# Patient Record
Sex: MALE | Race: WHITE | NOT HISPANIC OR LATINO | Employment: OTHER | URBAN - METROPOLITAN AREA
[De-identification: names, ages, dates, MRNs, and addresses within clinical notes are randomized per-mention and may not be internally consistent; named-entity substitution may affect disease eponyms.]

---

## 2017-03-20 ENCOUNTER — LAB VISIT (OUTPATIENT)
Dept: LAB | Facility: HOSPITAL | Age: 82
End: 2017-03-20
Attending: FAMILY MEDICINE
Payer: MEDICARE

## 2017-03-20 ENCOUNTER — OFFICE VISIT (OUTPATIENT)
Dept: FAMILY MEDICINE | Facility: CLINIC | Age: 82
End: 2017-03-20
Payer: MEDICARE

## 2017-03-20 VITALS
SYSTOLIC BLOOD PRESSURE: 116 MMHG | BODY MASS INDEX: 26.1 KG/M2 | WEIGHT: 192.69 LBS | DIASTOLIC BLOOD PRESSURE: 72 MMHG | HEIGHT: 72 IN | OXYGEN SATURATION: 97 % | HEART RATE: 62 BPM

## 2017-03-20 DIAGNOSIS — E78.5 DYSLIPIDEMIA: ICD-10-CM

## 2017-03-20 DIAGNOSIS — I10 ESSENTIAL HYPERTENSION: Primary | ICD-10-CM

## 2017-03-20 DIAGNOSIS — I70.0 AORTIC ATHEROSCLEROSIS: ICD-10-CM

## 2017-03-20 DIAGNOSIS — I10 ESSENTIAL HYPERTENSION: ICD-10-CM

## 2017-03-20 LAB
ALBUMIN SERPL BCP-MCNC: 3.5 G/DL
ALP SERPL-CCNC: 47 U/L
ALT SERPL W/O P-5'-P-CCNC: 12 U/L
ANION GAP SERPL CALC-SCNC: 12 MMOL/L
AST SERPL-CCNC: 18 U/L
BASOPHILS # BLD AUTO: 0.07 K/UL
BASOPHILS NFR BLD: 1.2 %
BILIRUB SERPL-MCNC: 0.8 MG/DL
BILIRUB UR QL STRIP: NEGATIVE
BUN SERPL-MCNC: 15 MG/DL
CALCIUM SERPL-MCNC: 9.1 MG/DL
CHLORIDE SERPL-SCNC: 101 MMOL/L
CHOLEST/HDLC SERPL: 4 {RATIO}
CLARITY UR REFRACT.AUTO: ABNORMAL
CO2 SERPL-SCNC: 25 MMOL/L
COLOR UR AUTO: YELLOW
CREAT SERPL-MCNC: 1.1 MG/DL
DIFFERENTIAL METHOD: ABNORMAL
EOSINOPHIL # BLD AUTO: 0.2 K/UL
EOSINOPHIL NFR BLD: 3.3 %
ERYTHROCYTE [DISTWIDTH] IN BLOOD BY AUTOMATED COUNT: 13.5 %
EST. GFR  (AFRICAN AMERICAN): >60 ML/MIN/1.73 M^2
EST. GFR  (NON AFRICAN AMERICAN): >60 ML/MIN/1.73 M^2
GLUCOSE SERPL-MCNC: 99 MG/DL
GLUCOSE UR QL STRIP: NEGATIVE
HCT VFR BLD AUTO: 40.5 %
HDL/CHOLESTEROL RATIO: 25.2 %
HDLC SERPL-MCNC: 210 MG/DL
HDLC SERPL-MCNC: 53 MG/DL
HGB BLD-MCNC: 13.9 G/DL
HGB UR QL STRIP: ABNORMAL
KETONES UR QL STRIP: NEGATIVE
LDLC SERPL CALC-MCNC: 141.2 MG/DL
LEUKOCYTE ESTERASE UR QL STRIP: ABNORMAL
LYMPHOCYTES # BLD AUTO: 1.8 K/UL
LYMPHOCYTES NFR BLD: 30.8 %
MCH RBC QN AUTO: 29 PG
MCHC RBC AUTO-ENTMCNC: 34.3 %
MCV RBC AUTO: 84 FL
MICROSCOPIC COMMENT: NORMAL
MONOCYTES # BLD AUTO: 0.6 K/UL
MONOCYTES NFR BLD: 9.4 %
NEUTROPHILS # BLD AUTO: 3.3 K/UL
NEUTROPHILS NFR BLD: 55.1 %
NITRITE UR QL STRIP: NEGATIVE
NONHDLC SERPL-MCNC: 157 MG/DL
PH UR STRIP: 5 [PH] (ref 5–8)
PLATELET # BLD AUTO: 277 K/UL
PMV BLD AUTO: 9.9 FL
POTASSIUM SERPL-SCNC: 4 MMOL/L
PROT SERPL-MCNC: 6.6 G/DL
PROT UR QL STRIP: NEGATIVE
RBC # BLD AUTO: 4.8 M/UL
RBC #/AREA URNS AUTO: 4 /HPF (ref 0–4)
SODIUM SERPL-SCNC: 138 MMOL/L
SP GR UR STRIP: 1.02 (ref 1–1.03)
TRIGL SERPL-MCNC: 79 MG/DL
TSH SERPL DL<=0.005 MIU/L-ACNC: 0.67 UIU/ML
URN SPEC COLLECT METH UR: ABNORMAL
UROBILINOGEN UR STRIP-ACNC: NEGATIVE EU/DL
WBC # BLD AUTO: 5.98 K/UL
WBC #/AREA URNS AUTO: 3 /HPF (ref 0–5)

## 2017-03-20 PROCEDURE — 3078F DIAST BP <80 MM HG: CPT | Mod: S$GLB,,, | Performed by: FAMILY MEDICINE

## 2017-03-20 PROCEDURE — 84443 ASSAY THYROID STIM HORMONE: CPT

## 2017-03-20 PROCEDURE — 85025 COMPLETE CBC W/AUTO DIFF WBC: CPT

## 2017-03-20 PROCEDURE — 1126F AMNT PAIN NOTED NONE PRSNT: CPT | Mod: S$GLB,,, | Performed by: FAMILY MEDICINE

## 2017-03-20 PROCEDURE — 1160F RVW MEDS BY RX/DR IN RCRD: CPT | Mod: S$GLB,,, | Performed by: FAMILY MEDICINE

## 2017-03-20 PROCEDURE — 99499 UNLISTED E&M SERVICE: CPT | Mod: S$GLB,,, | Performed by: FAMILY MEDICINE

## 2017-03-20 PROCEDURE — 36415 COLL VENOUS BLD VENIPUNCTURE: CPT | Mod: PO

## 2017-03-20 PROCEDURE — 99214 OFFICE O/P EST MOD 30 MIN: CPT | Mod: S$GLB,,, | Performed by: FAMILY MEDICINE

## 2017-03-20 PROCEDURE — 1157F ADVNC CARE PLAN IN RCRD: CPT | Mod: S$GLB,,, | Performed by: FAMILY MEDICINE

## 2017-03-20 PROCEDURE — 80061 LIPID PANEL: CPT

## 2017-03-20 PROCEDURE — 3074F SYST BP LT 130 MM HG: CPT | Mod: S$GLB,,, | Performed by: FAMILY MEDICINE

## 2017-03-20 PROCEDURE — 80053 COMPREHEN METABOLIC PANEL: CPT

## 2017-03-20 PROCEDURE — 99999 PR PBB SHADOW E&M-EST. PATIENT-LVL III: CPT | Mod: PBBFAC,,, | Performed by: FAMILY MEDICINE

## 2017-03-20 PROCEDURE — 1159F MED LIST DOCD IN RCRD: CPT | Mod: S$GLB,,, | Performed by: FAMILY MEDICINE

## 2017-03-20 RX ORDER — HYDROCHLOROTHIAZIDE 25 MG/1
25 TABLET ORAL DAILY
Qty: 90 TABLET | Refills: 3 | Status: SHIPPED | OUTPATIENT
Start: 2017-03-20 | End: 2018-05-05 | Stop reason: SDUPTHER

## 2017-03-20 NOTE — MR AVS SNAPSHOT
[facility]   [place]  [place] [address]  Phone: [phone]  Fax: [phone]                  Jose Latham   3/20/2017 10:00 AM   Office Visit    Description:  Male : 1934   Provider:  Ildefonso Graham MD   Department:  [facility]           Reason for Visit     Follow-up           Diagnoses this Visit        Comments    Essential hypertension    -  Primary     Dyslipidemia         Aortic atherosclerosis                To Do List           Future Appointments        Provider Department Dept Phone    3/20/2017 10:45 AM [facility] [phone]    3/21/2017 1:30 PM Jonathan Means OD [place] [facility] [phone]      Goals (5 Years of Data)     None      Follow-Up and Disposition     Return in about 6 months (around 2017), or if symptoms worsen or fail to improve.       These Medications        Disp Refills Start End    hydrochlorothiazide (HYDRODIURIL) 25 MG tablet 90 tablet 3 3/20/2017 3/20/2018    Take 1 tablet (25 mg total) by mouth once daily. - Oral    Pharmacy: [facility]/pharmacy #[id number] - [place] - [address] AT [facility] Ph #: [phone]         [facility]     [facility] Care [facility] -  Assistance  Registered nurses in the [facility] provide clinical advisement, health education, appointment booking, and other advisory services.  Call for this free service at [phone].             Medications           Message regarding Medications     Verify the changes and/or additions to your medication regime listed below are the same as discussed with your clinician today.  If any of these changes or additions are incorrect, please notify your healthcare provider.        STOP taking these medications     ondansetron (ZOFRAN-ODT) 8 MG TbDL Take 1 tablet (8 mg total) by mouth every 8 (eight) hours as needed (Nausea/vomiting).           Verify that  the below list of medications is an accurate representation of the medications you are currently taking.  If none reported, the list may be blank. If incorrect, please contact your healthcare provider. Carry this list with you in case of emergency.           Current Medications     aspirin 81 MG Chew Take 81 mg by mouth once daily.    hydrochlorothiazide (HYDRODIURIL) 25 MG tablet Take 1 tablet (25 mg total) by mouth once daily.    melatonin 3 mg Tab Take by mouth.    multivit-min-FA-lycopen-lutein (CENTRUM SILVER) 0.4-300-250 mg-mcg-mcg Tab Take by mouth.           Clinical Reference Information           Your Vitals Were     BP Pulse Height Weight SpO2 BMI    116/72 (BP Location: Right arm, Patient Position: Sitting, BP Method: Manual) 62 6' (1.829 m) 87.4 kg (192 lb 10.9 oz) 97% 26.13 kg/m2      Blood Pressure          Most Recent Value    BP  116/72      Allergies as of 3/20/2017     No Known Allergies      Immunizations Administered on Date of Encounter - 3/20/2017     None      Orders Placed During Today's Visit      Normal Orders This Visit    Urinalysis     Future Labs/Procedures Expected by Expires    CBC auto differential  3/20/2017 3/20/2018    Comprehensive metabolic panel  3/20/2017 6/18/2017    Lipid panel  3/20/2017 6/18/2017    TSH  3/20/2017 6/18/2017      Language Assistance Services     ATTENTION: Language assistance services are available, free of charge. Please call [phone].      ATENCIÓN: Si habla español, tiene a carter disposición servicios gratuitos de asistencia lingüística. Llame al [phone].     [facility] Ý: N?u b?n nói Ti?ng Vi?t, có các d?ch v? h? tr? ngôn ng? mi?n phí dành cho b?n. G?i s? [phone].         [facility] complies with applicable Federal civil rights laws and does not discriminate on the basis of race, color, national origin, age, disability, or sex.

## 2017-03-20 NOTE — PROGRESS NOTES
Subjective:       Patient ID: Jose Latham is a 82 y.o. male.    Chief Complaint: Follow-up    HPI Comments: 82 years old male who came to the clinic for blood pressure check.  Blood pressure today is stable.  No chest pain palpitations orthopnea or PND.  Patient with elevated cholesterol consider starting therapy after being recently diagnosed with aortic atherosclerosis.  No chest pain palpitations orthopnea or PND.    Review of Systems   Constitutional: Negative.    HENT: Negative.    Eyes: Negative.    Respiratory: Negative.    Cardiovascular: Negative.  Negative for chest pain, palpitations and leg swelling.   Gastrointestinal: Negative.    Genitourinary: Negative.    Musculoskeletal: Negative.    Skin: Negative.    Neurological: Negative.    Psychiatric/Behavioral: Negative.        Objective:      Physical Exam   Constitutional: He is oriented to person, place, and time. He appears well-developed and well-nourished. No distress.   HENT:   Head: Normocephalic and atraumatic.   Right Ear: External ear normal.   Left Ear: External ear normal.   Nose: Nose normal.   Mouth/Throat: Oropharynx is clear and moist. No oropharyngeal exudate.   Eyes: Conjunctivae and EOM are normal. Pupils are equal, round, and reactive to light. Right eye exhibits no discharge. Left eye exhibits no discharge. No scleral icterus.   Neck: Normal range of motion. Neck supple. No JVD present. No tracheal deviation present. No thyromegaly present.   Cardiovascular: Normal rate, regular rhythm, normal heart sounds and intact distal pulses.  Exam reveals no gallop and no friction rub.    No murmur heard.  Pulmonary/Chest: Effort normal and breath sounds normal. No stridor. No respiratory distress. He has no wheezes. He has no rales. He exhibits no tenderness.   Abdominal: Soft. Bowel sounds are normal. He exhibits no distension and no mass. There is no tenderness. There is no rebound and no guarding.   Musculoskeletal: Normal range of  motion. He exhibits no edema or tenderness.   Lymphadenopathy:     He has no cervical adenopathy.   Neurological: He is alert and oriented to person, place, and time. He has normal reflexes. He displays normal reflexes. No cranial nerve deficit. He exhibits normal muscle tone. Coordination normal.   Skin: Skin is warm and dry. No rash noted. He is not diaphoretic. No erythema. No pallor.   Psychiatric: He has a normal mood and affect. His behavior is normal. Judgment and thought content normal.   Nursing note and vitals reviewed.      Assessment:       1. Essential hypertension    2. Dyslipidemia    3. Aortic atherosclerosis        Plan:         Jose was seen today for follow-up.    Diagnoses and all orders for this visit:    Essential hypertension  -     Comprehensive metabolic panel; Future  -     Lipid panel; Future  -     Urinalysis  -     TSH; Future  -     CBC auto differential; Future  -     hydrochlorothiazide (HYDRODIURIL) 25 MG tablet; Take 1 tablet (25 mg total) by mouth once daily.    Dyslipidemia  -     Comprehensive metabolic panel; Future  -     Lipid panel; Future    Aortic atherosclerosis  -     Lipid panel; Future    Continue monitoring blood pressure at home, low sodium diet.

## 2017-03-27 ENCOUNTER — OFFICE VISIT (OUTPATIENT)
Dept: OPTOMETRY | Facility: CLINIC | Age: 82
End: 2017-03-27
Payer: MEDICARE

## 2017-03-27 DIAGNOSIS — H25.13 NUCLEAR SCLEROSIS, BILATERAL: Primary | ICD-10-CM

## 2017-03-27 DIAGNOSIS — H52.203 MYOPIA WITH ASTIGMATISM AND PRESBYOPIA, BILATERAL: ICD-10-CM

## 2017-03-27 DIAGNOSIS — H52.4 MYOPIA WITH ASTIGMATISM AND PRESBYOPIA, BILATERAL: ICD-10-CM

## 2017-03-27 DIAGNOSIS — H52.13 MYOPIA WITH ASTIGMATISM AND PRESBYOPIA, BILATERAL: ICD-10-CM

## 2017-03-27 DIAGNOSIS — H35.30 MACULAR DEGENERATION OF BOTH EYES: ICD-10-CM

## 2017-03-27 PROCEDURE — 92014 COMPRE OPH EXAM EST PT 1/>: CPT | Mod: S$GLB,,, | Performed by: OPTOMETRIST

## 2017-03-27 PROCEDURE — 99999 PR PBB SHADOW E&M-EST. PATIENT-LVL II: CPT | Mod: PBBFAC,,, | Performed by: OPTOMETRIST

## 2017-03-27 NOTE — PROGRESS NOTES
HPI     Needs a little more light to read.  Blur ou at near x mos, no assoc pain or red, constant, no relief over   time.       Last edited by Jonathan Means, OD on 3/27/2017  3:42 PM.     ROS     Negative for: Constitutional, Gastrointestinal, Neurological, Skin,   Genitourinary, Musculoskeletal, HENT, Endocrine, Cardiovascular, Eyes,   Respiratory, Psychiatric, Allergic/Imm, Heme/Lymph    Last edited by Jonathan Means, OD on 3/27/2017  3:42 PM. (History)        Assessment /Plan     For exam results, see Encounter Report.    Nuclear sclerosis, bilateral    Macular degeneration of both eyes - Both Eyes    Myopia with astigmatism and presbyopia, bilateral      1. Educated pt on presence of cataracts and effects on vision. No surgery at this time. Recheck in one year.  2. Dry. Monitor condition. Patient to report any changes. RTC 1 year recheck.  3. Spec Rx given. Different lens options discussed with patient. RTC 1 year full exam.

## 2017-12-12 ENCOUNTER — CLINICAL SUPPORT (OUTPATIENT)
Dept: FAMILY MEDICINE | Facility: CLINIC | Age: 82
End: 2017-12-12
Payer: MEDICARE

## 2017-12-12 DIAGNOSIS — Z23 NEED FOR PROPHYLACTIC VACCINATION AND INOCULATION AGAINST INFLUENZA: Primary | ICD-10-CM

## 2017-12-12 PROCEDURE — G0008 ADMIN INFLUENZA VIRUS VAC: HCPCS | Mod: S$GLB,,, | Performed by: FAMILY MEDICINE

## 2017-12-12 PROCEDURE — 90662 IIV NO PRSV INCREASED AG IM: CPT | Mod: S$GLB,,, | Performed by: FAMILY MEDICINE

## 2018-02-26 ENCOUNTER — PES CALL (OUTPATIENT)
Dept: ADMINISTRATIVE | Facility: CLINIC | Age: 83
End: 2018-02-26

## 2018-03-28 ENCOUNTER — OFFICE VISIT (OUTPATIENT)
Dept: OPTOMETRY | Facility: CLINIC | Age: 83
End: 2018-03-28
Payer: COMMERCIAL

## 2018-03-28 ENCOUNTER — TELEPHONE (OUTPATIENT)
Dept: OPHTHALMOLOGY | Facility: CLINIC | Age: 83
End: 2018-03-28

## 2018-03-28 DIAGNOSIS — H25.13 NUCLEAR SCLEROSIS, BILATERAL: Primary | ICD-10-CM

## 2018-03-28 DIAGNOSIS — H35.3131 EARLY DRY STAGE NONEXUDATIVE AGE-RELATED MACULAR DEGENERATION OF BOTH EYES: ICD-10-CM

## 2018-03-28 PROCEDURE — 99999 PR PBB SHADOW E&M-EST. PATIENT-LVL III: CPT | Mod: PBBFAC,,, | Performed by: OPTOMETRIST

## 2018-03-28 PROCEDURE — 92014 COMPRE OPH EXAM EST PT 1/>: CPT | Mod: S$GLB,,, | Performed by: OPTOMETRIST

## 2018-03-28 NOTE — PROGRESS NOTES
MUKESH DUNCAN 03/2017 Harder to see distance.  Would like stronger reading only   glasses and another pair with progressive lens.  Would like the reading   pair to be a stronger magnification. Is due for 's test in August   and wants to make sure his vision is good enough to pass.  Blur ou at dist, x mos, no assoc pain or red, no relief over time,   constant    Last edited by Jonathan Means, OD on 3/28/2018  1:42 PM. (History)            Assessment /Plan     For exam results, see Encounter Report.    Nuclear sclerosis, bilateral  -     Ambulatory Referral to Ophthalmology    Early dry stage nonexudative age-related macular degeneration of both eyes      1. Refer to Dr. Dobson for cataract evaluation and possible removal. May need macula oct prior.  2. Dry and flat, no heme.

## 2018-03-28 NOTE — TELEPHONE ENCOUNTER
Left message for pt to call back to schedule his cataract consult with Dr. Dobson at the [facility].

## 2018-04-24 ENCOUNTER — INITIAL CONSULT (OUTPATIENT)
Dept: OPHTHALMOLOGY | Facility: CLINIC | Age: 83
End: 2018-04-24
Payer: MEDICARE

## 2018-04-24 DIAGNOSIS — H25.13 CATARACTA BRUNESCENS OF BOTH EYES: ICD-10-CM

## 2018-04-24 DIAGNOSIS — H35.3131 EARLY DRY STAGE NONEXUDATIVE AGE-RELATED MACULAR DEGENERATION OF BOTH EYES: ICD-10-CM

## 2018-04-24 DIAGNOSIS — H25.13 NUCLEAR SCLEROTIC CATARACT OF BOTH EYES: Primary | ICD-10-CM

## 2018-04-24 PROCEDURE — 92134 CPTRZ OPH DX IMG PST SGM RTA: CPT | Mod: S$GLB,,, | Performed by: OPHTHALMOLOGY

## 2018-04-24 PROCEDURE — 92014 COMPRE OPH EXAM EST PT 1/>: CPT | Mod: S$GLB,,, | Performed by: OPHTHALMOLOGY

## 2018-04-24 PROCEDURE — 99999 PR PBB SHADOW E&M-EST. PATIENT-LVL II: CPT | Mod: PBBFAC,,, | Performed by: OPHTHALMOLOGY

## 2018-04-24 NOTE — PROGRESS NOTES
HPI     Cataract    Additional comments: cataract eval            Comments   Pt here for cataract evaluation per Dr. Means. Pt states that his   vision OD is foggy and having difficulty reading.    1. NS OU  2. ARMD       Last edited by Zandra Sandoval MA on 4/24/2018  1:12 PM. (History)            Assessment /Plan     For exam results, see Encounter Report.    Nuclear sclerotic cataract of both eyes    Cataracta brunescens of both eyes    Early dry stage nonexudative age-related macular degeneration of both eyes    For exam results, see Encounter Report.    Nuclear sclerosis, bilateral  -     Ambulatory Referral to Ophthalmology    Early dry stage nonexudative age-related macular degeneration of both eyes      NS ou   Vis sign ou    od > OS    Denies flomax     2 early dry armd ou    OCT       PLAN    Phaco/IOL od first - if does ok can do os latter    Trypan blue // brown lens

## 2018-04-24 NOTE — LETTER
April 24, 2018      Jonathan Means, OD  [address] [place] [address]           [place] [facility]  [address] [facility]  [place] [address]  Phone: [phone]  Fax: [phone]          Patient: Jose Latham   MR Number: [id number]   YOB: 1934   Date of Visit: 4/24/2018       Dear Dr. Jonathan Means:    Thank you for referring Jose Latham to me for evaluation. Attached you will find relevant portions of my assessment and plan of care.    If you have questions, please do not hesitate to call me. I look forward to following Jose Latham along with you.    Sincerely,    Mari Dobson MD    Enclosure  CC:  No Recipients    If you would like to receive this communication electronically, please contact [email or web address] or [phone] to request more information on [employer] access.    For providers and/or their staff who would like to refer a patient to [facility], please contact us through our one-stop-shop provider referral line, [facility] , at [phone].    If you feel you have received this communication in error or would no longer like to receive these types of communications, please e-mail [email or web address]

## 2018-04-25 ENCOUNTER — TELEPHONE (OUTPATIENT)
Dept: OPHTHALMOLOGY | Facility: CLINIC | Age: 83
End: 2018-04-25

## 2018-04-25 DIAGNOSIS — H25.11 NUCLEAR SCLEROTIC CATARACT OF RIGHT EYE: Primary | ICD-10-CM

## 2018-05-04 ENCOUNTER — OFFICE VISIT (OUTPATIENT)
Dept: OPHTHALMOLOGY | Facility: CLINIC | Age: 83
End: 2018-05-04
Payer: MEDICARE

## 2018-05-04 DIAGNOSIS — H35.3131 EARLY DRY STAGE NONEXUDATIVE AGE-RELATED MACULAR DEGENERATION OF BOTH EYES: ICD-10-CM

## 2018-05-04 DIAGNOSIS — H25.13 NUCLEAR SCLEROTIC CATARACT OF BOTH EYES: ICD-10-CM

## 2018-05-04 DIAGNOSIS — H25.11 NUCLEAR SCLEROTIC CATARACT OF RIGHT EYE: Primary | ICD-10-CM

## 2018-05-04 DIAGNOSIS — H25.13 CATARACTA BRUNESCENS OF BOTH EYES: ICD-10-CM

## 2018-05-04 PROCEDURE — 99999 PR PBB SHADOW E&M-EST. PATIENT-LVL II: CPT | Mod: PBBFAC,,, | Performed by: OPHTHALMOLOGY

## 2018-05-04 PROCEDURE — 92136 OPHTHALMIC BIOMETRY: CPT | Mod: 50,S$GLB,, | Performed by: OPHTHALMOLOGY

## 2018-05-04 PROCEDURE — 99499 UNLISTED E&M SERVICE: CPT | Mod: S$GLB,,, | Performed by: OPHTHALMOLOGY

## 2018-05-05 DIAGNOSIS — I10 ESSENTIAL HYPERTENSION: ICD-10-CM

## 2018-05-07 RX ORDER — HYDROCHLOROTHIAZIDE 25 MG/1
25 TABLET ORAL DAILY
Qty: 90 TABLET | Refills: 2 | Status: SHIPPED | OUTPATIENT
Start: 2018-05-07 | End: 2019-01-24 | Stop reason: SDUPTHER

## 2018-05-09 ENCOUNTER — OFFICE VISIT (OUTPATIENT)
Dept: FAMILY MEDICINE | Facility: CLINIC | Age: 83
End: 2018-05-09
Payer: MEDICARE

## 2018-05-09 ENCOUNTER — LAB VISIT (OUTPATIENT)
Dept: LAB | Facility: HOSPITAL | Age: 83
End: 2018-05-09
Attending: FAMILY MEDICINE
Payer: MEDICARE

## 2018-05-09 VITALS
HEART RATE: 59 BPM | SYSTOLIC BLOOD PRESSURE: 130 MMHG | BODY MASS INDEX: 25.98 KG/M2 | WEIGHT: 191.81 LBS | DIASTOLIC BLOOD PRESSURE: 84 MMHG | HEIGHT: 72 IN

## 2018-05-09 DIAGNOSIS — Z01.818 PRE-OP EXAM: ICD-10-CM

## 2018-05-09 DIAGNOSIS — I10 ESSENTIAL HYPERTENSION: ICD-10-CM

## 2018-05-09 DIAGNOSIS — Z01.818 PRE-OP EXAM: Primary | ICD-10-CM

## 2018-05-09 LAB
ALBUMIN SERPL BCP-MCNC: 3.8 G/DL
ALP SERPL-CCNC: 53 U/L
ALT SERPL W/O P-5'-P-CCNC: 14 U/L
ANION GAP SERPL CALC-SCNC: 9 MMOL/L
AST SERPL-CCNC: 20 U/L
BASOPHILS # BLD AUTO: 0.08 K/UL
BASOPHILS NFR BLD: 1.4 %
BILIRUB SERPL-MCNC: 0.9 MG/DL
BILIRUB UR QL STRIP: NEGATIVE
BUN SERPL-MCNC: 18 MG/DL
CALCIUM SERPL-MCNC: 9.5 MG/DL
CHLORIDE SERPL-SCNC: 100 MMOL/L
CLARITY UR REFRACT.AUTO: CLEAR
CO2 SERPL-SCNC: 29 MMOL/L
COLOR UR AUTO: YELLOW
CREAT SERPL-MCNC: 1.1 MG/DL
DIFFERENTIAL METHOD: ABNORMAL
EOSINOPHIL # BLD AUTO: 0.3 K/UL
EOSINOPHIL NFR BLD: 5 %
ERYTHROCYTE [DISTWIDTH] IN BLOOD BY AUTOMATED COUNT: 13.6 %
EST. GFR  (AFRICAN AMERICAN): >60 ML/MIN/1.73 M^2
EST. GFR  (NON AFRICAN AMERICAN): >60 ML/MIN/1.73 M^2
GLUCOSE SERPL-MCNC: 95 MG/DL
GLUCOSE UR QL STRIP: NEGATIVE
HCT VFR BLD AUTO: 41.4 %
HGB BLD-MCNC: 13.5 G/DL
HGB UR QL STRIP: NEGATIVE
IMM GRANULOCYTES # BLD AUTO: 0.01 K/UL
IMM GRANULOCYTES NFR BLD AUTO: 0.2 %
KETONES UR QL STRIP: NEGATIVE
LEUKOCYTE ESTERASE UR QL STRIP: NEGATIVE
LYMPHOCYTES # BLD AUTO: 2 K/UL
LYMPHOCYTES NFR BLD: 33.9 %
MCH RBC QN AUTO: 28.8 PG
MCHC RBC AUTO-ENTMCNC: 32.6 G/DL
MCV RBC AUTO: 88 FL
MONOCYTES # BLD AUTO: 0.5 K/UL
MONOCYTES NFR BLD: 8.3 %
NEUTROPHILS # BLD AUTO: 2.9 K/UL
NEUTROPHILS NFR BLD: 51.2 %
NITRITE UR QL STRIP: NEGATIVE
NRBC BLD-RTO: 0 /100 WBC
PH UR STRIP: 6 [PH] (ref 5–8)
PLATELET # BLD AUTO: 306 K/UL
PMV BLD AUTO: 10 FL
POTASSIUM SERPL-SCNC: 3.6 MMOL/L
PROT SERPL-MCNC: 6.8 G/DL
PROT UR QL STRIP: NEGATIVE
RBC # BLD AUTO: 4.69 M/UL
SODIUM SERPL-SCNC: 138 MMOL/L
SP GR UR STRIP: 1.02 (ref 1–1.03)
URN SPEC COLLECT METH UR: NORMAL
UROBILINOGEN UR STRIP-ACNC: NEGATIVE EU/DL
WBC # BLD AUTO: 5.75 K/UL

## 2018-05-09 PROCEDURE — 36415 COLL VENOUS BLD VENIPUNCTURE: CPT | Mod: PO

## 2018-05-09 PROCEDURE — 3075F SYST BP GE 130 - 139MM HG: CPT | Mod: CPTII,S$GLB,, | Performed by: FAMILY MEDICINE

## 2018-05-09 PROCEDURE — 85025 COMPLETE CBC W/AUTO DIFF WBC: CPT

## 2018-05-09 PROCEDURE — 99214 OFFICE O/P EST MOD 30 MIN: CPT | Mod: S$GLB,,, | Performed by: FAMILY MEDICINE

## 2018-05-09 PROCEDURE — 3079F DIAST BP 80-89 MM HG: CPT | Mod: CPTII,S$GLB,, | Performed by: FAMILY MEDICINE

## 2018-05-09 PROCEDURE — 80053 COMPREHEN METABOLIC PANEL: CPT

## 2018-05-09 PROCEDURE — 93010 ELECTROCARDIOGRAM REPORT: CPT | Mod: S$GLB,,, | Performed by: INTERNAL MEDICINE

## 2018-05-09 PROCEDURE — 93005 ELECTROCARDIOGRAM TRACING: CPT | Mod: S$GLB,,, | Performed by: FAMILY MEDICINE

## 2018-05-09 PROCEDURE — 99999 PR PBB SHADOW E&M-EST. PATIENT-LVL III: CPT | Mod: PBBFAC,,, | Performed by: FAMILY MEDICINE

## 2018-05-09 PROCEDURE — 81003 URINALYSIS AUTO W/O SCOPE: CPT

## 2018-05-09 NOTE — PROGRESS NOTES
Subjective:       Patient ID: Jose Latham is a 83 y.o. male.    Chief Complaint: Pre-op Exam    83 years old male came to the clinic for blood pressure check.  No pressure today stable.  No chest pain palpitations orthopnea or PND.  Patient came also for preoperative evaluation for cataract surgery.  Patient with good compliance with medical regimen.      Review of Systems   Constitutional: Negative.    HENT: Negative.    Eyes: Negative.    Respiratory: Negative.    Cardiovascular: Negative.  Negative for chest pain, palpitations and leg swelling.   Gastrointestinal: Negative.    Genitourinary: Negative.    Musculoskeletal: Negative.    Skin: Negative.    Neurological: Negative.    Psychiatric/Behavioral: Negative.        Objective:      Physical Exam   Constitutional: He is oriented to person, place, and time. He appears well-developed and well-nourished. No distress.   HENT:   Head: Normocephalic and atraumatic.   Right Ear: External ear normal.   Left Ear: External ear normal.   Nose: Nose normal.   Mouth/Throat: Oropharynx is clear and moist. No oropharyngeal exudate.   Eyes: Conjunctivae and EOM are normal. Pupils are equal, round, and reactive to light. Right eye exhibits no discharge. Left eye exhibits no discharge. No scleral icterus.   Neck: Normal range of motion. Neck supple. No JVD present. No tracheal deviation present. No thyromegaly present.   Cardiovascular: Normal rate, regular rhythm, normal heart sounds and intact distal pulses.  Exam reveals no gallop and no friction rub.    No murmur heard.  Pulmonary/Chest: Effort normal and breath sounds normal. No stridor. No respiratory distress. He has no wheezes. He has no rales. He exhibits no tenderness.   Abdominal: Soft. Bowel sounds are normal. He exhibits no distension and no mass. There is no tenderness. There is no rebound and no guarding.   Musculoskeletal: Normal range of motion. He exhibits no edema or tenderness.   Lymphadenopathy:     He  has no cervical adenopathy.   Neurological: He is alert and oriented to person, place, and time. He has normal reflexes. He displays normal reflexes. No cranial nerve deficit. He exhibits normal muscle tone. Coordination normal.   Skin: Skin is warm and dry. No rash noted. He is not diaphoretic. No erythema. No pallor.   Psychiatric: He has a normal mood and affect. His behavior is normal. Judgment and thought content normal.   Nursing note and vitals reviewed.      Assessment:       1. Pre-op exam    2. Essential hypertension        Plan:         Jose was seen today for pre-op exam.    Diagnoses and all orders for this visit:    Pre-op exam  -     Cancel: Comprehensive metabolic panel; Future  -     Cancel: Urinalysis; Future  -     Cancel: CBC auto differential; Future  -     EKG 12-lead  -     Urinalysis  -     CBC auto differential; Future  -     Comprehensive metabolic panel; Future    Essential hypertension  -     EKG 12-lead  -     Urinalysis  -     CBC auto differential; Future  -     Comprehensive metabolic panel; Future    Continue monitoring blood pressure at home, low sodium diet.

## 2018-05-09 NOTE — PATIENT INSTRUCTIONS

## 2018-05-10 RX ORDER — KETOROLAC TROMETHAMINE 5 MG/ML
1 SOLUTION OPHTHALMIC
Status: CANCELLED | OUTPATIENT
Start: 2018-05-10

## 2018-05-10 RX ORDER — LIDOCAINE HYDROCHLORIDE 10 MG/ML
1 INJECTION, SOLUTION EPIDURAL; INFILTRATION; INTRACAUDAL; PERINEURAL ONCE
Status: CANCELLED | OUTPATIENT
Start: 2018-05-10 | End: 2018-05-10

## 2018-05-10 RX ORDER — PHENYLEPHRINE HYDROCHLORIDE 100 MG/ML
1 SOLUTION/ DROPS OPHTHALMIC
Status: CANCELLED | OUTPATIENT
Start: 2018-05-10

## 2018-05-10 RX ORDER — MOXIFLOXACIN 5 MG/ML
1 SOLUTION/ DROPS OPHTHALMIC
Status: CANCELLED | OUTPATIENT
Start: 2018-05-10

## 2018-05-10 RX ORDER — SODIUM CHLORIDE 9 MG/ML
INJECTION, SOLUTION INTRAVENOUS CONTINUOUS
Status: CANCELLED | OUTPATIENT
Start: 2018-05-10

## 2018-05-10 RX ORDER — TROPICAMIDE 10 MG/ML
1 SOLUTION/ DROPS OPHTHALMIC
Status: CANCELLED | OUTPATIENT
Start: 2018-05-10

## 2018-05-10 NOTE — PROGRESS NOTES
HPI     DLS: 4/24/18    Pt here for Pre-Op complex phaco w/IOL OD- (Surgery is 5/16/18)    Meds:  No GTTS    1. NS OU   2. ARMD         Last edited by Odessa Jiménez on 5/4/2018  1:57 PM. (History)            Assessment /Plan     For exam results, see Encounter Report.    Nuclear sclerotic cataract of right eye    Nuclear sclerotic cataract of both eyes    Cataracta brunescens of both eyes    Early dry stage nonexudative age-related macular degeneration of both eyes        HIS WIFE IS A LONG TIME PATIENT OF MINE    From [place]     NS ou   Vis sign ou    od > OS    Denies flomax     2 early dry armd ou    OCT       PLAN    Phaco/IOL od first - if does ok can do os latter    Trypan blue // brown lens    IOL calc OD - aim for pano to -0.50   PCB00 19.0  AC IOL 15.5    Risks and benefits discussed and consent signed.

## 2018-05-11 ENCOUNTER — TELEPHONE (OUTPATIENT)
Dept: FAMILY MEDICINE | Facility: CLINIC | Age: 83
End: 2018-05-11

## 2018-05-15 ENCOUNTER — TELEPHONE (OUTPATIENT)
Dept: OPTOMETRY | Facility: CLINIC | Age: 83
End: 2018-05-15

## 2018-05-15 NOTE — TELEPHONE ENCOUNTER
Spoke with the patient and gave surgery arrival time 8:00am, also no food or drink after midnight.

## 2018-05-16 ENCOUNTER — HOSPITAL ENCOUNTER (OUTPATIENT)
Facility: HOSPITAL | Age: 83
Discharge: HOME | End: 2018-05-16
Attending: OPHTHALMOLOGY | Admitting: OPHTHALMOLOGY
Payer: MEDICARE

## 2018-05-16 ENCOUNTER — ANESTHESIA EVENT (OUTPATIENT)
Dept: SURGERY | Facility: HOSPITAL | Age: 83
End: 2018-05-16
Payer: MEDICARE

## 2018-05-16 ENCOUNTER — SURGERY (OUTPATIENT)
Age: 83
End: 2018-05-16

## 2018-05-16 ENCOUNTER — ANESTHESIA (OUTPATIENT)
Dept: SURGERY | Facility: HOSPITAL | Age: 83
End: 2018-05-16
Payer: MEDICARE

## 2018-05-16 VITALS
TEMPERATURE: 99 F | OXYGEN SATURATION: 100 % | HEART RATE: 69 BPM | HEIGHT: 72 IN | BODY MASS INDEX: 25.73 KG/M2 | WEIGHT: 190 LBS | RESPIRATION RATE: 18 BRPM | SYSTOLIC BLOOD PRESSURE: 145 MMHG | DIASTOLIC BLOOD PRESSURE: 78 MMHG

## 2018-05-16 DIAGNOSIS — H25.11 NUCLEAR SCLEROTIC CATARACT OF RIGHT EYE: Primary | ICD-10-CM

## 2018-05-16 PROCEDURE — 25000003 PHARM REV CODE 250: Performed by: OPHTHALMOLOGY

## 2018-05-16 PROCEDURE — V2632 POST CHMBR INTRAOCULAR LENS: HCPCS | Performed by: OPHTHALMOLOGY

## 2018-05-16 PROCEDURE — D9220A PRA ANESTHESIA: Mod: ANES,,, | Performed by: ANESTHESIOLOGY

## 2018-05-16 PROCEDURE — 63600175 PHARM REV CODE 636 W HCPCS: Performed by: NURSE ANESTHETIST, CERTIFIED REGISTERED

## 2018-05-16 PROCEDURE — 36000706: Performed by: OPHTHALMOLOGY

## 2018-05-16 PROCEDURE — 71000015 HC POSTOP RECOV 1ST HR: Performed by: OPHTHALMOLOGY

## 2018-05-16 PROCEDURE — D9220A PRA ANESTHESIA: Mod: CRNA,,, | Performed by: NURSE ANESTHETIST, CERTIFIED REGISTERED

## 2018-05-16 PROCEDURE — 27201423 OPTIME MED/SURG SUP & DEVICES STERILE SUPPLY: Performed by: OPHTHALMOLOGY

## 2018-05-16 PROCEDURE — 37000009 HC ANESTHESIA EA ADD 15 MINS: Performed by: OPHTHALMOLOGY

## 2018-05-16 PROCEDURE — 25000003 PHARM REV CODE 250

## 2018-05-16 PROCEDURE — 36000707: Performed by: OPHTHALMOLOGY

## 2018-05-16 PROCEDURE — 37000008 HC ANESTHESIA 1ST 15 MINUTES: Performed by: OPHTHALMOLOGY

## 2018-05-16 PROCEDURE — 66982 XCAPSL CTRC RMVL CPLX WO ECP: CPT | Mod: 79,RT,, | Performed by: OPHTHALMOLOGY

## 2018-05-16 PROCEDURE — 63600175 PHARM REV CODE 636 W HCPCS: Performed by: OPHTHALMOLOGY

## 2018-05-16 DEVICE — LENS IOL ITEC PRELOAD 19.0D: Type: IMPLANTABLE DEVICE | Site: EYE | Status: FUNCTIONAL

## 2018-05-16 RX ORDER — METOCLOPRAMIDE HYDROCHLORIDE 5 MG/ML
10 INJECTION INTRAMUSCULAR; INTRAVENOUS EVERY 10 MIN PRN
Status: DISCONTINUED | OUTPATIENT
Start: 2018-05-16 | End: 2018-05-16 | Stop reason: HOSPADM

## 2018-05-16 RX ORDER — MOXIFLOXACIN 5 MG/ML
SOLUTION/ DROPS OPHTHALMIC
Status: DISCONTINUED | OUTPATIENT
Start: 2018-05-16 | End: 2018-05-16 | Stop reason: HOSPADM

## 2018-05-16 RX ORDER — ACETAMINOPHEN 325 MG/1
650 TABLET ORAL EVERY 6 HOURS PRN
Status: DISCONTINUED | OUTPATIENT
Start: 2018-05-16 | End: 2018-05-16 | Stop reason: HOSPADM

## 2018-05-16 RX ORDER — KETOROLAC TROMETHAMINE 5 MG/ML
SOLUTION OPHTHALMIC
Status: COMPLETED
Start: 2018-05-16 | End: 2018-05-16

## 2018-05-16 RX ORDER — EPINEPHRINE 1 MG/ML
INJECTION, SOLUTION INTRACARDIAC; INTRAMUSCULAR; INTRAVENOUS; SUBCUTANEOUS
Status: DISCONTINUED
Start: 2018-05-16 | End: 2018-05-16 | Stop reason: HOSPADM

## 2018-05-16 RX ORDER — ACETAZOLAMIDE 500 MG/1
500 CAPSULE, EXTENDED RELEASE ORAL ONCE
Status: COMPLETED | OUTPATIENT
Start: 2018-05-16 | End: 2018-05-16

## 2018-05-16 RX ORDER — MIDAZOLAM HYDROCHLORIDE 1 MG/ML
INJECTION INTRAMUSCULAR; INTRAVENOUS
Status: DISCONTINUED | OUTPATIENT
Start: 2018-05-16 | End: 2018-05-16

## 2018-05-16 RX ORDER — LIDOCAINE HYDROCHLORIDE 10 MG/ML
INJECTION, SOLUTION EPIDURAL; INFILTRATION; INTRACAUDAL; PERINEURAL
Status: DISCONTINUED | OUTPATIENT
Start: 2018-05-16 | End: 2018-05-16 | Stop reason: HOSPADM

## 2018-05-16 RX ORDER — PREDNISOLONE ACETATE 10 MG/ML
SUSPENSION/ DROPS OPHTHALMIC
Status: DISCONTINUED | OUTPATIENT
Start: 2018-05-16 | End: 2018-05-16 | Stop reason: HOSPADM

## 2018-05-16 RX ORDER — LIDOCAINE HYDROCHLORIDE 40 MG/ML
INJECTION, SOLUTION RETROBULBAR
Status: DISCONTINUED
Start: 2018-05-16 | End: 2018-05-16 | Stop reason: HOSPADM

## 2018-05-16 RX ORDER — TROPICAMIDE 10 MG/ML
1 SOLUTION/ DROPS OPHTHALMIC
Status: DISCONTINUED | OUTPATIENT
Start: 2018-05-16 | End: 2018-05-16 | Stop reason: HOSPADM

## 2018-05-16 RX ORDER — KETOROLAC TROMETHAMINE 5 MG/ML
1 SOLUTION OPHTHALMIC
Status: DISCONTINUED | OUTPATIENT
Start: 2018-05-16 | End: 2018-05-16 | Stop reason: HOSPADM

## 2018-05-16 RX ORDER — TROPICAMIDE 10 MG/ML
SOLUTION/ DROPS OPHTHALMIC
Status: COMPLETED
Start: 2018-05-16 | End: 2018-05-16

## 2018-05-16 RX ORDER — PHENYLEPHRINE HYDROCHLORIDE 100 MG/ML
SOLUTION/ DROPS OPHTHALMIC
Status: COMPLETED
Start: 2018-05-16 | End: 2018-05-16

## 2018-05-16 RX ORDER — MOXIFLOXACIN 5 MG/ML
SOLUTION/ DROPS OPHTHALMIC
Status: COMPLETED
Start: 2018-05-16 | End: 2018-05-16

## 2018-05-16 RX ORDER — LIDOCAINE HYDROCHLORIDE 20 MG/ML
JELLY TOPICAL
Status: DISCONTINUED | OUTPATIENT
Start: 2018-05-16 | End: 2018-05-16 | Stop reason: HOSPADM

## 2018-05-16 RX ORDER — LIDOCAINE HYDROCHLORIDE 10 MG/ML
INJECTION, SOLUTION EPIDURAL; INFILTRATION; INTRACAUDAL; PERINEURAL
Status: DISCONTINUED
Start: 2018-05-16 | End: 2018-05-16 | Stop reason: HOSPADM

## 2018-05-16 RX ORDER — FENTANYL CITRATE 50 UG/ML
INJECTION, SOLUTION INTRAMUSCULAR; INTRAVENOUS
Status: DISCONTINUED | OUTPATIENT
Start: 2018-05-16 | End: 2018-05-16

## 2018-05-16 RX ORDER — SODIUM CHLORIDE 9 MG/ML
INJECTION, SOLUTION INTRAVENOUS CONTINUOUS
Status: DISCONTINUED | OUTPATIENT
Start: 2018-05-16 | End: 2018-05-16 | Stop reason: HOSPADM

## 2018-05-16 RX ORDER — EPINEPHRINE 1 MG/ML
INJECTION, SOLUTION INTRACARDIAC; INTRAMUSCULAR; INTRAVENOUS; SUBCUTANEOUS
Status: DISCONTINUED | OUTPATIENT
Start: 2018-05-16 | End: 2018-05-16 | Stop reason: HOSPADM

## 2018-05-16 RX ORDER — LIDOCAINE HYDROCHLORIDE 10 MG/ML
1 INJECTION, SOLUTION EPIDURAL; INFILTRATION; INTRACAUDAL; PERINEURAL ONCE
Status: COMPLETED | OUTPATIENT
Start: 2018-05-16 | End: 2018-05-16

## 2018-05-16 RX ORDER — LIDOCAINE HYDROCHLORIDE 40 MG/ML
INJECTION, SOLUTION RETROBULBAR
Status: DISCONTINUED | OUTPATIENT
Start: 2018-05-16 | End: 2018-05-16 | Stop reason: HOSPADM

## 2018-05-16 RX ORDER — LIDOCAINE HYDROCHLORIDE 20 MG/ML
JELLY TOPICAL
Status: DISCONTINUED
Start: 2018-05-16 | End: 2018-05-16 | Stop reason: HOSPADM

## 2018-05-16 RX ORDER — PHENYLEPHRINE HYDROCHLORIDE 100 MG/ML
1 SOLUTION/ DROPS OPHTHALMIC
Status: DISCONTINUED | OUTPATIENT
Start: 2018-05-16 | End: 2018-05-16 | Stop reason: HOSPADM

## 2018-05-16 RX ORDER — PREDNISOLONE ACETATE 10 MG/ML
SUSPENSION/ DROPS OPHTHALMIC
Status: DISCONTINUED
Start: 2018-05-16 | End: 2018-05-16 | Stop reason: HOSPADM

## 2018-05-16 RX ORDER — MOXIFLOXACIN 5 MG/ML
1 SOLUTION/ DROPS OPHTHALMIC
Status: DISCONTINUED | OUTPATIENT
Start: 2018-05-16 | End: 2018-05-16 | Stop reason: HOSPADM

## 2018-05-16 RX ADMIN — SODIUM CHLORIDE: 0.9 INJECTION, SOLUTION INTRAVENOUS at 08:05

## 2018-05-16 RX ADMIN — MOXIFLOXACIN 1 DROP: 5 SOLUTION/ DROPS OPHTHALMIC at 08:05

## 2018-05-16 RX ADMIN — LIDOCAINE HYDROCHLORIDE 1 ML: 10 INJECTION, SOLUTION EPIDURAL; INFILTRATION; INTRACAUDAL; PERINEURAL at 09:05

## 2018-05-16 RX ADMIN — PHENYLEPHRINE HYDROCHLORIDE 1 DROP: 100 SOLUTION/ DROPS OPHTHALMIC at 08:05

## 2018-05-16 RX ADMIN — Medication 10 MG: at 09:05

## 2018-05-16 RX ADMIN — FENTANYL CITRATE 25 MCG: 50 INJECTION, SOLUTION INTRAMUSCULAR; INTRAVENOUS at 09:05

## 2018-05-16 RX ADMIN — TROPICAMIDE 1 DROP: 10 SOLUTION/ DROPS OPHTHALMIC at 08:05

## 2018-05-16 RX ADMIN — LIDOCAINE HYDROCHLORIDE 5 ML: 40 INJECTION, SOLUTION RETROBULBAR; TOPICAL at 09:05

## 2018-05-16 RX ADMIN — KETOROLAC TROMETHAMINE 1 DROP: 5 SOLUTION OPHTHALMIC at 08:05

## 2018-05-16 RX ADMIN — SODIUM CHONDROITIN SULFATE / SODIUM HYALURONATE 1.05 ML: 0.55-0.5 INJECTION INTRAOCULAR at 09:05

## 2018-05-16 RX ADMIN — ACETAZOLAMIDE 500 MG: 500 CAPSULE, EXTENDED RELEASE ORAL at 10:05

## 2018-05-16 RX ADMIN — EPINEPHRINE 0.3 MG: 1 INJECTION, SOLUTION INTRAMUSCULAR; SUBCUTANEOUS at 09:05

## 2018-05-16 RX ADMIN — PREDNISOLONE ACETATE 2 DROP: 10 SUSPENSION/ DROPS OPHTHALMIC at 09:05

## 2018-05-16 RX ADMIN — LIDOCAINE HYDROCHLORIDE 2 ML: 20 JELLY TOPICAL at 09:05

## 2018-05-16 RX ADMIN — MIDAZOLAM HYDROCHLORIDE 0.5 MG: 1 INJECTION, SOLUTION INTRAMUSCULAR; INTRAVENOUS at 09:05

## 2018-05-16 RX ADMIN — MOXIFLOXACIN HYDROCHLORIDE 1 DROP: 5 SOLUTION/ DROPS OPHTHALMIC at 08:05

## 2018-05-16 RX ADMIN — TRYPAN BLUE 0.5 ML: 0.3 INJECTION, SOLUTION INTRAOCULAR; OPHTHALMIC at 09:05

## 2018-05-16 RX ADMIN — LIDOCAINE HYDROCHLORIDE 1 MG: 10 INJECTION, SOLUTION EPIDURAL; INFILTRATION; INTRACAUDAL; PERINEURAL at 08:05

## 2018-05-16 RX ADMIN — MIDAZOLAM HYDROCHLORIDE 1 MG: 1 INJECTION, SOLUTION INTRAMUSCULAR; INTRAVENOUS at 09:05

## 2018-05-16 RX ADMIN — MOXIFLOXACIN HYDROCHLORIDE 1 DROP: 5 SOLUTION/ DROPS OPHTHALMIC at 09:05

## 2018-05-16 NOTE — DISCHARGE SUMMARY
Date of Procedure / Discharge: 05/16/2018     PreOp Diagnosis: Complex Cataract OD      PostOp Diagnosis:as above s/p procedure    Procedure:Complex Cataract Extraction with Phacoemulsification and trypan blue  w/ Posterior Chamber IOL Implantation    Attending:Mari Dobson MD    Assistant:none     Anesthesia:Local MAC    Complications:: None    Blood loss: <5 CC    Specimens: none    Procedure Details:  See Dictation      -D/C home when patient meets anesthesia requirements  -Follow up tomorrow with surgeon in the [facility].

## 2018-05-16 NOTE — OP NOTE
DATE OF PROCEDURE: 5/16/2018    PREOPERATIVE DIAGNOSIS: Complex Cataract - dense brunescent lens - poor red reflex // Nuclear sclerotic cataract of right eye OD.     POSTOPERATIVE DIAGNOSIS: Complex cataract- dense brown lens - poor red reflex / Nuclear sclerotic cataract of right eyeOD, status post procedure.     PROCEDURE PERFORMED: Cataract extraction with trypan blue and  phacoemulsification, posterior   chamber intraocular lens placement.     SURGEON: Mari Dobson M.D.     ASSISTANT: none     COMPLICATIONS: None.     BLOOD LOSS: Less than 5 mL.     ANESTHESIA: Local MAC    IMPLANT DATA:   1. Abbott PCB00 , power 19.0 diopters, serial #[id number]    PROCEDURE IN DETAIL: After informed consent including risks, benefits,   alternatives, the patient was brought to the operating room table, placed in   supine position. Monitored anesthesia care was used throughout the entire   procedure. Once adequate anesthesia was confirmed, the patient was then prepped   and draped in usual sterile fashion for intraocular surgery. Wire speculum was   used to hold the eyelids apart and the procedure was initiated by making   a partial thickness corneal wound with a cadence blade temporally.   A supersharp blade was then used to make a second entry into the eye via a paracentesis incision.  Intracameral lidocaine followed by trypan blue, followed by Viscoat were placed in the anterior chamber.   A 2.4 mm blade was then used to make to complete the clear corneal   incision temporally. A cystotome was used to make a tear in   the anterior capsular flap, which was continued around with Utrata forceps for   continuous curvilinear capsulorrhexis. BSS on a Flores cannula was then used for   hydrodissection and hydrodelineation of lens. The lens was noted to spin   freely in the bag. Phacoemulsification handpiece was then used to remove the   lens in a divide-and-conquer manner. Irrigation aspiration handpiece removed   the  remaining cortical material. Provisc was placed in the eye followed by the   lens as mentioned above without any complications. Once the lens was in proper   position, irrigation aspiration handpiece was used to remove the remaining Provisc. The   wounds were then hydrated with BSS and noted to be watertight. The eye had a   good physiological pressure and the lid speculum was removed under the   microscope without any shallowing. The eye was then covered with a collagen   shield soaked in Pred Forte and Vigamox and turned over to Anesthesia in stable   condition after placement of patch and shield.

## 2018-05-16 NOTE — ANESTHESIA PREPROCEDURE EVALUATION
05/16/2018  Jose Latham is a 83 y.o., male with a pre-operative diagnosis of Nuclear sclerotic cataract of right eye [H25.11] who is scheduled for Procedure(s) (LRB):  PHACOEMULSIFICATION-ASPIRATION-CATARACT (Right)  INSERTION-INTRAOCULAR LENS (IOL) (Right).     Requested anesthesia type: Local MAC  Surgeon: Mari Dobson MD  Allergies: Review of patient's allergies indicates:  No Known Allergies  Vital Sign Range:    Chronic Medications:   Prescriptions Prior to Admission   Medication Sig Dispense Refill Last Dose    aspirin 81 MG Chew Take 81 mg by mouth once daily.   Taking    hydroCHLOROthiazide (HYDRODIURIL) 25 MG tablet TAKE 1 TABLET (25 MG TOTAL) BY MOUTH ONCE DAILY. 90 tablet 2 Taking    melatonin 3 mg Tab Take 3 mg by mouth every evening.    Taking    multivit-min-FA-lycopen-lutein (CENTRUM SILVER) 0.4-300-250 mg-mcg-mcg Tab Take by mouth.   Taking     Current Medications:   Current Facility-Administered Medications   Medication Dose Route Frequency Provider Last Rate Last Dose    ketorolac 0.5% ophthalmic solution 1 drop  1 drop Right Eye On Call Procedure Mari Dobson MD        moxifloxacin 0.5 % ophthalmic solution 1 drop  1 drop Right Eye On Call Procedure Mari Dobson MD        phenylephrine HCL 10% ophthalmic solution 1 drop  1 drop Right Eye On Call Procedure Mari Dobson MD        tropicamide 1% ophthalmic solution 1 drop  1 drop Right Eye On Call Procedure Mari Dobson MD         Medical History:   Past Medical History:   Diagnosis Date    Essential hypertension     Macular degeneration of both eyes - Both Eyes 8/22/2014    Nuclear sclerosis - Both Eyes 8/21/2013     .    Anesthesia Evaluation    I have reviewed the Patient Summary Reports.    I have reviewed the Nursing Notes.   I have reviewed the Medications.     Review of  Systems  Anesthesia Hx:  No problems with previous Anesthesia  Denies Family Hx of Anesthesia complications.   Denies Personal Hx of Anesthesia complications.   Hematology/Oncology:  Hematology Normal   Oncology Normal     EENT/Dental:EENT/Dental Normal   Cardiovascular:   Hypertension, well controlled    Pulmonary:  Pulmonary Normal    Renal/:  Renal/ Normal     Hepatic/GI:  Hepatic/GI Normal    Musculoskeletal:  Musculoskeletal Normal    Neurological:  Neurology Normal    Endocrine:  Endocrine Normal    Dermatological:  Skin Normal    Psych:  Psychiatric Normal           Physical Exam  General:  Well nourished    Airway/Jaw/Neck:  Airway Findings: Mouth Opening: Normal Tongue: Normal  General Airway Assessment: Adult, Good  Mallampati: II  Improves to II with phonation.  Jaw/Neck Findings:     Neck ROM: Normal ROM      Dental:  Dental Findings: In tact, Upper Dentures, Lower Dentures   Chest/Lungs:  Chest/Lungs Findings: Clear to auscultation, Normal Respiratory Rate     Heart/Vascular:  Heart Findings: Rate: Normal  Rhythm: Regular Rhythm  Sounds: Normal     Abdomen:  Abdomen Findings:  Normal, Soft, Nontender       Mental Status:  Mental Status Findings:  Cooperative, Alert and Oriented         Anesthesia Plan  Type of Anesthesia, risks & benefits discussed:  Anesthesia Type:  general, MAC  Patient's Preference: as indicated  Intra-op Monitoring Plan: standard ASA monitors  Intra-op Monitoring Plan Comments:   Post Op Pain Control Plan:   Post Op Pain Control Plan Comments:   Induction:   IV  Beta Blocker:  Patient is not currently on a Beta-Blocker (No further documentation required).       Informed Consent: Patient understands risks and agrees with Anesthesia plan.  Questions answered. Anesthesia consent signed with patient.  ASA Score: 2     Day of Surgery Review of History & Physical:  There are no significant changes.  H&P update referred to the provider.     Anesthesia Plan Notes: Reassurance  given.        Ready For Surgery From Anesthesia Perspective.

## 2018-05-16 NOTE — ANESTHESIA RELEASE NOTE
Anesthesia Release from PACU Note    Patient: Jose Latham    Procedure(s) Performed: Procedure(s) (LRB):  PHACOEMULSIFICATION-ASPIRATION-CATARACT (Right)  INSERTION-INTRAOCULAR LENS (IOL) (Right)    Anesthesia type: MAC    Post pain: Adequate analgesia reported    Post assessment: no apparent anesthetic complications, tolerated procedure well and no evidence of recall    Post vital signs: BP (!) 141/75   Pulse 68   Temp 36.9 °C (98.5 °F) (Temporal)   Resp 18   Ht 6' (1.829 m)   Wt 86.2 kg (190 lb)   SpO2 100%   BMI 25.77 kg/m²     Level of consciousness: awake, alert and oriented    Nausea/Vomiting: no nausea/no vomiting    Complications: none    Airway Patency: patent    Respiratory: unassisted, spontaneous ventilation, room air    Cardiovascular: stable and blood pressure at baseline    Hydration: euvolemic

## 2018-05-16 NOTE — ANESTHESIA POSTPROCEDURE EVALUATION
Anesthesia Post Evaluation    Patient: Jose Latham    Procedure(s) Performed: Procedure(s) (LRB):  PHACOEMULSIFICATION-ASPIRATION-CATARACT (Right)  INSERTION-INTRAOCULAR LENS (IOL) (Right)    Final Anesthesia Type: MAC  Patient location during evaluation: PACU  Patient participation: Yes- Able to Participate  Level of consciousness: awake and alert and oriented  Post-procedure vital signs: reviewed and stable  Pain management: adequate  Airway patency: patent  PONV status at discharge: No PONV  Anesthetic complications: no      Cardiovascular status: stable  Respiratory status: unassisted, spontaneous ventilation and room air  Hydration status: euvolemic  Follow-up not needed.        Visit Vitals  BP (!) 141/75   Pulse 68   Temp 36.9 °C (98.5 °F) (Temporal)   Resp 18   Ht 6' (1.829 m)   Wt 86.2 kg (190 lb)   SpO2 100%   BMI 25.77 kg/m²       Pain/Michael Score: Pain Assessment Performed: Yes (5/16/2018 10:10 AM)  Presence of Pain: denies (5/16/2018 10:10 AM)  Michael Score: 10 (5/16/2018 10:10 AM)

## 2018-05-16 NOTE — H&P (VIEW-ONLY)
Subjective:       Patient ID: Jose Latham is a 83 y.o. male.    Chief Complaint: Pre-op Exam    HPI  Review of Systems   Constitutional: Negative.    HENT: Negative.    Eyes: Positive for visual disturbance.   Respiratory: Negative.    Cardiovascular: Negative.    Gastrointestinal: Negative.    Endocrine: Negative.    Genitourinary: Negative.        Objective:      Physical Exam   Constitutional: He is oriented to person, place, and time. He appears well-nourished.   HENT:   Head: Normocephalic and atraumatic.   Cardiovascular: Normal rate.    Pulmonary/Chest: Effort normal.   Neurological: He is alert and oriented to person, place, and time.   Skin: Skin is warm and dry.   Psychiatric: He has a normal mood and affect.       Assessment:       1. Nuclear sclerotic cataract of right eye    2. Nuclear sclerotic cataract of both eyes    3. Cataracta brunescens of both eyes    4. Early dry stage nonexudative age-related macular degeneration of both eyes        Plan:       Complex phaco/IOL od - dense brown lens - trypan blue

## 2018-05-16 NOTE — INTERVAL H&P NOTE
H&P completed on 5/4/2018  has been reviewed, the patient examined and I concur with the findings and plan.

## 2018-05-16 NOTE — DISCHARGE INSTRUCTIONS
Discharge Instructions for Cataract Surgery  A surgeon removed the cloudy lens in your eye and replaced it with a clear man-made lens. Be sure to have an adult family member or friend drive you home after surgery. Heres what you can expect following surgery and tips for a healthy recovery.  It is normal to have the following:  · Bruised or bloodshot eye for 7 days  · Itching and mild discomfort for several days  · Some fluid discharge  · Sensitivity to light  · Scratchy, sandlike feeling in the eye for 2 weeks  · Feeling tired, especially during the first 24 hours  Activity level  · Do not drive for 2 days or as instructed by your doctor.  · Do not drink alcohol for at least 24 hours.  · Avoid bending at the waist to  objects or lifting anything heavy for 2 days.  · Relax for the first 24 hours after surgery. Watching TV and reading are OK and wont harm your eye.  Eye protection  · Do not rub or press on your eye.  · Sleep on your back or on your unoperated side for 2 nights.  · If instructed, wear a bandage over your eye for 2 days and 2 nights.  · If instructed, wear a shield to protect your eye for 2 days and 2 nights.  Using eyedrops  You may be given special eyedrops or ointment. Here is one way to use eyedrops:  · Tilt your head back.  · Pull your bottom eyelid down.  · Squeeze one drop into your eye. Do not touch your eye with the bottle tip.  · Close your eyes for a few seconds.  · If you need more than one drop, wait a few minutes before adding the next one.   Call your doctor right away if you have any of the following:  · Bleeding or discharge from the eye  · Your vision suddenly becomes worse  · Pain medicine you are told to take does not ease your pain  · Nausea or vomiting  · Chills or fever over 100.4°F (39.1°C)   Date Last Reviewed: 5/30/2015  © 7350-7171 [employer]. [address] [place] [address], [place] [address]. All rights reserved. This information is not intended as a  substitute for professional medical care. Always follow your healthcare professional's instructions.

## 2018-05-16 NOTE — TRANSFER OF CARE
Anesthesia Transfer of Care Note    Patient: Jose Latham    Procedure(s) Performed: Procedure(s) (LRB):  PHACOEMULSIFICATION-ASPIRATION-CATARACT (Right)  INSERTION-INTRAOCULAR LENS (IOL) (Right)    Patient location: PACU    Anesthesia Type: general    Transport from OR: Transported from OR on room air with adequate spontaneous ventilation    Post pain: adequate analgesia    Post assessment: no apparent anesthetic complications and tolerated procedure well    Post vital signs: stable    Level of consciousness: awake    Nausea/Vomiting: no nausea/vomiting    Complications: none    Transfer of care protocol was followed      Last vitals:   Visit Vitals  BP (!) 160/79 (BP Location: Right arm, Patient Position: Lying)   Pulse 67   Temp 37 °C (98.6 °F) (Temporal)   Resp 17   Ht 6' (1.829 m)   Wt 86.2 kg (190 lb)   SpO2 100%   BMI 25.77 kg/m²

## 2018-05-17 ENCOUNTER — OFFICE VISIT (OUTPATIENT)
Dept: OPHTHALMOLOGY | Facility: CLINIC | Age: 83
End: 2018-05-17
Payer: MEDICARE

## 2018-05-17 VITALS — HEART RATE: 58 BPM | SYSTOLIC BLOOD PRESSURE: 110 MMHG | DIASTOLIC BLOOD PRESSURE: 72 MMHG

## 2018-05-17 DIAGNOSIS — Z96.1 PSEUDOPHAKIA, RIGHT EYE: ICD-10-CM

## 2018-05-17 DIAGNOSIS — H35.3131 EARLY DRY STAGE NONEXUDATIVE AGE-RELATED MACULAR DEGENERATION OF BOTH EYES: ICD-10-CM

## 2018-05-17 DIAGNOSIS — Z98.49 POSTOPERATIVE CARE FOR CATARACT: Primary | ICD-10-CM

## 2018-05-17 DIAGNOSIS — Z48.810 POSTOPERATIVE CARE FOR CATARACT: Primary | ICD-10-CM

## 2018-05-17 DIAGNOSIS — H25.12 SENILE NUCLEAR SCLEROSIS, LEFT: ICD-10-CM

## 2018-05-17 DIAGNOSIS — H25.12 CATARACTA BRUNESCENS, LEFT: ICD-10-CM

## 2018-05-17 PROCEDURE — 99999 PR PBB SHADOW E&M-EST. PATIENT-LVL II: CPT | Mod: PBBFAC,,, | Performed by: OPHTHALMOLOGY

## 2018-05-17 PROCEDURE — 99024 POSTOP FOLLOW-UP VISIT: CPT | Mod: S$GLB,,, | Performed by: OPHTHALMOLOGY

## 2018-05-17 RX ORDER — MOXIFLOXACIN 5 MG/ML
1 SOLUTION/ DROPS OPHTHALMIC 4 TIMES DAILY
COMMUNITY
Start: 2018-05-17 | End: 2018-05-26

## 2018-05-17 RX ORDER — PREDNISOLONE ACETATE 10 MG/ML
1 SUSPENSION/ DROPS OPHTHALMIC 4 TIMES DAILY
COMMUNITY
Start: 2018-05-17 | End: 2018-05-24 | Stop reason: SDUPTHER

## 2018-05-17 NOTE — PROGRESS NOTES
HPI     Post-op Evaluation    Additional comments: Pt states no complaints last night           Comments   1 day po Phaco IOL OD 5/16/18       Last edited by Zandra Sandoval MA on 5/17/2018  8:39 AM. (History)            Assessment /Plan     For exam results, see Encounter Report.    Postoperative care for cataract    Senile nuclear sclerosis, left    Cataracta brunescens, left    Early dry stage nonexudative age-related macular degeneration of both eyes    Pseudophakia, right eye        HIS WIFE IS A LONG TIME PATIENT OF MINE    From [place]     NS ou   Vis sign ou    od > OS    Denies flomax     2 early dry armd ou    OCT       PLAN    Phaco/IOL od first - if does ok can do os latter    Trypan blue // brown lens    Phaco / IOL OD Date: 5/16/2018 - trypan blue - PCB00 19.0  POD # 1 - phaco/IOL   Doing well - some corneal edema POD 1   Begin Pred Acetate QID   Begin vigamox QID  Shield at night  Glasses day  No lifting, no bending, no eye rubbing  F/U 1 week, AR/MR ou    IOL calc OD - aim for pano to -0.50   PCB00 19.0  AC IOL 15.5

## 2018-05-24 ENCOUNTER — OFFICE VISIT (OUTPATIENT)
Dept: OPHTHALMOLOGY | Facility: CLINIC | Age: 83
End: 2018-05-24
Payer: MEDICARE

## 2018-05-24 DIAGNOSIS — Z98.49 POSTOPERATIVE CARE FOR CATARACT: Primary | ICD-10-CM

## 2018-05-24 DIAGNOSIS — Z96.1 PSEUDOPHAKIA, RIGHT EYE: ICD-10-CM

## 2018-05-24 DIAGNOSIS — H25.12 CATARACTA BRUNESCENS, LEFT: ICD-10-CM

## 2018-05-24 DIAGNOSIS — H25.12 SENILE NUCLEAR SCLEROSIS, LEFT: ICD-10-CM

## 2018-05-24 DIAGNOSIS — Z48.810 POSTOPERATIVE CARE FOR CATARACT: Primary | ICD-10-CM

## 2018-05-24 DIAGNOSIS — H35.3131 EARLY DRY STAGE NONEXUDATIVE AGE-RELATED MACULAR DEGENERATION OF BOTH EYES: ICD-10-CM

## 2018-05-24 PROCEDURE — 99024 POSTOP FOLLOW-UP VISIT: CPT | Mod: S$GLB,,, | Performed by: OPHTHALMOLOGY

## 2018-05-24 PROCEDURE — 99999 PR PBB SHADOW E&M-EST. PATIENT-LVL II: CPT | Mod: PBBFAC,,, | Performed by: OPHTHALMOLOGY

## 2018-05-24 RX ORDER — PREDNISOLONE ACETATE 10 MG/ML
1 SUSPENSION/ DROPS OPHTHALMIC 3 TIMES DAILY
Qty: 1 BOTTLE | Refills: 1 | Status: SHIPPED | OUTPATIENT
Start: 2018-05-24 | End: 2018-07-06 | Stop reason: ALTCHOICE

## 2018-05-24 NOTE — PROGRESS NOTES
HPI     1 week po - S/P Phaco IOL OD 5/16/18    DLS- 05/17/2018 Dr. Dobson  Pt sts improvement in va, denies pain just itching.   (-)Flashes (-)Floaters  (-)Photophobia  (-)Glare    Acetate QID - OD  vigamox QID - OD   Shield at night    Last edited by Mari Dobson MD on 5/24/2018 10:06 AM. (History)            Assessment /Plan     For exam results, see Encounter Report.    Postoperative care for cataract    Senile nuclear sclerosis, left    Cataracta brunescens, left    Early dry stage nonexudative age-related macular degeneration of both eyes    Pseudophakia, right eye          HIS WIFE IS A LONG TIME PATIENT OF MINE    From [place]     NS ou   Vis sign ou    od > OS    Denies flomax     2 early dry armd ou    OCT       PLAN    Phaco/IOL od first - if does ok can do os latter    Trypan blue // brown lens    Phaco / IOL OD Date: 5/16/2018 - trypan blue - PCB00 19.0  POW # 1 - phaco/IOL   Doing well - some corneal edema POD 1 (( BCVA 20/25 at 1 week))   Begin Pred Acetate QID - begin taper   Begin vigamox QID - finish   Shield at night - 1 more week   Glasses day  No lifting, no bending, no eye rubbing  F/U 1 month  AR/MR ou    IOL calc OD - aim for pano to -0.50   PCB00 19.0  AC IOL 15.5    Pt would like to get 2nd eye done   Schedule - complex phaco/IOL OS - dense brown lens - trypan blue - extra viscoat   F/u pre-op os and post op od -

## 2018-06-06 ENCOUNTER — TELEPHONE (OUTPATIENT)
Dept: OPHTHALMOLOGY | Facility: CLINIC | Age: 83
End: 2018-06-06

## 2018-06-06 DIAGNOSIS — H25.12 NUCLEAR SCLEROTIC CATARACT OF LEFT EYE: Primary | ICD-10-CM

## 2018-07-06 ENCOUNTER — OFFICE VISIT (OUTPATIENT)
Dept: OPHTHALMOLOGY | Facility: CLINIC | Age: 83
End: 2018-07-06
Payer: MEDICARE

## 2018-07-06 DIAGNOSIS — Z98.49 POSTOPERATIVE CARE FOR CATARACT: ICD-10-CM

## 2018-07-06 DIAGNOSIS — H35.3131 EARLY DRY STAGE NONEXUDATIVE AGE-RELATED MACULAR DEGENERATION OF BOTH EYES: ICD-10-CM

## 2018-07-06 DIAGNOSIS — H25.12 NUCLEAR SCLEROTIC CATARACT OF LEFT EYE: Primary | ICD-10-CM

## 2018-07-06 DIAGNOSIS — H25.12 CATARACTA BRUNESCENS, LEFT: ICD-10-CM

## 2018-07-06 DIAGNOSIS — Z48.810 POSTOPERATIVE CARE FOR CATARACT: ICD-10-CM

## 2018-07-06 DIAGNOSIS — Z96.1 PSEUDOPHAKIA, RIGHT EYE: ICD-10-CM

## 2018-07-06 PROCEDURE — 99499 UNLISTED E&M SERVICE: CPT | Mod: S$GLB,,, | Performed by: OPHTHALMOLOGY

## 2018-07-06 PROCEDURE — 99999 PR PBB SHADOW E&M-EST. PATIENT-LVL II: CPT | Mod: PBBFAC,,, | Performed by: OPHTHALMOLOGY

## 2018-07-06 PROCEDURE — 92136 OPHTHALMIC BIOMETRY: CPT | Mod: 26,LT,S$GLB, | Performed by: OPHTHALMOLOGY

## 2018-07-06 RX ORDER — MOXIFLOXACIN 5 MG/ML
1 SOLUTION/ DROPS OPHTHALMIC
Status: CANCELLED | OUTPATIENT
Start: 2018-07-06

## 2018-07-06 RX ORDER — SODIUM CHLORIDE 9 MG/ML
INJECTION, SOLUTION INTRAVENOUS CONTINUOUS
Status: CANCELLED | OUTPATIENT
Start: 2018-07-06

## 2018-07-06 RX ORDER — PHENYLEPHRINE HYDROCHLORIDE 100 MG/ML
1 SOLUTION/ DROPS OPHTHALMIC
Status: CANCELLED | OUTPATIENT
Start: 2018-07-06

## 2018-07-06 RX ORDER — KETOROLAC TROMETHAMINE 5 MG/ML
1 SOLUTION OPHTHALMIC
Status: CANCELLED | OUTPATIENT
Start: 2018-07-06

## 2018-07-06 RX ORDER — LIDOCAINE HYDROCHLORIDE 10 MG/ML
1 INJECTION, SOLUTION EPIDURAL; INFILTRATION; INTRACAUDAL; PERINEURAL ONCE
Status: CANCELLED | OUTPATIENT
Start: 2018-07-06 | End: 2018-07-06

## 2018-07-06 RX ORDER — TROPICAMIDE 10 MG/ML
1 SOLUTION/ DROPS OPHTHALMIC
Status: CANCELLED | OUTPATIENT
Start: 2018-07-06

## 2018-07-06 NOTE — PROGRESS NOTES
HPI     Pre-op Exam    Additional comments: phaco iol os           Comments   Preop Complex Phaco IOL OS (sx 7/18/18)  S/p Complex Phaco IOL OD 5/16/18    1. NS OS  2. PCIOL OD  3. Early Dry ARMD OU       Last edited by Zandra Sandoval MA on 7/6/2018  1:14 PM. (History)            Assessment /Plan     For exam results, see Encounter Report.    Nuclear sclerotic cataract of left eye    Cataracta brunescens, left    Early dry stage nonexudative age-related macular degeneration of both eyes    Pseudophakia, right eye    Postoperative care for cataract        HIS WIFE IS A LONG TIME PATIENT OF MINE    From [place]     NS ou   Vis sign ou    od > OS    Denies flomax     2 early dry armd ou    OCT       PLAN    Phaco/IOL od first - if does ok can do os latter    Trypan blue // brown lens    Phaco / IOL OD Date: 5/16/2018 - trypan blue - PCB00 19.0  POM # 1.5 - phaco/IOL   Doing well -     IOL calc OD - aim for pano to -0.50   PCB00 19.0  AC IOL 15.5    Pt would like to get 2nd eye done   Schedule - complex phaco/IOL OS - dense brown lens - trypan blue - extra viscoat     IOL calc OS   PCB00 19.0  AC IOL 15.5    Risks and benefits discussed and consent signed.

## 2018-07-06 NOTE — H&P
Subjective:       Patient ID: Jose Latham is a 83 y.o. male.    Chief Complaint: Pre-op Exam (phaco iol os)    HPI  Review of Systems   Constitutional: Negative.    HENT: Negative.    Eyes: Positive for visual disturbance.   Respiratory: Negative.    Cardiovascular: Negative.    Gastrointestinal: Negative.    Endocrine: Negative.    Genitourinary: Negative.        Objective:      Physical Exam   Constitutional: He is oriented to person, place, and time. He appears well-nourished.   HENT:   Head: Normocephalic and atraumatic.   Eyes:   See eye exam   Cardiovascular: Normal rate.    Pulmonary/Chest: Effort normal.   Neurological: He is alert and oriented to person, place, and time.   Skin: Skin is warm and dry.   Psychiatric: He has a normal mood and affect.       Assessment:       1. Nuclear sclerotic cataract of left eye    2. Cataracta brunescens, left    3. Early dry stage nonexudative age-related macular degeneration of both eyes    4. Pseudophakia, right eye    5. Postoperative care for cataract        Plan:       Complex phaco/IOL os - 2nd eye - dense lens - use trypan blue and extra viscoat

## 2018-07-17 ENCOUNTER — TELEPHONE (OUTPATIENT)
Dept: OPHTHALMOLOGY | Facility: CLINIC | Age: 83
End: 2018-07-17

## 2018-07-17 ENCOUNTER — TELEPHONE (OUTPATIENT)
Dept: OPTOMETRY | Facility: CLINIC | Age: 83
End: 2018-07-17

## 2018-07-17 NOTE — TELEPHONE ENCOUNTER
Left a voicemail for the patient and gave surgery arrival time 8:00am, also no food or drink after midnight.

## 2018-07-18 ENCOUNTER — HOSPITAL ENCOUNTER (OUTPATIENT)
Facility: HOSPITAL | Age: 83
Discharge: HOME | End: 2018-07-18
Attending: OPHTHALMOLOGY | Admitting: OPHTHALMOLOGY
Payer: MEDICARE

## 2018-07-18 ENCOUNTER — ANESTHESIA EVENT (OUTPATIENT)
Dept: SURGERY | Facility: HOSPITAL | Age: 83
End: 2018-07-18
Payer: MEDICARE

## 2018-07-18 ENCOUNTER — ANESTHESIA (OUTPATIENT)
Dept: SURGERY | Facility: HOSPITAL | Age: 83
End: 2018-07-18
Payer: MEDICARE

## 2018-07-18 VITALS
BODY MASS INDEX: 25.73 KG/M2 | OXYGEN SATURATION: 97 % | HEART RATE: 68 BPM | HEIGHT: 72 IN | DIASTOLIC BLOOD PRESSURE: 77 MMHG | WEIGHT: 190 LBS | TEMPERATURE: 98 F | SYSTOLIC BLOOD PRESSURE: 148 MMHG | RESPIRATION RATE: 16 BRPM

## 2018-07-18 DIAGNOSIS — H25.12 CATARACTA BRUNESCENS, LEFT: ICD-10-CM

## 2018-07-18 DIAGNOSIS — H25.12 NUCLEAR SCLEROTIC CATARACT OF LEFT EYE: Primary | ICD-10-CM

## 2018-07-18 PROCEDURE — 36000706: Performed by: OPHTHALMOLOGY

## 2018-07-18 PROCEDURE — 37000008 HC ANESTHESIA 1ST 15 MINUTES: Performed by: OPHTHALMOLOGY

## 2018-07-18 PROCEDURE — 25000003 PHARM REV CODE 250: Performed by: ANESTHESIOLOGY

## 2018-07-18 PROCEDURE — 66982 XCAPSL CTRC RMVL CPLX WO ECP: CPT | Mod: 79,LT,, | Performed by: OPHTHALMOLOGY

## 2018-07-18 PROCEDURE — D9220A PRA ANESTHESIA: Mod: CRNA,,, | Performed by: NURSE ANESTHETIST, CERTIFIED REGISTERED

## 2018-07-18 PROCEDURE — 27201423 OPTIME MED/SURG SUP & DEVICES STERILE SUPPLY: Performed by: OPHTHALMOLOGY

## 2018-07-18 PROCEDURE — 36000707: Performed by: OPHTHALMOLOGY

## 2018-07-18 PROCEDURE — 37000009 HC ANESTHESIA EA ADD 15 MINS: Performed by: OPHTHALMOLOGY

## 2018-07-18 PROCEDURE — 71000015 HC POSTOP RECOV 1ST HR: Performed by: OPHTHALMOLOGY

## 2018-07-18 PROCEDURE — V2632 POST CHMBR INTRAOCULAR LENS: HCPCS | Performed by: OPHTHALMOLOGY

## 2018-07-18 PROCEDURE — D9220A PRA ANESTHESIA: Mod: ANES,,, | Performed by: ANESTHESIOLOGY

## 2018-07-18 PROCEDURE — 25000003 PHARM REV CODE 250: Performed by: OPHTHALMOLOGY

## 2018-07-18 PROCEDURE — 63600175 PHARM REV CODE 636 W HCPCS: Performed by: OPHTHALMOLOGY

## 2018-07-18 PROCEDURE — 63600175 PHARM REV CODE 636 W HCPCS: Performed by: NURSE ANESTHETIST, CERTIFIED REGISTERED

## 2018-07-18 DEVICE — LENS IOL ITEC PRELOAD 19.0D: Type: IMPLANTABLE DEVICE | Site: EYE | Status: FUNCTIONAL

## 2018-07-18 RX ORDER — LIDOCAINE HYDROCHLORIDE 10 MG/ML
INJECTION, SOLUTION EPIDURAL; INFILTRATION; INTRACAUDAL; PERINEURAL
Status: DISCONTINUED | OUTPATIENT
Start: 2018-07-18 | End: 2018-07-18 | Stop reason: HOSPADM

## 2018-07-18 RX ORDER — ONDANSETRON 2 MG/ML
4 INJECTION INTRAMUSCULAR; INTRAVENOUS DAILY PRN
Status: DISCONTINUED | OUTPATIENT
Start: 2018-07-18 | End: 2018-07-18 | Stop reason: HOSPADM

## 2018-07-18 RX ORDER — LIDOCAINE HYDROCHLORIDE 10 MG/ML
1 INJECTION, SOLUTION EPIDURAL; INFILTRATION; INTRACAUDAL; PERINEURAL ONCE
Status: DISCONTINUED | OUTPATIENT
Start: 2018-07-18 | End: 2018-07-18 | Stop reason: HOSPADM

## 2018-07-18 RX ORDER — FENTANYL CITRATE 50 UG/ML
INJECTION, SOLUTION INTRAMUSCULAR; INTRAVENOUS
Status: DISCONTINUED | OUTPATIENT
Start: 2018-07-18 | End: 2018-07-18

## 2018-07-18 RX ORDER — TROPICAMIDE 10 MG/ML
SOLUTION/ DROPS OPHTHALMIC
Status: DISCONTINUED
Start: 2018-07-18 | End: 2018-07-18 | Stop reason: HOSPADM

## 2018-07-18 RX ORDER — MOXIFLOXACIN 5 MG/ML
SOLUTION/ DROPS OPHTHALMIC
Status: DISCONTINUED
Start: 2018-07-18 | End: 2018-07-18 | Stop reason: HOSPADM

## 2018-07-18 RX ORDER — ACETAMINOPHEN 325 MG/1
650 TABLET ORAL EVERY 6 HOURS PRN
Status: DISCONTINUED | OUTPATIENT
Start: 2018-07-18 | End: 2018-07-18 | Stop reason: HOSPADM

## 2018-07-18 RX ORDER — ACETAZOLAMIDE 500 MG/1
500 CAPSULE, EXTENDED RELEASE ORAL ONCE
Status: COMPLETED | OUTPATIENT
Start: 2018-07-18 | End: 2018-07-18

## 2018-07-18 RX ORDER — SODIUM CHLORIDE 9 MG/ML
INJECTION, SOLUTION INTRAVENOUS CONTINUOUS
Status: DISCONTINUED | OUTPATIENT
Start: 2018-07-18 | End: 2018-07-18 | Stop reason: HOSPADM

## 2018-07-18 RX ORDER — EPINEPHRINE 1 MG/ML
INJECTION, SOLUTION INTRACARDIAC; INTRAMUSCULAR; INTRAVENOUS; SUBCUTANEOUS
Status: DISCONTINUED | OUTPATIENT
Start: 2018-07-18 | End: 2018-07-18 | Stop reason: HOSPADM

## 2018-07-18 RX ORDER — SODIUM CHLORIDE 0.9 % (FLUSH) 0.9 %
3 SYRINGE (ML) INJECTION
Status: CANCELLED | OUTPATIENT
Start: 2018-07-18

## 2018-07-18 RX ORDER — PHENYLEPHRINE HYDROCHLORIDE 100 MG/ML
SOLUTION/ DROPS OPHTHALMIC
Status: DISCONTINUED
Start: 2018-07-18 | End: 2018-07-18 | Stop reason: HOSPADM

## 2018-07-18 RX ORDER — EPINEPHRINE 1 MG/ML
INJECTION, SOLUTION INTRACARDIAC; INTRAMUSCULAR; INTRAVENOUS; SUBCUTANEOUS
Status: DISCONTINUED
Start: 2018-07-18 | End: 2018-07-18 | Stop reason: HOSPADM

## 2018-07-18 RX ORDER — LIDOCAINE HYDROCHLORIDE 20 MG/ML
INJECTION, SOLUTION EPIDURAL; INFILTRATION; INTRACAUDAL; PERINEURAL
Status: DISCONTINUED | OUTPATIENT
Start: 2018-07-18 | End: 2018-07-18 | Stop reason: HOSPADM

## 2018-07-18 RX ORDER — TROPICAMIDE 10 MG/ML
1 SOLUTION/ DROPS OPHTHALMIC
Status: DISCONTINUED | OUTPATIENT
Start: 2018-07-18 | End: 2018-07-18 | Stop reason: HOSPADM

## 2018-07-18 RX ORDER — LIDOCAINE HYDROCHLORIDE 20 MG/ML
INJECTION, SOLUTION EPIDURAL; INFILTRATION; INTRACAUDAL; PERINEURAL
Status: DISCONTINUED
Start: 2018-07-18 | End: 2018-07-18 | Stop reason: HOSPADM

## 2018-07-18 RX ORDER — MIDAZOLAM HYDROCHLORIDE 1 MG/ML
INJECTION, SOLUTION INTRAMUSCULAR; INTRAVENOUS
Status: DISCONTINUED | OUTPATIENT
Start: 2018-07-18 | End: 2018-07-18

## 2018-07-18 RX ORDER — PREDNISOLONE ACETATE 10 MG/ML
SUSPENSION/ DROPS OPHTHALMIC
Status: DISCONTINUED
Start: 2018-07-18 | End: 2018-07-18 | Stop reason: HOSPADM

## 2018-07-18 RX ORDER — LIDOCAINE HYDROCHLORIDE 10 MG/ML
1 INJECTION, SOLUTION EPIDURAL; INFILTRATION; INTRACAUDAL; PERINEURAL ONCE
Status: COMPLETED | OUTPATIENT
Start: 2018-07-18 | End: 2018-07-18

## 2018-07-18 RX ORDER — LIDOCAINE HYDROCHLORIDE 20 MG/ML
JELLY TOPICAL
Status: DISCONTINUED
Start: 2018-07-18 | End: 2018-07-18 | Stop reason: HOSPADM

## 2018-07-18 RX ORDER — MOXIFLOXACIN 5 MG/ML
1 SOLUTION/ DROPS OPHTHALMIC
Status: DISCONTINUED | OUTPATIENT
Start: 2018-07-18 | End: 2018-07-18 | Stop reason: HOSPADM

## 2018-07-18 RX ORDER — PREDNISOLONE ACETATE 10 MG/ML
SUSPENSION/ DROPS OPHTHALMIC
Status: DISCONTINUED | OUTPATIENT
Start: 2018-07-18 | End: 2018-07-18 | Stop reason: HOSPADM

## 2018-07-18 RX ORDER — LIDOCAINE HYDROCHLORIDE 20 MG/ML
JELLY TOPICAL
Status: DISCONTINUED | OUTPATIENT
Start: 2018-07-18 | End: 2018-07-18 | Stop reason: HOSPADM

## 2018-07-18 RX ORDER — MOXIFLOXACIN 5 MG/ML
SOLUTION/ DROPS OPHTHALMIC
Status: DISCONTINUED | OUTPATIENT
Start: 2018-07-18 | End: 2018-07-18 | Stop reason: HOSPADM

## 2018-07-18 RX ORDER — LIDOCAINE HYDROCHLORIDE 10 MG/ML
INJECTION, SOLUTION EPIDURAL; INFILTRATION; INTRACAUDAL; PERINEURAL
Status: DISCONTINUED
Start: 2018-07-18 | End: 2018-07-18 | Stop reason: HOSPADM

## 2018-07-18 RX ORDER — KETOROLAC TROMETHAMINE 5 MG/ML
SOLUTION OPHTHALMIC
Status: DISCONTINUED
Start: 2018-07-18 | End: 2018-07-18 | Stop reason: HOSPADM

## 2018-07-18 RX ORDER — PHENYLEPHRINE HYDROCHLORIDE 100 MG/ML
1 SOLUTION/ DROPS OPHTHALMIC
Status: DISCONTINUED | OUTPATIENT
Start: 2018-07-18 | End: 2018-07-18 | Stop reason: HOSPADM

## 2018-07-18 RX ORDER — FENTANYL CITRATE 50 UG/ML
25 INJECTION, SOLUTION INTRAMUSCULAR; INTRAVENOUS EVERY 5 MIN PRN
Status: DISCONTINUED | OUTPATIENT
Start: 2018-07-18 | End: 2018-07-18 | Stop reason: HOSPADM

## 2018-07-18 RX ORDER — KETOROLAC TROMETHAMINE 5 MG/ML
1 SOLUTION OPHTHALMIC
Status: DISCONTINUED | OUTPATIENT
Start: 2018-07-18 | End: 2018-07-18 | Stop reason: HOSPADM

## 2018-07-18 RX ADMIN — SODIUM CHLORIDE: 0.9 INJECTION, SOLUTION INTRAVENOUS at 08:07

## 2018-07-18 RX ADMIN — MOXIFLOXACIN HYDROCHLORIDE 1 DROP: 5 SOLUTION/ DROPS OPHTHALMIC at 08:07

## 2018-07-18 RX ADMIN — MIDAZOLAM HYDROCHLORIDE 1 MG: 1 INJECTION, SOLUTION INTRAMUSCULAR; INTRAVENOUS at 09:07

## 2018-07-18 RX ADMIN — KETOROLAC TROMETHAMINE 1 DROP: 5 SOLUTION OPHTHALMIC at 08:07

## 2018-07-18 RX ADMIN — ACETAZOLAMIDE 500 MG: 500 CAPSULE, EXTENDED RELEASE ORAL at 09:07

## 2018-07-18 RX ADMIN — LIDOCAINE HYDROCHLORIDE 1 MG: 10 INJECTION, SOLUTION EPIDURAL; INFILTRATION; INTRACAUDAL; PERINEURAL at 08:07

## 2018-07-18 RX ADMIN — PHENYLEPHRINE HYDROCHLORIDE 1 DROP: 100 SOLUTION/ DROPS OPHTHALMIC at 08:07

## 2018-07-18 RX ADMIN — FENTANYL CITRATE 25 MCG: 50 INJECTION, SOLUTION INTRAMUSCULAR; INTRAVENOUS at 09:07

## 2018-07-18 RX ADMIN — TROPICAMIDE 1 DROP: 10 SOLUTION/ DROPS OPHTHALMIC at 08:07

## 2018-07-18 NOTE — ANESTHESIA PREPROCEDURE EVALUATION
07/18/2018  Jose Latham is a 83 y.o., male.    Anesthesia Evaluation         Review of Systems  Anesthesia Hx:  No problems with previous Anesthesia   Social:  Former Smoker, Social Alcohol Use    Cardiovascular:   Exercise tolerance: good Hypertension, well controlled  Denies Angina.    Pulmonary:  Pulmonary Normal    Neurological:  Neurology Normal    Endocrine:  Endocrine Normal        Physical Exam  General:  Well nourished    Airway/Jaw/Neck:  Airway Findings: Mouth Opening: Normal Tongue: Normal  General Airway Assessment: Adult  Mallampati: II  TM Distance: Normal, at least 6 cm      Dental:  Dental Findings: Upper Dentures, Lower Dentures   Chest/Lungs:  Chest/Lungs Findings: Clear to auscultation, Normal Respiratory Rate     Heart/Vascular:  Heart Findings: Rate: Normal  Rhythm: Regular Rhythm  Sounds: Normal        Mental Status:  Mental Status Findings:  Cooperative, Alert and Oriented         Anesthesia Plan  Type of Anesthesia, risks & benefits discussed:  Anesthesia Type:  general, MAC  Patient's Preference:   Intra-op Monitoring Plan: standard ASA monitors  Intra-op Monitoring Plan Comments:   Post Op Pain Control Plan: multimodal analgesia, IV/PO Opioids PRN and per primary service following discharge from PACU  Post Op Pain Control Plan Comments:   Induction:   IV  Beta Blocker:  Patient is not currently on a Beta-Blocker (No further documentation required).       Informed Consent: Patient understands risks and agrees with Anesthesia plan.  Questions answered. Anesthesia consent signed with patient.  ASA Score: 2     Day of Surgery Review of History & Physical:            Ready For Surgery From Anesthesia Perspective.

## 2018-07-18 NOTE — DISCHARGE INSTRUCTIONS
Discharge Instructions for Cataract Surgery  A surgeon removed the cloudy lens in your eye and replaced it with a clear man-made lens. Be sure to have an adult family member or friend drive you home after surgery. Heres what you can expect following surgery and tips for a healthy recovery.  It is normal to have the following:  · Bruised or bloodshot eye for 7 days  · Itching and mild discomfort for several days  · Some fluid discharge  · Sensitivity to light  · Scratchy, sandlike feeling in the eye for 2 weeks  · Feeling tired, especially during the first 24 hours  Activity level  · Do not drive for 2 days or as instructed by your doctor.  · Do not drink alcohol for at least 24 hours.  · Avoid bending at the waist to  objects or lifting anything heavy for 2 days.  · Relax for the first 24 hours after surgery. Watching TV and reading are OK and wont harm your eye.  Eye protection  · Do not rub or press on your eye.  · Sleep on your back or on your unoperated side for 2 nights.  · If instructed, wear a bandage over your eye for 2 days and 2 nights.  · If instructed, wear a shield to protect your eye for 2 days and 2 nights.  Using eyedrops  You may be given special eyedrops or ointment. Here is one way to use eyedrops:  · Tilt your head back.  · Pull your bottom eyelid down.  · Squeeze one drop into your eye. Do not touch your eye with the bottle tip.  · Close your eyes for a few seconds.  · If you need more than one drop, wait a few minutes before adding the next one.   Call your doctor right away if you have any of the following:  · Bleeding or discharge from the eye  · Your vision suddenly becomes worse  · Pain medicine you are told to take does not ease your pain  · Nausea or vomiting  · Chills or fever over 100.4°F (39.1°C)   Date Last Reviewed: 5/30/2015  © 5437-5652 [employer]. [address] [place] [address], [place] [address]. All rights reserved. This information is not intended as a  substitute for professional medical care. Always follow your healthcare professional's instructions.

## 2018-07-18 NOTE — OP NOTE
DATE OF PROCEDURE: 7/18/2018    PREOPERATIVE DIAGNOSIS: Complex Cataract - dense / brunescent  - poor red reflex // Nuclear sclerotic cataract of left eye OS.     POSTOPERATIVE DIAGNOSIS: Complex Cataract - dense brunescent - poor red reflex // Nuclear sclerotic cataract of left eyeOS, status post procedure.     PROCEDURE PERFORMED: Complex Cataract extraction with trypan blue and extra viscoelastic and  phacoemulsification, posterior   chamber intraocular lens placement.     SURGEON: Mari Dobson M.D.     ASSISTANT: Maura Carlton MD      COMPLICATIONS: None.     BLOOD LOSS: Less than 5 mL.     ANESTHESIA: Local MAC    IMPLANT DATA:   1. Abbott PCB00 , power 19.0 diopters, serial #[id number]    PROCEDURE IN DETAIL: After informed consent including risks, benefits,   alternatives, the patient was brought to the operating room table, placed in   supine position. Monitored anesthesia care was used throughout the entire   procedure. Once adequate anesthesia was confirmed, the patient was then prepped   and draped in usual sterile fashion for intraocular surgery. Wire speculum was   used to hold the eyelids apart and the procedure was initiated by making   a partial thickness corneal wound with a cadence blade temporally.   A supersharp blade was then used to make a second entry into the eye via a paracentesis incision.  Intracameral lidocaine followed by trypan blue, followed by Viscoat were placed in the anterior chamber.   A 2.4 mm blade was then used to make to complete the clear corneal   incision temporally. A cystotome was used to make a tear in   the anterior capsular flap, which was continued around with Utrata forceps for   continuous curvilinear capsulorrhexis. BSS on a Flores cannula was then used for   hydrodissection and hydrodelineation of lens. The lens was noted to spin   freely in the bag. More viscoelastic was p[laced in the anterior chamber to protect the corneal endothelium.  Phacoemulsification handpiece was then used to remove the   lens in a divide-and-conquer manner. Irrigation aspiration handpiece removed   the remaining cortical material. Provisc was placed in the eye followed by the   lens as mentioned above without any complications. Once the lens was in proper   position, irrigation aspiration handpiece was used to remove the remaining Provisc. The   wounds were then hydrated with BSS and noted to be watertight. The eye had a   good physiological pressure and the lid speculum was removed under the   microscope without any shallowing. The eye was then covered with a collagen   shield soaked in Pred Forte and Vigamox and turned over to Anesthesia in stable   condition after placement of patch and shield.

## 2018-07-18 NOTE — INTERVAL H&P NOTE
H&P completed on 7/6/2018 has been reviewed, the patient examined and I concur with the findings and plan.

## 2018-07-18 NOTE — TRANSFER OF CARE
Anesthesia Transfer of Care Note    Patient: Jose Latham    Procedure(s) Performed: Procedure(s) (LRB):  PHACOEMULSIFICATION, CATARACT (Left)  INSERTION, INTRAOCULAR LENS PROSTHESIS (Left)    Patient location: PACU    Anesthesia Type: MAC    Transport from OR: Transported from OR on room air with adequate spontaneous ventilation    Post pain: adequate analgesia    Post assessment: no apparent anesthetic complications    Post vital signs: stable    Level of consciousness: awake, alert and oriented    Nausea/Vomiting: no nausea/vomiting    Complications: none    Transfer of care protocol was followed      Last vitals:   Visit Vitals  BP (!) 142/75   Pulse 60   Temp 36.5 °C (97.7 °F) (Skin)   Resp 16   Ht 6' (1.829 m)   Wt 86.2 kg (190 lb)   SpO2 95%   BMI 25.77 kg/m²

## 2018-07-18 NOTE — DISCHARGE SUMMARY
Date of Procedure / Discharge: 07/18/2018     PreOp Diagnosis: Complex Cataract OS      PostOp Diagnosis:as above s/p procedure    Procedure:Complex Cataract Extraction with Phacoemulsification and trypan blue  w/ Posterior Chamber IOL Implantation    Attending:Mari Dobson MD    Assistant:Maura Carlton MD      Anesthesia:Local MAC    Complications:: None    Blood loss: <5 CC    Specimens: none    Procedure Details:  See Dictation      -D/C home when patient meets anesthesia requirements  -Follow up tomorrow with surgeon in the [facility].

## 2018-07-18 NOTE — ANESTHESIA POSTPROCEDURE EVALUATION
Anesthesia Post Evaluation    Patient: Jose Latham    Procedure(s) Performed: Procedure(s) (LRB):  PHACOEMULSIFICATION, CATARACT (Left)  INSERTION, INTRAOCULAR LENS PROSTHESIS (Left)    Final Anesthesia Type: MAC  Patient location during evaluation: PACU  Patient participation: Yes- Able to Participate  Level of consciousness: awake and alert  Post-procedure vital signs: reviewed and stable  Pain management: adequate  Airway patency: patent  PONV status at discharge: No PONV  Anesthetic complications: no      Cardiovascular status: hemodynamically stable and blood pressure returned to baseline  Respiratory status: unassisted and spontaneous ventilation  Hydration status: euvolemic  Follow-up not needed.        Visit Vitals  BP (!) 148/77   Pulse 68   Temp 36.4 °C (97.5 °F) (Skin)   Resp 16   Ht 6' (1.829 m)   Wt 86.2 kg (190 lb)   SpO2 97%   BMI 25.77 kg/m²       Pain/Michael Score: Pain Assessment Performed: Yes (7/18/2018 10:16 AM)  Presence of Pain: denies (7/18/2018 10:16 AM)  Michael Score: 10 (7/18/2018 10:16 AM)

## 2018-07-18 NOTE — PLAN OF CARE
Discharge instructions reviewed w/ pt and wife, verbalized understanding. Pt in NADN.No complaints at this time. Tolerated liquids w/ no issues. To be d/c'd home w/ family.

## 2018-07-19 ENCOUNTER — OFFICE VISIT (OUTPATIENT)
Dept: OPHTHALMOLOGY | Facility: CLINIC | Age: 83
End: 2018-07-19
Payer: MEDICARE

## 2018-07-19 VITALS — SYSTOLIC BLOOD PRESSURE: 112 MMHG | HEART RATE: 60 BPM | DIASTOLIC BLOOD PRESSURE: 74 MMHG

## 2018-07-19 DIAGNOSIS — Z48.810 POSTOPERATIVE CARE FOR CATARACT: Primary | ICD-10-CM

## 2018-07-19 DIAGNOSIS — H35.3131 EARLY DRY STAGE NONEXUDATIVE AGE-RELATED MACULAR DEGENERATION OF BOTH EYES: ICD-10-CM

## 2018-07-19 DIAGNOSIS — Z96.1 PSEUDOPHAKIA OF LEFT EYE: ICD-10-CM

## 2018-07-19 DIAGNOSIS — Z98.49 POSTOPERATIVE CARE FOR CATARACT: Primary | ICD-10-CM

## 2018-07-19 DIAGNOSIS — Z96.1 PSEUDOPHAKIA, RIGHT EYE: ICD-10-CM

## 2018-07-19 PROCEDURE — 99999 PR PBB SHADOW E&M-EST. PATIENT-LVL II: CPT | Mod: PBBFAC,,, | Performed by: OPHTHALMOLOGY

## 2018-07-19 PROCEDURE — 99024 POSTOP FOLLOW-UP VISIT: CPT | Mod: S$GLB,,, | Performed by: OPHTHALMOLOGY

## 2018-07-19 RX ORDER — PREDNISOLONE ACETATE 10 MG/ML
1 SUSPENSION/ DROPS OPHTHALMIC 4 TIMES DAILY
COMMUNITY
Start: 2018-07-19 | End: 2018-07-31 | Stop reason: SDUPTHER

## 2018-07-19 RX ORDER — MOXIFLOXACIN 5 MG/ML
1 SOLUTION/ DROPS OPHTHALMIC 4 TIMES DAILY
COMMUNITY
Start: 2018-07-19 | End: 2018-07-28

## 2018-07-19 NOTE — PROGRESS NOTES
Assessment /Plan     For exam results, see Encounter Report.    Postoperative care for cataract        HIS WIFE IS A LONG TIME PATIENT OF MINE    From [place]     NS ou   Vis sign ou    od > OS    Denies flomax     2 early dry armd ou    OCT         Phaco/IOL od first - if does ok can do os latter   Trypan blue // brown lens    Phaco / IOL OD Date: 5/16/2018 - trypan blue - PCB00 19.0  POM # 1.5 - phaco/IOL   Doing well -     IOL calc OD - aim for pano to -0.50   PCB00 19.0  AC IOL 15.5    Pt would like to get 2nd eye done   Schedule - complex phaco/IOL OS - dense brown lens - trypan blue - extra viscoat     IOL calc OS   PCB00 19.0  AC IOL 15.5    PLAN       Phaco / IOL OS Date: 7/18  POD # 1 - phaco/IOL OS  Doing well, Va limited by corneal edema  Begin Pred Acetate QID   Begin vigamox QID  Shield at night  Glasses day  No lifting, no bending, no eye rubbing  F/U 1 week, AR/MR ou - leny

## 2018-07-31 ENCOUNTER — OFFICE VISIT (OUTPATIENT)
Dept: OPHTHALMOLOGY | Facility: CLINIC | Age: 83
End: 2018-07-31
Payer: MEDICARE

## 2018-07-31 DIAGNOSIS — H35.3131 EARLY DRY STAGE NONEXUDATIVE AGE-RELATED MACULAR DEGENERATION OF BOTH EYES: ICD-10-CM

## 2018-07-31 DIAGNOSIS — Z96.1 PSEUDOPHAKIA, RIGHT EYE: ICD-10-CM

## 2018-07-31 DIAGNOSIS — Z48.810 POSTOPERATIVE CARE FOR CATARACT: Primary | ICD-10-CM

## 2018-07-31 DIAGNOSIS — Z98.49 POSTOPERATIVE CARE FOR CATARACT: Primary | ICD-10-CM

## 2018-07-31 DIAGNOSIS — Z96.1 PSEUDOPHAKIA OF LEFT EYE: ICD-10-CM

## 2018-07-31 PROCEDURE — 99024 POSTOP FOLLOW-UP VISIT: CPT | Mod: S$GLB,,, | Performed by: OPHTHALMOLOGY

## 2018-07-31 PROCEDURE — 99999 PR PBB SHADOW E&M-EST. PATIENT-LVL II: CPT | Mod: PBBFAC,,, | Performed by: OPHTHALMOLOGY

## 2018-07-31 RX ORDER — PREDNISOLONE ACETATE 10 MG/ML
1 SUSPENSION/ DROPS OPHTHALMIC 3 TIMES DAILY
Qty: 1 BOTTLE | Refills: 0 | Status: SHIPPED | OUTPATIENT
Start: 2018-07-31 | End: 2018-09-11 | Stop reason: ALTCHOICE

## 2018-07-31 NOTE — PROGRESS NOTES
Assessment /Plan     For exam results, see Encounter Report.    Postoperative care for cataract    Pseudophakia of left eye    Pseudophakia, right eye    Early dry stage nonexudative age-related macular degeneration of both eyes            HIS WIFE IS A LONG TIME PATIENT OF MINE    From [place]     NS ou   Vis sign ou    od > OS    Denies flomax     2 early dry armd ou    OCT         Phaco/IOL od first - if does ok can do os latter   Trypan blue // brown lens    Phaco / IOL OD Date: 5/16/2018 - trypan blue - PCB00 19.0  POM # 1.5 - phaco/IOL   Doing well -     IOL calc OD - aim for pano to -0.50   PCB00 19.0  AC IOL 15.5    Pt would like to get 2nd eye done   Schedule - complex phaco/IOL OS - dense brown lens - trypan blue - extra viscoat     IOL calc OS   PCB00 19.0  AC IOL 15.5    PLAN       Phaco / IOL OS Date: 7/18  POW # 2- phaco/IOL OS  Doing well, Va improving, corneal edema resolved  Begin Pred Acetate taper 4/3/2/1   vigamox QID until runs out  F/U 4 - 6  week, AR/MR ou - leny

## 2018-08-23 ENCOUNTER — PES CALL (OUTPATIENT)
Dept: ADMINISTRATIVE | Facility: CLINIC | Age: 83
End: 2018-08-23

## 2018-09-11 ENCOUNTER — OFFICE VISIT (OUTPATIENT)
Dept: OPHTHALMOLOGY | Facility: CLINIC | Age: 83
End: 2018-09-11
Payer: MEDICARE

## 2018-09-11 DIAGNOSIS — Z48.810 POSTOPERATIVE CARE FOR CATARACT: Primary | ICD-10-CM

## 2018-09-11 DIAGNOSIS — Z98.49 POSTOPERATIVE CARE FOR CATARACT: Primary | ICD-10-CM

## 2018-09-11 DIAGNOSIS — Z96.1 PSEUDOPHAKIA, RIGHT EYE: ICD-10-CM

## 2018-09-11 DIAGNOSIS — H35.3131 EARLY DRY STAGE NONEXUDATIVE AGE-RELATED MACULAR DEGENERATION OF BOTH EYES: ICD-10-CM

## 2018-09-11 DIAGNOSIS — Z96.1 PSEUDOPHAKIA OF LEFT EYE: ICD-10-CM

## 2018-09-11 PROCEDURE — 99024 POSTOP FOLLOW-UP VISIT: CPT | Mod: ,,, | Performed by: OPHTHALMOLOGY

## 2018-09-11 PROCEDURE — 99212 OFFICE O/P EST SF 10 MIN: CPT | Mod: PBBFAC,PO | Performed by: OPHTHALMOLOGY

## 2018-09-11 PROCEDURE — 99999 PR PBB SHADOW E&M-EST. PATIENT-LVL II: CPT | Mod: PBBFAC,,, | Performed by: OPHTHALMOLOGY

## 2018-09-11 NOTE — PROGRESS NOTES
HPI     Post-op Evaluation      Additional comments: Pt states eyes are doing well              Comments     S/p Complex Phaco IOL OS 7/18/18  S/p Complex Phaco IOL OD 5/16/18    1. PCIOL OU  2. Early Dry ARMD OU    MEDS:  Finished drops          Last edited by Zandra Sandoval MA on 9/11/2018  1:34 PM. (History)            Assessment /Plan     For exam results, see Encounter Report.    Postoperative care for cataract    Pseudophakia of left eye    Pseudophakia, right eye    Early dry stage nonexudative age-related macular degeneration of both eyes        HIS WIFE IS A LONG TIME PATIENT OF MINE    From [place]     NS ou   Vis sign ou    od > OS    Denies flomax     2 early dry armd ou    OCT             Phaco / IOL OD Date: 5/16/2018 - trypan blue - PCB00 19.0  POM 4 phaco/IOL   Doing well        Phaco / IOL OS Date: 7/18 - PCB00 19.0  POM # 2 - phaco/IOL OS  Doing well, Mrx today        F/U 12 months DFE - back to Dr Dumont - re-consult prn

## 2018-11-08 ENCOUNTER — IMMUNIZATION (OUTPATIENT)
Dept: FAMILY MEDICINE | Facility: CLINIC | Age: 83
End: 2018-11-08
Payer: MEDICARE

## 2018-11-08 DIAGNOSIS — Z23 NEED FOR INFLUENZA VACCINATION: Primary | ICD-10-CM

## 2018-11-08 PROCEDURE — 90662 IIV NO PRSV INCREASED AG IM: CPT | Mod: HCNC,S$GLB,, | Performed by: INTERNAL MEDICINE

## 2018-11-08 PROCEDURE — G0008 ADMIN INFLUENZA VIRUS VAC: HCPCS | Mod: HCNC,S$GLB,, | Performed by: INTERNAL MEDICINE

## 2019-01-24 DIAGNOSIS — I10 ESSENTIAL HYPERTENSION: ICD-10-CM

## 2019-01-24 RX ORDER — HYDROCHLOROTHIAZIDE 25 MG/1
25 TABLET ORAL DAILY
Qty: 90 TABLET | Refills: 3 | Status: SHIPPED | OUTPATIENT
Start: 2019-01-24 | End: 2020-01-22

## 2019-04-05 ENCOUNTER — PES CALL (OUTPATIENT)
Dept: ADMINISTRATIVE | Facility: CLINIC | Age: 84
End: 2019-04-05

## 2019-10-16 ENCOUNTER — CLINICAL SUPPORT (OUTPATIENT)
Dept: FAMILY MEDICINE | Facility: CLINIC | Age: 84
End: 2019-10-16
Payer: MEDICARE

## 2019-10-16 PROCEDURE — 90662 IIV NO PRSV INCREASED AG IM: CPT | Mod: HCNC,S$GLB,, | Performed by: FAMILY MEDICINE

## 2019-10-16 PROCEDURE — G0008 ADMIN INFLUENZA VIRUS VAC: HCPCS | Mod: HCNC,S$GLB,, | Performed by: FAMILY MEDICINE

## 2019-10-16 PROCEDURE — 90662 FLU VACCINE - HIGH DOSE (65+) PRESERVATIVE FREE IM: ICD-10-PCS | Mod: HCNC,S$GLB,, | Performed by: FAMILY MEDICINE

## 2019-10-16 PROCEDURE — G0008 FLU VACCINE - HIGH DOSE (65+) PRESERVATIVE FREE IM: ICD-10-PCS | Mod: HCNC,S$GLB,, | Performed by: FAMILY MEDICINE

## 2019-10-16 NOTE — PROGRESS NOTES
Administered high dose flu.  Patient tolerated well.  Applied band-aid.  For details, see immunization documentation.

## 2019-11-13 ENCOUNTER — PATIENT OUTREACH (OUTPATIENT)
Dept: ADMINISTRATIVE | Facility: OTHER | Age: 84
End: 2019-11-13

## 2020-01-03 ENCOUNTER — OFFICE VISIT (OUTPATIENT)
Dept: FAMILY MEDICINE | Facility: CLINIC | Age: 85
End: 2020-01-03
Payer: MEDICARE

## 2020-01-03 VITALS
SYSTOLIC BLOOD PRESSURE: 120 MMHG | HEART RATE: 69 BPM | OXYGEN SATURATION: 96 % | HEIGHT: 72 IN | DIASTOLIC BLOOD PRESSURE: 80 MMHG | WEIGHT: 186.31 LBS | BODY MASS INDEX: 25.24 KG/M2

## 2020-01-03 DIAGNOSIS — Z00.00 ANNUAL PHYSICAL EXAM: Primary | ICD-10-CM

## 2020-01-03 DIAGNOSIS — I70.0 AORTIC ATHEROSCLEROSIS: ICD-10-CM

## 2020-01-03 DIAGNOSIS — J84.10 CALCIFIED GRANULOMA OF LUNG: ICD-10-CM

## 2020-01-03 DIAGNOSIS — I10 ESSENTIAL HYPERTENSION: ICD-10-CM

## 2020-01-03 PROCEDURE — 3074F PR MOST RECENT SYSTOLIC BLOOD PRESSURE < 130 MM HG: ICD-10-PCS | Mod: HCNC,CPTII,S$GLB, | Performed by: FAMILY MEDICINE

## 2020-01-03 PROCEDURE — 99397 PER PM REEVAL EST PAT 65+ YR: CPT | Mod: HCNC,S$GLB,, | Performed by: FAMILY MEDICINE

## 2020-01-03 PROCEDURE — 3074F SYST BP LT 130 MM HG: CPT | Mod: HCNC,CPTII,S$GLB, | Performed by: FAMILY MEDICINE

## 2020-01-03 PROCEDURE — 3079F DIAST BP 80-89 MM HG: CPT | Mod: HCNC,CPTII,S$GLB, | Performed by: FAMILY MEDICINE

## 2020-01-03 PROCEDURE — 99999 PR PBB SHADOW E&M-EST. PATIENT-LVL III: ICD-10-PCS | Mod: PBBFAC,HCNC,, | Performed by: FAMILY MEDICINE

## 2020-01-03 PROCEDURE — 99499 UNLISTED E&M SERVICE: CPT | Mod: S$GLB,,, | Performed by: FAMILY MEDICINE

## 2020-01-03 PROCEDURE — 99397 PR PREVENTIVE VISIT,EST,65 & OVER: ICD-10-PCS | Mod: HCNC,S$GLB,, | Performed by: FAMILY MEDICINE

## 2020-01-03 PROCEDURE — 99499 RISK ADDL DX/OHS AUDIT: ICD-10-PCS | Mod: S$GLB,,, | Performed by: FAMILY MEDICINE

## 2020-01-03 PROCEDURE — 3079F PR MOST RECENT DIASTOLIC BLOOD PRESSURE 80-89 MM HG: ICD-10-PCS | Mod: HCNC,CPTII,S$GLB, | Performed by: FAMILY MEDICINE

## 2020-01-03 PROCEDURE — 99999 PR PBB SHADOW E&M-EST. PATIENT-LVL III: CPT | Mod: PBBFAC,HCNC,, | Performed by: FAMILY MEDICINE

## 2020-01-03 NOTE — PROGRESS NOTES
Subjective:       Patient ID: Jose Latham is a 85 y.o. male.    Chief Complaint: Annual Exam    85 years old male came to the clinic for his physical examination.  Pressure today stable.  Patient with good compliance with medical regimen.  Patient previous history aortic atherosclerosis and pulmonary granuloma as incidental finding in previous x-rays.  No chest pain, palpitation, orthopnea or PND.    Review of Systems   Constitutional: Negative.    HENT: Negative.    Eyes: Negative.    Respiratory: Negative.    Cardiovascular: Negative.    Gastrointestinal: Negative.    Genitourinary: Negative.    Musculoskeletal: Negative.    Skin: Negative.    Neurological: Negative.    Psychiatric/Behavioral: Negative.        Objective:      Physical Exam   Constitutional: He is oriented to person, place, and time. He appears well-developed and well-nourished. No distress.   HENT:   Head: Normocephalic and atraumatic.   Right Ear: External ear normal.   Left Ear: External ear normal.   Nose: Nose normal.   Mouth/Throat: Oropharynx is clear and moist. No oropharyngeal exudate.   Eyes: Pupils are equal, round, and reactive to light. Conjunctivae and EOM are normal. Right eye exhibits no discharge. Left eye exhibits no discharge. No scleral icterus.   Neck: Normal range of motion. Neck supple. No JVD present. No tracheal deviation present. No thyromegaly present.   Cardiovascular: Normal rate, regular rhythm, normal heart sounds and intact distal pulses. Exam reveals no gallop and no friction rub.   No murmur heard.  Pulmonary/Chest: Effort normal and breath sounds normal. No stridor. No respiratory distress. He has no wheezes. He has no rales. He exhibits no tenderness.   Abdominal: Soft. Bowel sounds are normal. He exhibits no distension and no mass. There is no tenderness. There is no rebound and no guarding.   Musculoskeletal: Normal range of motion. He exhibits no edema or tenderness.   Lymphadenopathy:     He has no  cervical adenopathy.   Neurological: He is alert and oriented to person, place, and time. He has normal reflexes. He displays normal reflexes. No cranial nerve deficit. He exhibits normal muscle tone. Coordination normal.   Skin: Skin is warm and dry. No rash noted. He is not diaphoretic. No erythema. No pallor.   Psychiatric: He has a normal mood and affect. His behavior is normal. Judgment and thought content normal.   Nursing note and vitals reviewed.      Assessment:       1. Annual physical exam    2. Aortic atherosclerosis    3. Calcified granuloma of lung    4. Essential hypertension        Plan:         Jose was seen today for annual exam.    Diagnoses and all orders for this visit:    Annual physical exam  -     Comprehensive metabolic panel; Future  -     Lipid panel; Future  -     Urinalysis; Future  -     TSH; Future  -     CBC auto differential; Future    Aortic atherosclerosis  -     Lipid panel; Future    Calcified granuloma of lung    Essential hypertension  -     Comprehensive metabolic panel; Future  -     Lipid panel; Future  -     Urinalysis; Future  -     TSH; Future  -     CBC auto differential; Future    Continue monitoring blood pressure at home, low sodium diet.

## 2020-01-06 ENCOUNTER — TELEPHONE (OUTPATIENT)
Dept: OPTOMETRY | Facility: CLINIC | Age: 85
End: 2020-01-06

## 2020-01-07 ENCOUNTER — LAB VISIT (OUTPATIENT)
Dept: LAB | Facility: HOSPITAL | Age: 85
End: 2020-01-07
Attending: FAMILY MEDICINE
Payer: MEDICARE

## 2020-01-07 DIAGNOSIS — I10 ESSENTIAL HYPERTENSION: ICD-10-CM

## 2020-01-07 DIAGNOSIS — I70.0 AORTIC ATHEROSCLEROSIS: ICD-10-CM

## 2020-01-07 DIAGNOSIS — Z00.00 ANNUAL PHYSICAL EXAM: ICD-10-CM

## 2020-01-07 LAB
ALBUMIN SERPL BCP-MCNC: 3.7 G/DL (ref 3.5–5.2)
ALP SERPL-CCNC: 57 U/L (ref 55–135)
ALT SERPL W/O P-5'-P-CCNC: 9 U/L (ref 10–44)
ANION GAP SERPL CALC-SCNC: 8 MMOL/L (ref 8–16)
AST SERPL-CCNC: 15 U/L (ref 10–40)
BASOPHILS # BLD AUTO: 0.11 K/UL (ref 0–0.2)
BASOPHILS NFR BLD: 1.8 % (ref 0–1.9)
BILIRUB SERPL-MCNC: 0.9 MG/DL (ref 0.1–1)
BUN SERPL-MCNC: 12 MG/DL (ref 8–23)
CALCIUM SERPL-MCNC: 9.3 MG/DL (ref 8.7–10.5)
CHLORIDE SERPL-SCNC: 95 MMOL/L (ref 95–110)
CHOLEST SERPL-MCNC: 222 MG/DL (ref 120–199)
CHOLEST/HDLC SERPL: 3.8 {RATIO} (ref 2–5)
CO2 SERPL-SCNC: 28 MMOL/L (ref 23–29)
CREAT SERPL-MCNC: 1 MG/DL (ref 0.5–1.4)
DIFFERENTIAL METHOD: ABNORMAL
EOSINOPHIL # BLD AUTO: 0.5 K/UL (ref 0–0.5)
EOSINOPHIL NFR BLD: 7.7 % (ref 0–8)
ERYTHROCYTE [DISTWIDTH] IN BLOOD BY AUTOMATED COUNT: 13.4 % (ref 11.5–14.5)
EST. GFR  (AFRICAN AMERICAN): >60 ML/MIN/1.73 M^2
EST. GFR  (NON AFRICAN AMERICAN): >60 ML/MIN/1.73 M^2
GLUCOSE SERPL-MCNC: 91 MG/DL (ref 70–110)
HCT VFR BLD AUTO: 43.5 % (ref 40–54)
HDLC SERPL-MCNC: 59 MG/DL (ref 40–75)
HDLC SERPL: 26.6 % (ref 20–50)
HGB BLD-MCNC: 13.9 G/DL (ref 14–18)
IMM GRANULOCYTES # BLD AUTO: 0.02 K/UL (ref 0–0.04)
IMM GRANULOCYTES NFR BLD AUTO: 0.3 % (ref 0–0.5)
LDLC SERPL CALC-MCNC: 147.6 MG/DL (ref 63–159)
LYMPHOCYTES # BLD AUTO: 2.2 K/UL (ref 1–4.8)
LYMPHOCYTES NFR BLD: 35.4 % (ref 18–48)
MCH RBC QN AUTO: 28.4 PG (ref 27–31)
MCHC RBC AUTO-ENTMCNC: 32 G/DL (ref 32–36)
MCV RBC AUTO: 89 FL (ref 82–98)
MONOCYTES # BLD AUTO: 0.6 K/UL (ref 0.3–1)
MONOCYTES NFR BLD: 9.4 % (ref 4–15)
NEUTROPHILS # BLD AUTO: 2.9 K/UL (ref 1.8–7.7)
NEUTROPHILS NFR BLD: 45.4 % (ref 38–73)
NONHDLC SERPL-MCNC: 163 MG/DL
NRBC BLD-RTO: 0 /100 WBC
PLATELET # BLD AUTO: 315 K/UL (ref 150–350)
PMV BLD AUTO: 10.1 FL (ref 9.2–12.9)
POTASSIUM SERPL-SCNC: 3.8 MMOL/L (ref 3.5–5.1)
PROT SERPL-MCNC: 6.9 G/DL (ref 6–8.4)
RBC # BLD AUTO: 4.9 M/UL (ref 4.6–6.2)
SODIUM SERPL-SCNC: 131 MMOL/L (ref 136–145)
TRIGL SERPL-MCNC: 77 MG/DL (ref 30–150)
TSH SERPL DL<=0.005 MIU/L-ACNC: 1.17 UIU/ML (ref 0.4–4)
WBC # BLD AUTO: 6.27 K/UL (ref 3.9–12.7)

## 2020-01-07 PROCEDURE — 80061 LIPID PANEL: CPT | Mod: HCNC

## 2020-01-07 PROCEDURE — 36415 COLL VENOUS BLD VENIPUNCTURE: CPT | Mod: HCNC,PO

## 2020-01-07 PROCEDURE — 84443 ASSAY THYROID STIM HORMONE: CPT | Mod: HCNC

## 2020-01-07 PROCEDURE — 80053 COMPREHEN METABOLIC PANEL: CPT | Mod: HCNC

## 2020-01-07 PROCEDURE — 85025 COMPLETE CBC W/AUTO DIFF WBC: CPT | Mod: HCNC

## 2020-01-22 DIAGNOSIS — I10 ESSENTIAL HYPERTENSION: ICD-10-CM

## 2020-01-22 RX ORDER — HYDROCHLOROTHIAZIDE 25 MG/1
25 TABLET ORAL DAILY
Qty: 90 TABLET | Refills: 3 | Status: SHIPPED | OUTPATIENT
Start: 2020-01-22 | End: 2021-01-27

## 2020-01-27 ENCOUNTER — PATIENT OUTREACH (OUTPATIENT)
Dept: ADMINISTRATIVE | Facility: OTHER | Age: 85
End: 2020-01-27

## 2020-01-28 ENCOUNTER — OFFICE VISIT (OUTPATIENT)
Dept: OPTOMETRY | Facility: CLINIC | Age: 85
End: 2020-01-28
Payer: COMMERCIAL

## 2020-01-28 DIAGNOSIS — H35.3111 EARLY DRY STAGE NONEXUDATIVE AGE-RELATED MACULAR DEGENERATION OF RIGHT EYE: ICD-10-CM

## 2020-01-28 DIAGNOSIS — H35.3221 EXUDATIVE AGE-RELATED MACULAR DEGENERATION OF LEFT EYE WITH ACTIVE CHOROIDAL NEOVASCULARIZATION: Primary | ICD-10-CM

## 2020-01-28 DIAGNOSIS — Z96.1 PSEUDOPHAKIA OF BOTH EYES: ICD-10-CM

## 2020-01-28 PROCEDURE — 99499 RISK ADDL DX/OHS AUDIT: ICD-10-PCS | Mod: S$GLB,,, | Performed by: OPTOMETRIST

## 2020-01-28 PROCEDURE — 99999 PR PBB SHADOW E&M-EST. PATIENT-LVL II: ICD-10-PCS | Mod: PBBFAC,,, | Performed by: OPTOMETRIST

## 2020-01-28 PROCEDURE — 92014 COMPRE OPH EXAM EST PT 1/>: CPT | Mod: S$GLB,,, | Performed by: OPTOMETRIST

## 2020-01-28 PROCEDURE — 92014 PR EYE EXAM, EST PATIENT,COMPREHESV: ICD-10-PCS | Mod: S$GLB,,, | Performed by: OPTOMETRIST

## 2020-01-28 PROCEDURE — 99999 PR PBB SHADOW E&M-EST. PATIENT-LVL II: CPT | Mod: PBBFAC,,, | Performed by: OPTOMETRIST

## 2020-01-28 PROCEDURE — 99499 UNLISTED E&M SERVICE: CPT | Mod: S$GLB,,, | Performed by: OPTOMETRIST

## 2020-01-28 NOTE — PROGRESS NOTES
Assessment /Plan     For exam results, see Encounter Report.    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 Blur os x months, Refer to [facility] for eval and treatment.  3. Monitor condition. Patient to report any changes. RTC 1 year recheck.

## 2020-02-11 ENCOUNTER — PATIENT OUTREACH (OUTPATIENT)
Dept: ADMINISTRATIVE | Facility: OTHER | Age: 85
End: 2020-02-11

## 2020-02-13 ENCOUNTER — OFFICE VISIT (OUTPATIENT)
Dept: OPHTHALMOLOGY | Facility: CLINIC | Age: 85
End: 2020-02-13
Payer: MEDICARE

## 2020-02-13 DIAGNOSIS — H43.813 POSTERIOR VITREOUS DETACHMENT, BILATERAL: ICD-10-CM

## 2020-02-13 DIAGNOSIS — H35.3112 NONEXUDATIVE AGE-RELATED MACULAR DEGENERATION, RIGHT EYE, INTERMEDIATE DRY STAGE: ICD-10-CM

## 2020-02-13 DIAGNOSIS — H35.3221 EXUDATIVE AGE-RELATED MACULAR DEGENERATION OF LEFT EYE WITH ACTIVE CHOROIDAL NEOVASCULARIZATION: Primary | ICD-10-CM

## 2020-02-13 PROCEDURE — 67028 INJECTION EYE DRUG: CPT | Mod: HCNC,LT,S$GLB, | Performed by: OPHTHALMOLOGY

## 2020-02-13 PROCEDURE — 92014 COMPRE OPH EXAM EST PT 1/>: CPT | Mod: 25,HCNC,S$GLB, | Performed by: OPHTHALMOLOGY

## 2020-02-13 PROCEDURE — 92134 POSTERIOR SEGMENT OCT RETINA (OCULAR COHERENCE TOMOGRAPHY)-BOTH EYES: ICD-10-PCS | Mod: HCNC,S$GLB,, | Performed by: OPHTHALMOLOGY

## 2020-02-13 PROCEDURE — 99999 PR PBB SHADOW E&M-EST. PATIENT-LVL III: CPT | Mod: PBBFAC,HCNC,, | Performed by: OPHTHALMOLOGY

## 2020-02-13 PROCEDURE — 99999 PR PBB SHADOW E&M-EST. PATIENT-LVL III: ICD-10-PCS | Mod: PBBFAC,HCNC,, | Performed by: OPHTHALMOLOGY

## 2020-02-13 PROCEDURE — 67028 PR INJECT INTRAVITREAL PHARMCOLOGIC: ICD-10-PCS | Mod: HCNC,LT,S$GLB, | Performed by: OPHTHALMOLOGY

## 2020-02-13 PROCEDURE — 92134 CPTRZ OPH DX IMG PST SGM RTA: CPT | Mod: HCNC,S$GLB,, | Performed by: OPHTHALMOLOGY

## 2020-02-13 PROCEDURE — 92014 PR EYE EXAM, EST PATIENT,COMPREHESV: ICD-10-PCS | Mod: 25,HCNC,S$GLB, | Performed by: OPHTHALMOLOGY

## 2020-02-13 RX ADMIN — Medication 1.25 MG: at 09:02

## 2020-02-13 NOTE — LETTER
February 13, 2020      Jonathan Means, OD  [address] [place] [address]           [facility]  [address] [facility] [facility]  [place] [address]  Phone: [phone]  Fax: [phone]          Patient: Jose Latham   MR Number: [id number]   YOB: 1934   Date of Visit: 2/13/2020       Dear Dr. Jonathan Means:    Thank you for referring Jose Latham to me for evaluation. Attached you will find relevant portions of my assessment and plan of care.    If you have questions, please do not hesitate to call me. I look forward to following Jose Latham along with you.    Sincerely,    JIM Langston MD    Enclosure  CC:  No Recipients    If you would like to receive this communication electronically, please contact [email or web address] or [phone] to request more information on [employer] Link access.    For providers and/or their staff who would like to refer a patient to [facility], please contact us through our one-stop-shop provider referral line, [facility], at [phone].    If you feel you have received this communication in error or would no longer like to receive these types of communications, please e-mail [email or web address]

## 2020-02-13 NOTE — PATIENT INSTRUCTIONS

## 2020-02-13 NOTE — PROGRESS NOTES
HPI     Mac Degen consult per Dr. Means    Pt sts noticed spot in va x few weeks ago in OS centrally does not go away   since started noticing it. Can not see much out of OS besides peripheral   va due to spot. OD seems fine.   Denies pain     (-)Flashes (-)Floaters  (-)Photophobia  (-)Glare    No gtts     Last edited by Sarah Flores on 2/13/2020  8:34 AM. (History)        OCT - OD Drusen  OS - SRF with drusen      A/P    1. Wet AMD OS    Avastin OS today    2. Dry AMD OD  [email or web address]    3. PVD OU    4. PCIOL OU      1 month OCT    Risks, benefits, and alternatives to treatment discussed in detail with the patient.  The patient voiced understanding and wished to proceed with the procedure    Injection Procedure Note:  Diagnosis: Wet AMD OS    Patient Identified and Time Out complete  Pt Prefers to be marked with sticker rather than ink marker ([email or web address] #[id number])  Topical Proparacaine and Betadine.  Inject Avastin OS at 6:00 @ 3.5-4mm posterior to limbus  Post Operative Dx: Same  Complications: None  Follow up as above.

## 2020-03-09 ENCOUNTER — PES CALL (OUTPATIENT)
Dept: ADMINISTRATIVE | Facility: CLINIC | Age: 85
End: 2020-03-09

## 2020-04-16 ENCOUNTER — TELEPHONE (OUTPATIENT)
Dept: OPHTHALMOLOGY | Facility: CLINIC | Age: 85
End: 2020-04-16

## 2020-04-16 NOTE — TELEPHONE ENCOUNTER
----- Message from Tamy Michel sent at 4/16/2020 11:55 AM CDT -----  Contact: Pt  Pt calling to reschedule appt cheryl Langston, due to Covid concerns.     Pt home #[phone]

## 2020-04-20 ENCOUNTER — TELEPHONE (OUTPATIENT)
Dept: OPHTHALMOLOGY | Facility: CLINIC | Age: 85
End: 2020-04-20

## 2020-09-29 ENCOUNTER — PATIENT MESSAGE (OUTPATIENT)
Dept: OTHER | Facility: OTHER | Age: 85
End: 2020-09-29

## 2020-10-03 ENCOUNTER — IMMUNIZATION (OUTPATIENT)
Dept: INTERNAL MEDICINE | Facility: CLINIC | Age: 85
End: 2020-10-03
Payer: MEDICARE

## 2020-10-03 PROCEDURE — 99999 PR PBB SHADOW E&M-EST. PATIENT-LVL I: CPT | Mod: PBBFAC,HCNC,,

## 2020-10-03 PROCEDURE — 90694 FLU VACCINE - QUADRIVALENT - ADJUVANTED: ICD-10-PCS | Mod: HCNC,S$GLB,, | Performed by: FAMILY MEDICINE

## 2020-10-03 PROCEDURE — G0008 ADMIN INFLUENZA VIRUS VAC: HCPCS | Mod: HCNC,S$GLB,, | Performed by: FAMILY MEDICINE

## 2020-10-03 PROCEDURE — 90694 VACC AIIV4 NO PRSRV 0.5ML IM: CPT | Mod: HCNC,S$GLB,, | Performed by: FAMILY MEDICINE

## 2020-10-03 PROCEDURE — G0008 FLU VACCINE - QUADRIVALENT - ADJUVANTED: ICD-10-PCS | Mod: HCNC,S$GLB,, | Performed by: FAMILY MEDICINE

## 2020-10-03 PROCEDURE — 99999 PR PBB SHADOW E&M-EST. PATIENT-LVL I: ICD-10-PCS | Mod: PBBFAC,HCNC,,

## 2020-12-11 ENCOUNTER — PATIENT MESSAGE (OUTPATIENT)
Dept: OTHER | Facility: OTHER | Age: 85
End: 2020-12-11

## 2021-01-02 ENCOUNTER — PATIENT MESSAGE (OUTPATIENT)
Dept: FAMILY MEDICINE | Facility: CLINIC | Age: 86
End: 2021-01-02

## 2021-01-05 ENCOUNTER — PATIENT MESSAGE (OUTPATIENT)
Dept: ADMINISTRATIVE | Facility: HOSPITAL | Age: 86
End: 2021-01-05

## 2021-02-11 ENCOUNTER — IMMUNIZATION (OUTPATIENT)
Dept: PHARMACY | Facility: CLINIC | Age: 86
End: 2021-02-11
Payer: MEDICARE

## 2021-02-11 DIAGNOSIS — Z23 NEED FOR VACCINATION: Primary | ICD-10-CM

## 2021-03-11 ENCOUNTER — IMMUNIZATION (OUTPATIENT)
Dept: PHARMACY | Facility: CLINIC | Age: 86
End: 2021-03-11
Payer: MEDICARE

## 2021-03-11 DIAGNOSIS — Z23 NEED FOR VACCINATION: Primary | ICD-10-CM

## 2021-03-22 ENCOUNTER — TELEPHONE (OUTPATIENT)
Dept: OPHTHALMOLOGY | Facility: CLINIC | Age: 86
End: 2021-03-22

## 2021-03-22 ENCOUNTER — PROCEDURE VISIT (OUTPATIENT)
Dept: OPHTHALMOLOGY | Facility: CLINIC | Age: 86
End: 2021-03-22
Payer: MEDICARE

## 2021-03-22 DIAGNOSIS — H35.3112 NONEXUDATIVE AGE-RELATED MACULAR DEGENERATION, RIGHT EYE, INTERMEDIATE DRY STAGE: ICD-10-CM

## 2021-03-22 DIAGNOSIS — H43.813 POSTERIOR VITREOUS DETACHMENT, BILATERAL: ICD-10-CM

## 2021-03-22 DIAGNOSIS — H35.3221 EXUDATIVE AGE-RELATED MACULAR DEGENERATION OF LEFT EYE WITH ACTIVE CHOROIDAL NEOVASCULARIZATION: Primary | ICD-10-CM

## 2021-03-22 PROCEDURE — 99499 UNLISTED E&M SERVICE: CPT | Mod: S$GLB,,, | Performed by: OPHTHALMOLOGY

## 2021-03-22 PROCEDURE — 99499 RISK ADDL DX/OHS AUDIT: ICD-10-PCS | Mod: S$GLB,,, | Performed by: OPHTHALMOLOGY

## 2021-03-22 PROCEDURE — 92014 PR EYE EXAM, EST PATIENT,COMPREHESV: ICD-10-PCS | Mod: 25,S$GLB,, | Performed by: OPHTHALMOLOGY

## 2021-03-22 PROCEDURE — 92134 POSTERIOR SEGMENT OCT RETINA (OCULAR COHERENCE TOMOGRAPHY)-BOTH EYES: ICD-10-PCS | Mod: S$GLB,,, | Performed by: OPHTHALMOLOGY

## 2021-03-22 PROCEDURE — 92014 COMPRE OPH EXAM EST PT 1/>: CPT | Mod: 25,S$GLB,, | Performed by: OPHTHALMOLOGY

## 2021-03-22 PROCEDURE — 67028 INJECTION EYE DRUG: CPT | Mod: LT,S$GLB,, | Performed by: OPHTHALMOLOGY

## 2021-03-22 PROCEDURE — 67028 PR INJECT INTRAVITREAL PHARMCOLOGIC: ICD-10-PCS | Mod: LT,S$GLB,, | Performed by: OPHTHALMOLOGY

## 2021-03-22 PROCEDURE — 92134 CPTRZ OPH DX IMG PST SGM RTA: CPT | Mod: S$GLB,,, | Performed by: OPHTHALMOLOGY

## 2021-03-22 RX ADMIN — Medication 1.25 MG: at 09:03

## 2021-04-05 ENCOUNTER — PATIENT MESSAGE (OUTPATIENT)
Dept: ADMINISTRATIVE | Facility: HOSPITAL | Age: 86
End: 2021-04-05

## 2021-04-20 ENCOUNTER — PATIENT OUTREACH (OUTPATIENT)
Dept: ADMINISTRATIVE | Facility: OTHER | Age: 86
End: 2021-04-20

## 2021-04-29 ENCOUNTER — PROCEDURE VISIT (OUTPATIENT)
Dept: OPHTHALMOLOGY | Facility: CLINIC | Age: 86
End: 2021-04-29
Payer: MEDICARE

## 2021-04-29 DIAGNOSIS — H35.3112 NONEXUDATIVE AGE-RELATED MACULAR DEGENERATION, RIGHT EYE, INTERMEDIATE DRY STAGE: ICD-10-CM

## 2021-04-29 DIAGNOSIS — H35.3221 EXUDATIVE AGE-RELATED MACULAR DEGENERATION OF LEFT EYE WITH ACTIVE CHOROIDAL NEOVASCULARIZATION: Primary | ICD-10-CM

## 2021-04-29 DIAGNOSIS — H43.813 POSTERIOR VITREOUS DETACHMENT, BILATERAL: ICD-10-CM

## 2021-04-29 PROCEDURE — 92012 PR EYE EXAM, EST PATIENT,INTERMED: ICD-10-PCS | Mod: 25,S$GLB,, | Performed by: OPHTHALMOLOGY

## 2021-04-29 PROCEDURE — 67028 PR INJECT INTRAVITREAL PHARMCOLOGIC: ICD-10-PCS | Mod: LT,S$GLB,, | Performed by: OPHTHALMOLOGY

## 2021-04-29 PROCEDURE — 92012 INTRM OPH EXAM EST PATIENT: CPT | Mod: 25,S$GLB,, | Performed by: OPHTHALMOLOGY

## 2021-04-29 PROCEDURE — 92134 CPTRZ OPH DX IMG PST SGM RTA: CPT | Mod: S$GLB,,, | Performed by: OPHTHALMOLOGY

## 2021-04-29 PROCEDURE — 92134 POSTERIOR SEGMENT OCT RETINA (OCULAR COHERENCE TOMOGRAPHY)-BOTH EYES: ICD-10-PCS | Mod: S$GLB,,, | Performed by: OPHTHALMOLOGY

## 2021-04-29 PROCEDURE — 67028 INJECTION EYE DRUG: CPT | Mod: LT,S$GLB,, | Performed by: OPHTHALMOLOGY

## 2021-04-29 RX ADMIN — Medication 1.25 MG: at 08:04

## 2021-05-20 ENCOUNTER — OFFICE VISIT (OUTPATIENT)
Dept: OPHTHALMOLOGY | Facility: CLINIC | Age: 86
End: 2021-05-20
Payer: MEDICARE

## 2021-05-20 DIAGNOSIS — H26.491 PCO (POSTERIOR CAPSULAR OPACIFICATION), RIGHT: Primary | ICD-10-CM

## 2021-05-20 DIAGNOSIS — H35.3221 EXUDATIVE AGE-RELATED MACULAR DEGENERATION OF LEFT EYE WITH ACTIVE CHOROIDAL NEOVASCULARIZATION: ICD-10-CM

## 2021-05-20 DIAGNOSIS — Z96.1 PSEUDOPHAKIA OF BOTH EYES: ICD-10-CM

## 2021-05-20 DIAGNOSIS — H43.813 POSTERIOR VITREOUS DETACHMENT, BILATERAL: ICD-10-CM

## 2021-05-20 DIAGNOSIS — H35.3112 NONEXUDATIVE AGE-RELATED MACULAR DEGENERATION, RIGHT EYE, INTERMEDIATE DRY STAGE: ICD-10-CM

## 2021-05-20 PROCEDURE — 66821 AFTER CATARACT LASER SURGERY: CPT | Mod: RT,S$GLB,, | Performed by: OPHTHALMOLOGY

## 2021-05-20 PROCEDURE — 3288F PR FALLS RISK ASSESSMENT DOCUMENTED: ICD-10-PCS | Mod: CPTII,S$GLB,, | Performed by: OPHTHALMOLOGY

## 2021-05-20 PROCEDURE — 66821 YAG CAPSULOTOMY - OD - RIGHT EYE: ICD-10-PCS | Mod: RT,S$GLB,, | Performed by: OPHTHALMOLOGY

## 2021-05-20 PROCEDURE — 1101F PT FALLS ASSESS-DOCD LE1/YR: CPT | Mod: CPTII,S$GLB,, | Performed by: OPHTHALMOLOGY

## 2021-05-20 PROCEDURE — 99499 NO LOS: ICD-10-PCS | Mod: S$GLB,,, | Performed by: OPHTHALMOLOGY

## 2021-05-20 PROCEDURE — 1126F PR PAIN SEVERITY QUANTIFIED, NO PAIN PRESENT: ICD-10-PCS | Mod: S$GLB,,, | Performed by: OPHTHALMOLOGY

## 2021-05-20 PROCEDURE — 3288F FALL RISK ASSESSMENT DOCD: CPT | Mod: CPTII,S$GLB,, | Performed by: OPHTHALMOLOGY

## 2021-05-20 PROCEDURE — 1101F PR PT FALLS ASSESS DOC 0-1 FALLS W/OUT INJ PAST YR: ICD-10-PCS | Mod: CPTII,S$GLB,, | Performed by: OPHTHALMOLOGY

## 2021-05-20 PROCEDURE — 1126F AMNT PAIN NOTED NONE PRSNT: CPT | Mod: S$GLB,,, | Performed by: OPHTHALMOLOGY

## 2021-05-20 PROCEDURE — 99499 UNLISTED E&M SERVICE: CPT | Mod: S$GLB,,, | Performed by: OPHTHALMOLOGY

## 2021-05-20 PROCEDURE — 99999 PR PBB SHADOW E&M-EST. PATIENT-LVL II: CPT | Mod: PBBFAC,,, | Performed by: OPHTHALMOLOGY

## 2021-05-20 PROCEDURE — 99999 PR PBB SHADOW E&M-EST. PATIENT-LVL II: ICD-10-PCS | Mod: PBBFAC,,, | Performed by: OPHTHALMOLOGY

## 2021-05-26 ENCOUNTER — PATIENT MESSAGE (OUTPATIENT)
Dept: OPHTHALMOLOGY | Facility: CLINIC | Age: 86
End: 2021-05-26

## 2021-06-10 ENCOUNTER — PROCEDURE VISIT (OUTPATIENT)
Dept: OPHTHALMOLOGY | Facility: CLINIC | Age: 86
End: 2021-06-10
Payer: MEDICARE

## 2021-06-10 DIAGNOSIS — H35.3112 NONEXUDATIVE AGE-RELATED MACULAR DEGENERATION, RIGHT EYE, INTERMEDIATE DRY STAGE: ICD-10-CM

## 2021-06-10 DIAGNOSIS — H43.813 POSTERIOR VITREOUS DETACHMENT, BILATERAL: ICD-10-CM

## 2021-06-10 DIAGNOSIS — H35.3221 EXUDATIVE AGE-RELATED MACULAR DEGENERATION OF LEFT EYE WITH ACTIVE CHOROIDAL NEOVASCULARIZATION: Primary | ICD-10-CM

## 2021-06-10 PROCEDURE — 92012 PR EYE EXAM, EST PATIENT,INTERMED: ICD-10-PCS | Mod: 25,S$GLB,, | Performed by: OPHTHALMOLOGY

## 2021-06-10 PROCEDURE — 92134 POSTERIOR SEGMENT OCT RETINA (OCULAR COHERENCE TOMOGRAPHY)-BOTH EYES: ICD-10-PCS | Mod: S$GLB,,, | Performed by: OPHTHALMOLOGY

## 2021-06-10 PROCEDURE — 92134 CPTRZ OPH DX IMG PST SGM RTA: CPT | Mod: S$GLB,,, | Performed by: OPHTHALMOLOGY

## 2021-06-10 PROCEDURE — 92012 INTRM OPH EXAM EST PATIENT: CPT | Mod: 25,S$GLB,, | Performed by: OPHTHALMOLOGY

## 2021-06-10 PROCEDURE — 67028 INJECTION EYE DRUG: CPT | Mod: 79,LT,S$GLB, | Performed by: OPHTHALMOLOGY

## 2021-06-10 PROCEDURE — 67028 PR INJECT INTRAVITREAL PHARMCOLOGIC: ICD-10-PCS | Mod: 79,LT,S$GLB, | Performed by: OPHTHALMOLOGY

## 2021-06-10 RX ADMIN — Medication 1.25 MG: at 08:06

## 2021-06-15 ENCOUNTER — PES CALL (OUTPATIENT)
Dept: ADMINISTRATIVE | Facility: CLINIC | Age: 86
End: 2021-06-15

## 2021-07-06 ENCOUNTER — PATIENT MESSAGE (OUTPATIENT)
Dept: ADMINISTRATIVE | Facility: HOSPITAL | Age: 86
End: 2021-07-06

## 2021-08-20 ENCOUNTER — TELEPHONE (OUTPATIENT)
Dept: FAMILY MEDICINE | Facility: CLINIC | Age: 86
End: 2021-08-20

## 2021-08-20 DIAGNOSIS — I10 ESSENTIAL HYPERTENSION: Primary | ICD-10-CM

## 2021-09-16 ENCOUNTER — TELEPHONE (OUTPATIENT)
Dept: OPHTHALMOLOGY | Facility: CLINIC | Age: 86
End: 2021-09-16

## 2021-09-21 ENCOUNTER — TELEPHONE (OUTPATIENT)
Dept: OPHTHALMOLOGY | Facility: CLINIC | Age: 86
End: 2021-09-21

## 2021-10-08 ENCOUNTER — OFFICE VISIT (OUTPATIENT)
Dept: OPHTHALMOLOGY | Facility: CLINIC | Age: 86
End: 2021-10-08
Payer: MEDICARE

## 2021-10-08 DIAGNOSIS — H35.3221 EXUDATIVE AGE-RELATED MACULAR DEGENERATION OF LEFT EYE WITH ACTIVE CHOROIDAL NEOVASCULARIZATION: Primary | ICD-10-CM

## 2021-10-08 DIAGNOSIS — H35.3112 NONEXUDATIVE AGE-RELATED MACULAR DEGENERATION, RIGHT EYE, INTERMEDIATE DRY STAGE: ICD-10-CM

## 2021-10-08 DIAGNOSIS — H43.813 POSTERIOR VITREOUS DETACHMENT, BILATERAL: ICD-10-CM

## 2021-10-08 PROCEDURE — 92134 CPTRZ OPH DX IMG PST SGM RTA: CPT | Mod: HCNC,S$GLB,, | Performed by: OPHTHALMOLOGY

## 2021-10-08 PROCEDURE — 1101F PR PT FALLS ASSESS DOC 0-1 FALLS W/OUT INJ PAST YR: ICD-10-PCS | Mod: HCNC,CPTII,S$GLB, | Performed by: OPHTHALMOLOGY

## 2021-10-08 PROCEDURE — 92201 OPSCPY EXTND RTA DRAW UNI/BI: CPT | Mod: HCNC,S$GLB,, | Performed by: OPHTHALMOLOGY

## 2021-10-08 PROCEDURE — 92201 PR OPHTHALMOSCOPY, EXT, W/RET DRAW/SCLERAL DEPR, I&R, UNI/BI: ICD-10-PCS | Mod: HCNC,S$GLB,, | Performed by: OPHTHALMOLOGY

## 2021-10-08 PROCEDURE — 99499 RISK ADDL DX/OHS AUDIT: ICD-10-PCS | Mod: S$GLB,,, | Performed by: OPHTHALMOLOGY

## 2021-10-08 PROCEDURE — 92014 PR EYE EXAM, EST PATIENT,COMPREHESV: ICD-10-PCS | Mod: HCNC,S$GLB,, | Performed by: OPHTHALMOLOGY

## 2021-10-08 PROCEDURE — 1101F PT FALLS ASSESS-DOCD LE1/YR: CPT | Mod: HCNC,CPTII,S$GLB, | Performed by: OPHTHALMOLOGY

## 2021-10-08 PROCEDURE — 1159F MED LIST DOCD IN RCRD: CPT | Mod: HCNC,CPTII,S$GLB, | Performed by: OPHTHALMOLOGY

## 2021-10-08 PROCEDURE — 1159F PR MEDICATION LIST DOCUMENTED IN MEDICAL RECORD: ICD-10-PCS | Mod: HCNC,CPTII,S$GLB, | Performed by: OPHTHALMOLOGY

## 2021-10-08 PROCEDURE — 2023F PR DILATED RETINAL EXAM W/O EVID OF RETINOPATHY: ICD-10-PCS | Mod: HCNC,CPTII,S$GLB, | Performed by: OPHTHALMOLOGY

## 2021-10-08 PROCEDURE — 92014 COMPRE OPH EXAM EST PT 1/>: CPT | Mod: HCNC,S$GLB,, | Performed by: OPHTHALMOLOGY

## 2021-10-08 PROCEDURE — 1126F AMNT PAIN NOTED NONE PRSNT: CPT | Mod: HCNC,CPTII,S$GLB, | Performed by: OPHTHALMOLOGY

## 2021-10-08 PROCEDURE — 99999 PR PBB SHADOW E&M-EST. PATIENT-LVL II: ICD-10-PCS | Mod: PBBFAC,HCNC,, | Performed by: OPHTHALMOLOGY

## 2021-10-08 PROCEDURE — 2023F DILAT RTA XM W/O RTNOPTHY: CPT | Mod: HCNC,CPTII,S$GLB, | Performed by: OPHTHALMOLOGY

## 2021-10-08 PROCEDURE — 1160F RVW MEDS BY RX/DR IN RCRD: CPT | Mod: HCNC,CPTII,S$GLB, | Performed by: OPHTHALMOLOGY

## 2021-10-08 PROCEDURE — 3288F FALL RISK ASSESSMENT DOCD: CPT | Mod: HCNC,CPTII,S$GLB, | Performed by: OPHTHALMOLOGY

## 2021-10-08 PROCEDURE — 1126F PR PAIN SEVERITY QUANTIFIED, NO PAIN PRESENT: ICD-10-PCS | Mod: HCNC,CPTII,S$GLB, | Performed by: OPHTHALMOLOGY

## 2021-10-08 PROCEDURE — 1160F PR REVIEW ALL MEDS BY PRESCRIBER/CLIN PHARMACIST DOCUMENTED: ICD-10-PCS | Mod: HCNC,CPTII,S$GLB, | Performed by: OPHTHALMOLOGY

## 2021-10-08 PROCEDURE — 99999 PR PBB SHADOW E&M-EST. PATIENT-LVL II: CPT | Mod: PBBFAC,HCNC,, | Performed by: OPHTHALMOLOGY

## 2021-10-08 PROCEDURE — 92134 POSTERIOR SEGMENT OCT RETINA (OCULAR COHERENCE TOMOGRAPHY)-BOTH EYES: ICD-10-PCS | Mod: HCNC,S$GLB,, | Performed by: OPHTHALMOLOGY

## 2021-10-08 PROCEDURE — 99499 UNLISTED E&M SERVICE: CPT | Mod: S$GLB,,, | Performed by: OPHTHALMOLOGY

## 2021-10-08 PROCEDURE — 3288F PR FALLS RISK ASSESSMENT DOCUMENTED: ICD-10-PCS | Mod: HCNC,CPTII,S$GLB, | Performed by: OPHTHALMOLOGY

## 2021-10-28 ENCOUNTER — OFFICE VISIT (OUTPATIENT)
Dept: OPHTHALMOLOGY | Facility: CLINIC | Age: 86
End: 2021-10-28
Payer: MEDICARE

## 2021-10-28 DIAGNOSIS — Z96.1 PSEUDOPHAKIA OF BOTH EYES: ICD-10-CM

## 2021-10-28 DIAGNOSIS — H35.3221 EXUDATIVE AGE-RELATED MACULAR DEGENERATION OF LEFT EYE WITH ACTIVE CHOROIDAL NEOVASCULARIZATION: ICD-10-CM

## 2021-10-28 DIAGNOSIS — H35.3112 NONEXUDATIVE AGE-RELATED MACULAR DEGENERATION, RIGHT EYE, INTERMEDIATE DRY STAGE: ICD-10-CM

## 2021-10-28 DIAGNOSIS — H26.492 PCO (POSTERIOR CAPSULAR OPACIFICATION), LEFT: Primary | ICD-10-CM

## 2021-10-28 DIAGNOSIS — H43.813 POSTERIOR VITREOUS DETACHMENT, BILATERAL: ICD-10-CM

## 2021-10-28 PROCEDURE — 3288F FALL RISK ASSESSMENT DOCD: CPT | Mod: HCNC,CPTII,S$GLB, | Performed by: OPHTHALMOLOGY

## 2021-10-28 PROCEDURE — 1160F PR REVIEW ALL MEDS BY PRESCRIBER/CLIN PHARMACIST DOCUMENTED: ICD-10-PCS | Mod: HCNC,CPTII,S$GLB, | Performed by: OPHTHALMOLOGY

## 2021-10-28 PROCEDURE — 1126F AMNT PAIN NOTED NONE PRSNT: CPT | Mod: HCNC,CPTII,S$GLB, | Performed by: OPHTHALMOLOGY

## 2021-10-28 PROCEDURE — 66821 YAG CAPSULOTOMY - OS - LEFT EYE: ICD-10-PCS | Mod: HCNC,LT,S$GLB, | Performed by: OPHTHALMOLOGY

## 2021-10-28 PROCEDURE — 99999 PR PBB SHADOW E&M-EST. PATIENT-LVL II: ICD-10-PCS | Mod: PBBFAC,HCNC,, | Performed by: OPHTHALMOLOGY

## 2021-10-28 PROCEDURE — 1101F PR PT FALLS ASSESS DOC 0-1 FALLS W/OUT INJ PAST YR: ICD-10-PCS | Mod: HCNC,CPTII,S$GLB, | Performed by: OPHTHALMOLOGY

## 2021-10-28 PROCEDURE — 1159F MED LIST DOCD IN RCRD: CPT | Mod: HCNC,CPTII,S$GLB, | Performed by: OPHTHALMOLOGY

## 2021-10-28 PROCEDURE — 3288F PR FALLS RISK ASSESSMENT DOCUMENTED: ICD-10-PCS | Mod: HCNC,CPTII,S$GLB, | Performed by: OPHTHALMOLOGY

## 2021-10-28 PROCEDURE — 1159F PR MEDICATION LIST DOCUMENTED IN MEDICAL RECORD: ICD-10-PCS | Mod: HCNC,CPTII,S$GLB, | Performed by: OPHTHALMOLOGY

## 2021-10-28 PROCEDURE — 1160F RVW MEDS BY RX/DR IN RCRD: CPT | Mod: HCNC,CPTII,S$GLB, | Performed by: OPHTHALMOLOGY

## 2021-10-28 PROCEDURE — 66821 AFTER CATARACT LASER SURGERY: CPT | Mod: HCNC,LT,S$GLB, | Performed by: OPHTHALMOLOGY

## 2021-10-28 PROCEDURE — 1101F PT FALLS ASSESS-DOCD LE1/YR: CPT | Mod: HCNC,CPTII,S$GLB, | Performed by: OPHTHALMOLOGY

## 2021-10-28 PROCEDURE — 1126F PR PAIN SEVERITY QUANTIFIED, NO PAIN PRESENT: ICD-10-PCS | Mod: HCNC,CPTII,S$GLB, | Performed by: OPHTHALMOLOGY

## 2021-10-28 PROCEDURE — 92014 COMPRE OPH EXAM EST PT 1/>: CPT | Mod: HCNC,57,S$GLB, | Performed by: OPHTHALMOLOGY

## 2021-10-28 PROCEDURE — 99999 PR PBB SHADOW E&M-EST. PATIENT-LVL II: CPT | Mod: PBBFAC,HCNC,, | Performed by: OPHTHALMOLOGY

## 2021-10-28 PROCEDURE — 92014 PR EYE EXAM, EST PATIENT,COMPREHESV: ICD-10-PCS | Mod: HCNC,57,S$GLB, | Performed by: OPHTHALMOLOGY

## 2021-11-29 ENCOUNTER — IMMUNIZATION (OUTPATIENT)
Dept: INTERNAL MEDICINE | Facility: CLINIC | Age: 86
End: 2021-11-29
Payer: MEDICARE

## 2021-11-29 PROCEDURE — G0008 ADMIN INFLUENZA VIRUS VAC: HCPCS | Mod: HCNC,S$GLB,, | Performed by: FAMILY MEDICINE

## 2021-11-29 PROCEDURE — 90694 VACC AIIV4 NO PRSRV 0.5ML IM: CPT | Mod: HCNC,S$GLB,, | Performed by: FAMILY MEDICINE

## 2021-11-29 PROCEDURE — G0008 FLU VACCINE - QUADRIVALENT - ADJUVANTED: ICD-10-PCS | Mod: HCNC,S$GLB,, | Performed by: FAMILY MEDICINE

## 2021-11-29 PROCEDURE — 90694 FLU VACCINE - QUADRIVALENT - ADJUVANTED: ICD-10-PCS | Mod: HCNC,S$GLB,, | Performed by: FAMILY MEDICINE

## 2022-01-03 ENCOUNTER — IMMUNIZATION (OUTPATIENT)
Dept: PHARMACY | Facility: CLINIC | Age: 87
End: 2022-01-03
Payer: MEDICARE

## 2022-01-03 DIAGNOSIS — Z23 NEED FOR VACCINATION: Primary | ICD-10-CM

## 2022-01-08 DIAGNOSIS — I10 ESSENTIAL HYPERTENSION: ICD-10-CM

## 2022-01-08 NOTE — TELEPHONE ENCOUNTER
No new care gaps identified.  Powered by [employer]. Reference number: [id number].   1/08/2022 12:17:44 AM CST

## 2022-01-10 RX ORDER — HYDROCHLOROTHIAZIDE 25 MG/1
TABLET ORAL
Qty: 90 TABLET | Refills: 3 | Status: SHIPPED | OUTPATIENT
Start: 2022-01-10 | End: 2022-09-26 | Stop reason: ALTCHOICE

## 2022-01-27 ENCOUNTER — OFFICE VISIT (OUTPATIENT)
Dept: INTERNAL MEDICINE | Facility: CLINIC | Age: 87
End: 2022-01-27
Payer: MEDICARE

## 2022-01-27 VITALS
TEMPERATURE: 98 F | HEIGHT: 72 IN | OXYGEN SATURATION: 99 % | DIASTOLIC BLOOD PRESSURE: 78 MMHG | HEART RATE: 65 BPM | SYSTOLIC BLOOD PRESSURE: 128 MMHG | BODY MASS INDEX: 24.07 KG/M2 | WEIGHT: 177.69 LBS

## 2022-01-27 DIAGNOSIS — Z00.00 ROUTINE GENERAL MEDICAL EXAMINATION AT HEALTH CARE FACILITY: Primary | ICD-10-CM

## 2022-01-27 DIAGNOSIS — H35.3221 EXUDATIVE AGE-RELATED MACULAR DEGENERATION OF LEFT EYE WITH ACTIVE CHOROIDAL NEOVASCULARIZATION: ICD-10-CM

## 2022-01-27 DIAGNOSIS — I70.0 AORTIC ATHEROSCLEROSIS: ICD-10-CM

## 2022-01-27 DIAGNOSIS — I10 ESSENTIAL HYPERTENSION: ICD-10-CM

## 2022-01-27 DIAGNOSIS — J84.10 CALCIFIED GRANULOMA OF LUNG: ICD-10-CM

## 2022-01-27 DIAGNOSIS — Z85.820 HISTORY OF MELANOMA: ICD-10-CM

## 2022-01-27 PROCEDURE — 1159F PR MEDICATION LIST DOCUMENTED IN MEDICAL RECORD: ICD-10-PCS | Mod: HCNC,CPTII,S$GLB, | Performed by: INTERNAL MEDICINE

## 2022-01-27 PROCEDURE — 1159F MED LIST DOCD IN RCRD: CPT | Mod: HCNC,CPTII,S$GLB, | Performed by: INTERNAL MEDICINE

## 2022-01-27 PROCEDURE — 99397 PR PREVENTIVE VISIT,EST,65 & OVER: ICD-10-PCS | Mod: HCNC,S$GLB,, | Performed by: INTERNAL MEDICINE

## 2022-01-27 PROCEDURE — 1101F PR PT FALLS ASSESS DOC 0-1 FALLS W/OUT INJ PAST YR: ICD-10-PCS | Mod: HCNC,CPTII,S$GLB, | Performed by: INTERNAL MEDICINE

## 2022-01-27 PROCEDURE — 1160F RVW MEDS BY RX/DR IN RCRD: CPT | Mod: HCNC,CPTII,S$GLB, | Performed by: INTERNAL MEDICINE

## 2022-01-27 PROCEDURE — 1126F AMNT PAIN NOTED NONE PRSNT: CPT | Mod: HCNC,CPTII,S$GLB, | Performed by: INTERNAL MEDICINE

## 2022-01-27 PROCEDURE — 1160F PR REVIEW ALL MEDS BY PRESCRIBER/CLIN PHARMACIST DOCUMENTED: ICD-10-PCS | Mod: HCNC,CPTII,S$GLB, | Performed by: INTERNAL MEDICINE

## 2022-01-27 PROCEDURE — 1101F PT FALLS ASSESS-DOCD LE1/YR: CPT | Mod: HCNC,CPTII,S$GLB, | Performed by: INTERNAL MEDICINE

## 2022-01-27 PROCEDURE — 1126F PR PAIN SEVERITY QUANTIFIED, NO PAIN PRESENT: ICD-10-PCS | Mod: HCNC,CPTII,S$GLB, | Performed by: INTERNAL MEDICINE

## 2022-01-27 PROCEDURE — 99397 PER PM REEVAL EST PAT 65+ YR: CPT | Mod: HCNC,S$GLB,, | Performed by: INTERNAL MEDICINE

## 2022-01-27 PROCEDURE — 3288F FALL RISK ASSESSMENT DOCD: CPT | Mod: HCNC,CPTII,S$GLB, | Performed by: INTERNAL MEDICINE

## 2022-01-27 PROCEDURE — 99499 RISK ADDL DX/OHS AUDIT: ICD-10-PCS | Mod: S$GLB,,, | Performed by: INTERNAL MEDICINE

## 2022-01-27 PROCEDURE — 3288F PR FALLS RISK ASSESSMENT DOCUMENTED: ICD-10-PCS | Mod: HCNC,CPTII,S$GLB, | Performed by: INTERNAL MEDICINE

## 2022-01-27 PROCEDURE — 99999 PR PBB SHADOW E&M-EST. PATIENT-LVL IV: CPT | Mod: PBBFAC,HCNC,, | Performed by: INTERNAL MEDICINE

## 2022-01-27 PROCEDURE — 99999 PR PBB SHADOW E&M-EST. PATIENT-LVL IV: ICD-10-PCS | Mod: PBBFAC,HCNC,, | Performed by: INTERNAL MEDICINE

## 2022-01-27 PROCEDURE — 99499 UNLISTED E&M SERVICE: CPT | Mod: S$GLB,,, | Performed by: INTERNAL MEDICINE

## 2022-01-27 RX ORDER — INFLUENZA VACCINE, ADJUVANTED 15; 15; 15; 15 UG/.5ML; UG/.5ML; UG/.5ML; UG/.5ML
INJECTION, SUSPENSION INTRAMUSCULAR
COMMUNITY
Start: 2021-11-29 | End: 2022-01-27

## 2022-01-27 NOTE — PROGRESS NOTES
Subjective:       Patient ID: Jose Latham is a 87 y.o. male.    Chief Complaint: No chief complaint on file.    HPI Patient is an 87-year-old male presenting today is new patient to me.  He has previously been followed by a doctor in our [place] location.  He recently moved to the [facility] due to his wife passing away.  She had advanced Alzheimer's.    Patient relates a history of hypertension.  He states he has been on hydrochlorothiazide for his blood pressure for some time.  He has done very well with it.  He has not required further medications he has not had any long-term complications of the blood pressure.  He continues taking the medication at this time without adverse effects.    Patient has had a history of calcified granuloma in the lungs.  He states this goes back to many many years ago when he was in the [employer].  To his knowledge disease granulomata number cause any issue he has never had any problems.  The last time he had an x-ray was in 2016 that I can determine in the chart.  We discussed doing a follow-up x-ray today just to make sure there is no changes.    He relates a history of melanoma.  He states he had a melanoma removed from his left leg quite a few years ago.  He has not had routine follow-up with dermatology since.  He is interested in doing that.    Patient is noted on x-rays to have atherosclerotic disease of the aorta.  He does not have any evidence of any other significant peripheral vascular disease.  No claudication.  No chest pain or palpitations.    He is followed by Ophthalmology for macular degeneration.  He is set up to establish care with Torey Lane MD in the near future.    He is living in a father in-law suite and his son's house.  He stays active playing golf as well as he is learning Bengali in Vietnamese    Review of Systems   Constitutional: Negative for fever and unexpected weight change.   HENT: Negative for hearing loss, postnasal drip and rhinorrhea.     Eyes: Negative for pain and visual disturbance.   Respiratory: Negative for cough, shortness of breath and wheezing.    Cardiovascular: Negative for chest pain and palpitations.   Gastrointestinal: Negative for constipation, diarrhea, nausea and vomiting.   Genitourinary: Negative for dysuria and hematuria.   Musculoskeletal: Positive for arthralgias. Negative for back pain, myalgias and neck stiffness.   Skin: Negative for pallor and rash.   Neurological: Negative for seizures, syncope and headaches.   Hematological: Negative for adenopathy.   Psychiatric/Behavioral: Negative for dysphoric mood. The patient is not nervous/anxious.        Objective:   /78   Pulse 65   Temp 98.1 °F (36.7 °C)   Ht 6' (1.829 m)   Wt 80.6 kg (177 lb 11.1 oz)   SpO2 99%   BMI 24.10 kg/m²      Physical Exam  Vitals reviewed.   Constitutional:       General: He is not in acute distress.     Appearance: He is well-developed.   HENT:      Head: Normocephalic and atraumatic.      Right Ear: Tympanic membrane and ear canal normal.      Left Ear: Tympanic membrane and ear canal normal.   Eyes:      Pupils: Pupils are equal, round, and reactive to light.   Neck:      Thyroid: No thyromegaly.      Vascular: No JVD.   Cardiovascular:      Rate and Rhythm: Normal rate and regular rhythm.      Heart sounds: Normal heart sounds. No murmur heard.  No friction rub. No gallop.    Pulmonary:      Effort: Pulmonary effort is normal.      Breath sounds: Normal breath sounds. No wheezing or rales.   Abdominal:      General: Bowel sounds are normal. There is no distension.      Palpations: Abdomen is soft.      Tenderness: There is no abdominal tenderness. There is no guarding or rebound.   Musculoskeletal:         General: Normal range of motion.      Cervical back: Normal range of motion and neck supple.   Lymphadenopathy:      Cervical: No cervical adenopathy.   Skin:     General: Skin is warm and dry.      Findings: No rash.   Neurological:       General: No focal deficit present.      Mental Status: He is alert and oriented to person, place, and time.      Cranial Nerves: No cranial nerve deficit.      Deep Tendon Reflexes: Reflexes are normal and symmetric.   Psychiatric:         Mood and Affect: Mood normal.         Judgment: Judgment normal.             Assessment:       1. Routine general medical examination at health care facility    2. Essential hypertension    3. Calcified granuloma of lung    4. History of melanoma    5. Aortic atherosclerosis    6. Exudative age-related macular degeneration of left eye with active choroidal neovascularization        Plan:   Essential hypertension  Blood pressure is under good control.  We will continue the current regimen.  Will work on regular aerobic exercise and a low salt diet.        Aortic atherosclerosis  Continue bp control, check lipids    Calcified granuloma of lung  Has been stable over time. WIll update cxr to assess stability    Routine general medical examination at health care facility  Comments:  Focus on good health habits, low fat diet, regular exercise, seatbelt use, sunscreen use    Essential hypertension  -     CBC Auto Differential; Future; Expected date: 02/01/2022  -     Comprehensive Metabolic Panel; Future; Expected date: 02/01/2022  -     Lipid Panel; Future; Expected date: 02/01/2022    Calcified granuloma of lung  -     X-Ray Chest PA And Lateral; Future; Expected date: 02/01/2022    History of melanoma  Comments:  Make appt with dermatology for follow up  Orders:  -     Ambulatory referral/consult to Dermatology; Future; Expected date: 02/03/2022    Aortic atherosclerosis    Exudative age-related macular degeneration of left eye with active choroidal neovascularization  Comments:  Establishing with Dr. Lane next month          No follow-ups on file.

## 2022-02-01 ENCOUNTER — HOSPITAL ENCOUNTER (OUTPATIENT)
Dept: RADIOLOGY | Facility: HOSPITAL | Age: 87
Discharge: HOME | End: 2022-02-01
Attending: INTERNAL MEDICINE
Payer: MEDICARE

## 2022-02-01 DIAGNOSIS — J84.10 CALCIFIED GRANULOMA OF LUNG: ICD-10-CM

## 2022-02-01 PROCEDURE — 71046 X-RAY EXAM CHEST 2 VIEWS: CPT | Mod: TC,HCNC,FY,PO

## 2022-02-01 PROCEDURE — 71046 XR CHEST PA AND LATERAL: ICD-10-PCS | Mod: 26,HCNC,, | Performed by: RADIOLOGY

## 2022-02-01 PROCEDURE — 71046 X-RAY EXAM CHEST 2 VIEWS: CPT | Mod: 26,HCNC,, | Performed by: RADIOLOGY

## 2022-03-08 NOTE — PROGRESS NOTES
===============================  Date today is 3/14/2022  Jose Latham is a 87 y.o. male  Last visit [facility]: :Visit date not found   Last visit eye dept. Visit date not found    Corrected distance visual acuity was 20/40 +1 in the right eye and 20/400 in the left eye.  Tonometry     Tonometry (Tonopen, 1:12 PM)       Right Left    Pressure 18 19              Wearing Rx     Wearing Rx       Sphere Cylinder Axis Add    Right -1.25 +1.50 017 +3.00    Left -1.00 +1.50 176 +3.00    Type: PAL              Manifest Refraction     Manifest Refraction       Sphere Cylinder Axis    Right -0.50 Sphere     Left -1.00 +1.50 176              Not recorded       Chief Complaint   Patient presents with    Macular Degeneration     Pt moved to [place]. Switching from [place] to [place].        Problem List Items Addressed This Visit        Eye/Vision problems    Exudative age-related macular degeneration of left eye with active choroidal neovascularization - Primary    Relevant Orders    Posterior Segment OCT Retina-Both eyes (Completed)    Nonexudative age-related macular degeneration, right eye, intermediate dry stage    Relevant Orders    Posterior Segment OCT Retina-Both eyes (Completed)    Posterior vitreous detachment, bilateral    Relevant Orders    Posterior Segment OCT Retina-Both eyes (Completed)      Other Visit Diagnoses     Pseudophakia of both eyes              ________________  3/14/2022 today    Sp serial avgf tx txx to 12 weeks    For subretinal fibrosis    last visit in October     OCT today:     No change in OCT since October- no shot needed  PCIOL post YAG OU  0.55 c/d OU  2-3+ RPE mottling OD  Floaters OD  Will continue to follow every 12 weeks    RTC 12 weeks repeat MOCT  Instructed to call 24/7 for any worsening of vision or symptoms. Check OU daily.   Gave my office and cell phone number.    .      ===============================

## 2022-03-14 ENCOUNTER — OFFICE VISIT (OUTPATIENT)
Dept: OPHTHALMOLOGY | Facility: CLINIC | Age: 87
End: 2022-03-14
Payer: MEDICARE

## 2022-03-14 DIAGNOSIS — H35.3112 NONEXUDATIVE AGE-RELATED MACULAR DEGENERATION, RIGHT EYE, INTERMEDIATE DRY STAGE: ICD-10-CM

## 2022-03-14 DIAGNOSIS — H43.813 POSTERIOR VITREOUS DETACHMENT, BILATERAL: ICD-10-CM

## 2022-03-14 DIAGNOSIS — H35.3221 EXUDATIVE AGE-RELATED MACULAR DEGENERATION OF LEFT EYE WITH ACTIVE CHOROIDAL NEOVASCULARIZATION: Primary | ICD-10-CM

## 2022-03-14 DIAGNOSIS — Z96.1 PSEUDOPHAKIA OF BOTH EYES: ICD-10-CM

## 2022-03-14 PROCEDURE — 1160F PR REVIEW ALL MEDS BY PRESCRIBER/CLIN PHARMACIST DOCUMENTED: ICD-10-PCS | Mod: CPTII,S$GLB,, | Performed by: OPHTHALMOLOGY

## 2022-03-14 PROCEDURE — 99213 OFFICE O/P EST LOW 20 MIN: CPT | Mod: S$GLB,,, | Performed by: OPHTHALMOLOGY

## 2022-03-14 PROCEDURE — 99999 PR PBB SHADOW E&M-EST. PATIENT-LVL II: CPT | Mod: PBBFAC,,, | Performed by: OPHTHALMOLOGY

## 2022-03-14 PROCEDURE — 1159F MED LIST DOCD IN RCRD: CPT | Mod: CPTII,S$GLB,, | Performed by: OPHTHALMOLOGY

## 2022-03-14 PROCEDURE — 99213 PR OFFICE/OUTPT VISIT, EST, LEVL III, 20-29 MIN: ICD-10-PCS | Mod: S$GLB,,, | Performed by: OPHTHALMOLOGY

## 2022-03-14 PROCEDURE — 1160F RVW MEDS BY RX/DR IN RCRD: CPT | Mod: CPTII,S$GLB,, | Performed by: OPHTHALMOLOGY

## 2022-03-14 PROCEDURE — 99999 PR PBB SHADOW E&M-EST. PATIENT-LVL II: ICD-10-PCS | Mod: PBBFAC,,, | Performed by: OPHTHALMOLOGY

## 2022-03-14 PROCEDURE — 1159F PR MEDICATION LIST DOCUMENTED IN MEDICAL RECORD: ICD-10-PCS | Mod: CPTII,S$GLB,, | Performed by: OPHTHALMOLOGY

## 2022-03-14 PROCEDURE — 92134 POSTERIOR SEGMENT OCT RETINA (OCULAR COHERENCE TOMOGRAPHY)-BOTH EYES: ICD-10-PCS | Mod: S$GLB,,, | Performed by: OPHTHALMOLOGY

## 2022-03-14 PROCEDURE — 92134 CPTRZ OPH DX IMG PST SGM RTA: CPT | Mod: S$GLB,,, | Performed by: OPHTHALMOLOGY

## 2022-03-27 ENCOUNTER — HOSPITAL ENCOUNTER (EMERGENCY)
Facility: HOSPITAL | Age: 87
Discharge: HOME | End: 2022-03-27
Attending: EMERGENCY MEDICINE
Payer: MEDICARE

## 2022-03-27 VITALS
HEART RATE: 68 BPM | DIASTOLIC BLOOD PRESSURE: 85 MMHG | BODY MASS INDEX: 23.8 KG/M2 | TEMPERATURE: 99 F | SYSTOLIC BLOOD PRESSURE: 160 MMHG | WEIGHT: 175.5 LBS | OXYGEN SATURATION: 99 % | RESPIRATION RATE: 19 BRPM

## 2022-03-27 DIAGNOSIS — R07.89 RIGHT-SIDED CHEST WALL PAIN: Primary | ICD-10-CM

## 2022-03-27 DIAGNOSIS — R07.9 RIGHT-SIDED CHEST PAIN: ICD-10-CM

## 2022-03-27 LAB
ALBUMIN SERPL BCP-MCNC: 4.1 G/DL (ref 3.5–5.2)
ALP SERPL-CCNC: 58 U/L (ref 55–135)
ALT SERPL W/O P-5'-P-CCNC: 20 U/L (ref 10–44)
ANION GAP SERPL CALC-SCNC: 13 MMOL/L (ref 8–16)
AST SERPL-CCNC: 25 U/L (ref 10–40)
BASOPHILS # BLD AUTO: 0.03 K/UL (ref 0–0.2)
BASOPHILS NFR BLD: 0.3 % (ref 0–1.9)
BILIRUB SERPL-MCNC: 0.8 MG/DL (ref 0.1–1)
BNP SERPL-MCNC: 151 PG/ML (ref 0–99)
BUN SERPL-MCNC: 15 MG/DL (ref 8–23)
CALCIUM SERPL-MCNC: 9.8 MG/DL (ref 8.7–10.5)
CHLORIDE SERPL-SCNC: 91 MMOL/L (ref 95–110)
CO2 SERPL-SCNC: 27 MMOL/L (ref 23–29)
CREAT SERPL-MCNC: 1 MG/DL (ref 0.5–1.4)
DIFFERENTIAL METHOD: ABNORMAL
EOSINOPHIL # BLD AUTO: 0 K/UL (ref 0–0.5)
EOSINOPHIL NFR BLD: 0.3 % (ref 0–8)
ERYTHROCYTE [DISTWIDTH] IN BLOOD BY AUTOMATED COUNT: 13.4 % (ref 11.5–14.5)
EST. GFR  (AFRICAN AMERICAN): >60 ML/MIN/1.73 M^2
EST. GFR  (NON AFRICAN AMERICAN): >60 ML/MIN/1.73 M^2
GLUCOSE SERPL-MCNC: 134 MG/DL (ref 70–110)
HCT VFR BLD AUTO: 42.8 % (ref 40–54)
HGB BLD-MCNC: 14.3 G/DL (ref 14–18)
IMM GRANULOCYTES # BLD AUTO: 0.05 K/UL (ref 0–0.04)
IMM GRANULOCYTES NFR BLD AUTO: 0.4 % (ref 0–0.5)
LYMPHOCYTES # BLD AUTO: 1.1 K/UL (ref 1–4.8)
LYMPHOCYTES NFR BLD: 10 % (ref 18–48)
MCH RBC QN AUTO: 28.5 PG (ref 27–31)
MCHC RBC AUTO-ENTMCNC: 33.4 G/DL (ref 32–36)
MCV RBC AUTO: 85 FL (ref 82–98)
MONOCYTES # BLD AUTO: 0.7 K/UL (ref 0.3–1)
MONOCYTES NFR BLD: 6 % (ref 4–15)
NEUTROPHILS # BLD AUTO: 9.4 K/UL (ref 1.8–7.7)
NEUTROPHILS NFR BLD: 83 % (ref 38–73)
NRBC BLD-RTO: 0 /100 WBC
PLATELET # BLD AUTO: 311 K/UL (ref 150–450)
PMV BLD AUTO: 9.6 FL (ref 9.2–12.9)
POTASSIUM SERPL-SCNC: 3.8 MMOL/L (ref 3.5–5.1)
PROT SERPL-MCNC: 7.6 G/DL (ref 6–8.4)
RBC # BLD AUTO: 5.02 M/UL (ref 4.6–6.2)
SODIUM SERPL-SCNC: 131 MMOL/L (ref 136–145)
TROPONIN I SERPL DL<=0.01 NG/ML-MCNC: 0.01 NG/ML (ref 0–0.03)
WBC # BLD AUTO: 11.37 K/UL (ref 3.9–12.7)

## 2022-03-27 PROCEDURE — 80053 COMPREHEN METABOLIC PANEL: CPT | Performed by: EMERGENCY MEDICINE

## 2022-03-27 PROCEDURE — 25000003 PHARM REV CODE 250: Performed by: EMERGENCY MEDICINE

## 2022-03-27 PROCEDURE — 83880 ASSAY OF NATRIURETIC PEPTIDE: CPT | Performed by: EMERGENCY MEDICINE

## 2022-03-27 PROCEDURE — 93005 ELECTROCARDIOGRAM TRACING: CPT

## 2022-03-27 PROCEDURE — 96374 THER/PROPH/DIAG INJ IV PUSH: CPT

## 2022-03-27 PROCEDURE — 84484 ASSAY OF TROPONIN QUANT: CPT | Performed by: EMERGENCY MEDICINE

## 2022-03-27 PROCEDURE — 93010 EKG 12-LEAD: ICD-10-PCS | Mod: ,,, | Performed by: STUDENT IN AN ORGANIZED HEALTH CARE EDUCATION/TRAINING PROGRAM

## 2022-03-27 PROCEDURE — 93010 ELECTROCARDIOGRAM REPORT: CPT | Mod: ,,, | Performed by: STUDENT IN AN ORGANIZED HEALTH CARE EDUCATION/TRAINING PROGRAM

## 2022-03-27 PROCEDURE — 99285 EMERGENCY DEPT VISIT HI MDM: CPT | Mod: 25

## 2022-03-27 PROCEDURE — 85025 COMPLETE CBC W/AUTO DIFF WBC: CPT | Performed by: EMERGENCY MEDICINE

## 2022-03-27 PROCEDURE — 96375 TX/PRO/DX INJ NEW DRUG ADDON: CPT

## 2022-03-27 PROCEDURE — 63600175 PHARM REV CODE 636 W HCPCS: Performed by: EMERGENCY MEDICINE

## 2022-03-27 RX ORDER — LIDOCAINE HYDROCHLORIDE 20 MG/ML
10 SOLUTION OROPHARYNGEAL ONCE
Status: COMPLETED | OUTPATIENT
Start: 2022-03-27 | End: 2022-03-27

## 2022-03-27 RX ORDER — MAG HYDROX/ALUMINUM HYD/SIMETH 200-200-20
30 SUSPENSION, ORAL (FINAL DOSE FORM) ORAL ONCE
Status: COMPLETED | OUTPATIENT
Start: 2022-03-27 | End: 2022-03-27

## 2022-03-27 RX ORDER — MORPHINE SULFATE 4 MG/ML
4 INJECTION, SOLUTION INTRAMUSCULAR; INTRAVENOUS
Status: COMPLETED | OUTPATIENT
Start: 2022-03-27 | End: 2022-03-27

## 2022-03-27 RX ORDER — ONDANSETRON 2 MG/ML
4 INJECTION INTRAMUSCULAR; INTRAVENOUS
Status: COMPLETED | OUTPATIENT
Start: 2022-03-27 | End: 2022-03-27

## 2022-03-27 RX ORDER — ONDANSETRON 4 MG/1
4 TABLET, FILM COATED ORAL EVERY 6 HOURS PRN
Qty: 12 TABLET | Refills: 0 | OUTPATIENT
Start: 2022-03-27 | End: 2022-09-10

## 2022-03-27 RX ORDER — FAMOTIDINE 10 MG/ML
20 INJECTION INTRAVENOUS
Status: COMPLETED | OUTPATIENT
Start: 2022-03-27 | End: 2022-03-27

## 2022-03-27 RX ORDER — TRAMADOL HYDROCHLORIDE 50 MG/1
50 TABLET ORAL EVERY 6 HOURS PRN
Qty: 12 TABLET | Refills: 0 | Status: SHIPPED | OUTPATIENT
Start: 2022-03-27 | End: 2022-04-06

## 2022-03-27 RX ADMIN — MAGNESIUM HYDROXIDE/ALUMINUM HYDROXICE/SIMETHICONE 30 ML: 120; 1200; 1200 SUSPENSION ORAL at 07:03

## 2022-03-27 RX ADMIN — MORPHINE SULFATE 4 MG: 4 INJECTION INTRAVENOUS at 08:03

## 2022-03-27 RX ADMIN — LIDOCAINE HYDROCHLORIDE 10 ML: 20 SOLUTION ORAL; TOPICAL at 07:03

## 2022-03-27 RX ADMIN — ONDANSETRON 4 MG: 2 INJECTION INTRAMUSCULAR; INTRAVENOUS at 08:03

## 2022-03-27 RX ADMIN — FAMOTIDINE 20 MG: 10 INJECTION, SOLUTION INTRAVENOUS at 07:03

## 2022-03-27 NOTE — ED PROVIDER NOTES
"Encounter Date: 3/27/2022       History     Chief Complaint   Patient presents with    Tendonitis     Pt reports tendonitis of the right chest/epigastric area. Pt states that this is a recurrent problem.      HPI   87-year-old white male with history of "chest wall tendinitis "treated remotely with Ultram presents complaining of right-sided chest and upper abdominal pain since yesterday.  This is a constant pain that is nonradiating.  There is no nausea vomiting fever chills.  There is no diarrhea.  Denies any cough or wheezing currently however he did recently have an upper respiratory infection causing him to cough.  This pain is not worse with movement.  Denies any calf pain or swelling.  No history of PE.    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 OR [address] [place] [address];  Service: Ophthalmology;  Laterality: Left;    MALIGNANT SKIN LESION EXCISION      PHACOEMULSIFICATION OF CATARACT Left 7/18/2018    Procedure: PHACOEMULSIFICATION, CATARACT;  Surgeon: Mari Dobson MD;  Location: [facility] OR [address] [place] [address];  Service: Ophthalmology;  Laterality: Left;    TONSILLECTOMY      YAG CAPSULOTOMY Right 05/20/2021         Family History   Problem Relation Age of Onset    Stroke Sister     Hypertension Sister     Cancer Sister         Stomach    Stomach cancer Sister     Heart disease " Sister     Aneurysm Sister     No Known Problems Mother     Alcohol abuse Father     No Known Problems Maternal Aunt     No Known Problems Maternal Uncle     No Known Problems Paternal Aunt     No Known Problems Paternal Uncle     No Known Problems Maternal Grandmother     No Known Problems Maternal Grandfather     No Known Problems Paternal Grandmother     No Known Problems Paternal Grandfather     No Known Problems Son     Amblyopia Neg Hx     Blindness Neg Hx     Cataracts Neg Hx     Diabetes Neg Hx     Glaucoma Neg Hx     Macular degeneration Neg Hx     Retinal detachment Neg Hx     Strabismus Neg Hx     Thyroid disease Neg Hx      Social History     Tobacco Use    Smoking status: Former Smoker     Packs/day: 1.00     Years: 15.00     Pack years: 15.00     Quit date: 5/15/1964     Years since quittin.9    Smokeless tobacco: Never Used   Substance Use Topics    Alcohol use: Yes     Alcohol/week: 2.0 standard drinks     Types: 2 Cans of beer per week    Drug use: No     Review of Systems   Constitutional: Negative for chills and fever.   HENT: Negative for congestion and sore throat.    Cardiovascular: Positive for chest pain. Negative for palpitations and leg swelling.   Gastrointestinal: Positive for abdominal pain. Negative for diarrhea, nausea and vomiting.   Genitourinary: Negative for dysuria, flank pain and frequency.   All other systems reviewed and are negative.      Physical Exam     Initial Vitals [22 0542]   BP Pulse Resp Temp SpO2   (!) 175/95 70 (!) 21 97.7 °F (36.5 °C) 97 %      MAP       --         Physical Exam  Nursing Notes and Vital Signs Reviewed.  Constitutional: Patient is in no acute distress. Well-developed and well-nourished.  Head: Atraumatic. Normocephalic.  Eyes:  EOM intact.  No scleral icterus.  ENT: Mucous membranes are moist.  Nares clear   Neck:  Full ROM. No JVD.  Cardiovascular: Regular rate. Regular rhythm No murmurs, rubs, or gallops. Distal  pulses are 2+ and symmetric  Pulmonary/Chest: No respiratory distress. Clear to auscultation bilaterally. No wheezing or rales.  Equal chest wall rise bilaterally  Abdominal: Soft and non-distended.  There is no tenderness.  No rebound, guarding, or rigidity. Good bowel sounds.  Genitourinary: No CVA tenderness.  No suprapubic tenderness  Musculoskeletal: Moves all extremities. No obvious deformities.  5 x 5 strength in all extremities   Skin: Warm and dry.  Neurological:  Alert, awake, and appropriate.  Normal speech.  No acute focal neurological deficits are appreciated.  Two through 12 intact bilaterally.  Psychiatric: Normal affect. Good eye contact. Appropriate in content.    ED Course   Procedures  Labs Reviewed   CBC W/ AUTO DIFFERENTIAL - Abnormal; Notable for the following components:       Result Value    Gran # (ANC) 9.4 (*)     Immature Grans (Abs) 0.05 (*)     Gran % 83.0 (*)     Lymph % 10.0 (*)     All other components within normal limits   COMPREHENSIVE METABOLIC PANEL - Abnormal; Notable for the following components:    Sodium 131 (*)     Chloride 91 (*)     Glucose 134 (*)     All other components within normal limits   B-TYPE NATRIURETIC PEPTIDE - Abnormal; Notable for the following components:     (*)     All other components within normal limits   TROPONIN I     Results for orders placed or performed during the hospital encounter of 03/27/22   CBC auto differential   Result Value Ref Range    WBC 11.37 3.90 - 12.70 K/uL    RBC 5.02 4.60 - 6.20 M/uL    Hemoglobin 14.3 14.0 - 18.0 g/dL    Hematocrit 42.8 40.0 - 54.0 %    MCV 85 82 - 98 fL    MCH 28.5 27.0 - 31.0 pg    MCHC 33.4 32.0 - 36.0 g/dL    RDW 13.4 11.5 - 14.5 %    Platelets 311 150 - 450 K/uL    MPV 9.6 9.2 - 12.9 fL    Immature Granulocytes 0.4 0.0 - 0.5 %    Gran # (ANC) 9.4 (H) 1.8 - 7.7 K/uL    Immature Grans (Abs) 0.05 (H) 0.00 - 0.04 K/uL    Lymph # 1.1 1.0 - 4.8 K/uL    Mono # 0.7 0.3 - 1.0 K/uL    Eos # 0.0 0.0 - 0.5 K/uL     Baso # 0.03 0.00 - 0.20 K/uL    nRBC 0 0 /100 WBC    Gran % 83.0 (H) 38.0 - 73.0 %    Lymph % 10.0 (L) 18.0 - 48.0 %    Mono % 6.0 4.0 - 15.0 %    Eosinophil % 0.3 0.0 - 8.0 %    Basophil % 0.3 0.0 - 1.9 %    Differential Method Automated    Comprehensive metabolic panel   Result Value Ref Range    Sodium 131 (L) 136 - 145 mmol/L    Potassium 3.8 3.5 - 5.1 mmol/L    Chloride 91 (L) 95 - 110 mmol/L    CO2 27 23 - 29 mmol/L    Glucose 134 (H) 70 - 110 mg/dL    BUN 15 8 - 23 mg/dL    Creatinine 1.0 0.5 - 1.4 mg/dL    Calcium 9.8 8.7 - 10.5 mg/dL    Total Protein 7.6 6.0 - 8.4 g/dL    Albumin 4.1 3.5 - 5.2 g/dL    Total Bilirubin 0.8 0.1 - 1.0 mg/dL    Alkaline Phosphatase 58 55 - 135 U/L    AST 25 10 - 40 U/L    ALT 20 10 - 44 U/L    Anion Gap 13 8 - 16 mmol/L    eGFR if African American >60 >60 mL/min/1.73 m^2    eGFR if non African American >60 >60 mL/min/1.73 m^2   Brain natriuretic peptide   Result Value Ref Range     (H) 0 - 99 pg/mL   Troponin I   Result Value Ref Range    Troponin I 0.009 0.000 - 0.026 ng/mL       EKG Readings: (Independently Interpreted)   Initial Reading: No STEMI. Rhythm: Normal Sinus Rhythm. Heart Rate: 68. ST Segments: Normal ST Segments. T Waves: Normal. Axis: Normal.       Imaging Results          X-Ray Chest AP Portable (Final result)  Result time 03/27/22 07:37:22    Final result by Juan José Mckeon Jr., MD (03/27/22 07:37:22)                 Impression:      No acute findings or interval change.  Prior granulomatous infection.      Electronically signed by: Juan José Mckeon Jr., MD  Date:    03/27/2022  Time:    07:37             Narrative:    EXAMINATION:  XR CHEST AP PORTABLE    CLINICAL HISTORY:  chest pain;    COMPARISON:  Prior from 02/01/2022.    FINDINGS:  Calcified granuloma within the left upper lobe.  Additional calcified granuloma more inferiorly within the left lower chest.  No confluent airspace opacity.  No pleural fluid or pneumothorax.  Heart size within normal  limits.  No significant bony findings.                                 Medications   aluminum-magnesium hydroxide-simethicone 200-200-20 mg/5 mL suspension 30 mL (30 mLs Oral Given 3/27/22 0733)     And   LIDOcaine HCl 2% oral solution 10 mL (10 mLs Oral Given 3/27/22 0733)   famotidine (PF) injection 20 mg (20 mg Intravenous Given 3/27/22 0734)   morphine injection 4 mg (4 mg Intravenous Given 3/27/22 0830)   ondansetron injection 4 mg (4 mg Intravenous Given 3/27/22 0830)      8:54 AM    Patient is stable nontoxic.  Is nonpleuritic right-sided lower chest wall pain and tenderness on exam.  He has no crepitus.  X-ray is showing no significant findings for EKG is benign.  Troponin is negative.  Patient has similar history with musculoskeletal chest wall pain.  Will treat with Ultram and antiemetics with close follow-up.  Patient verbalized understanding room with a plan care seems reliable.  Safe for discharge my opinion.                    Clinical Impression:   Final diagnoses:  [R07.9] Right-sided chest pain  [R07.89] Right-sided chest wall pain (Primary)          ED Disposition Condition    Discharge Stable        ED Prescriptions     Medication Sig Dispense Start Date End Date Auth. Provider    traMADoL (ULTRAM) 50 mg tablet Take 1 tablet (50 mg total) by mouth every 6 (six) hours as needed for Pain. 12 tablet 3/27/2022 4/6/2022 Jorge L Santana Jr., MD    ondansetron (ZOFRAN) 4 MG tablet Take 1 tablet (4 mg total) by mouth every 6 (six) hours as needed for Nausea. 12 tablet 3/27/2022  Jorge L Santana Jr., MD        Follow-up Information     Follow up With Specialties Details Why Contact Info    Ron Calle MD Internal Medicine   [address] [facility]  [address]  [facility] [address] [phone]             Jorge L Santana Jr., MD  03/27/22 [id number]

## 2022-04-27 ENCOUNTER — IMMUNIZATION (OUTPATIENT)
Dept: PHARMACY | Facility: CLINIC | Age: 87
End: 2022-04-27
Payer: MEDICARE

## 2022-04-27 DIAGNOSIS — Z23 NEED FOR VACCINATION: Primary | ICD-10-CM

## 2022-06-06 ENCOUNTER — OFFICE VISIT (OUTPATIENT)
Dept: OPHTHALMOLOGY | Facility: CLINIC | Age: 87
End: 2022-06-06
Payer: MEDICARE

## 2022-06-06 DIAGNOSIS — Z96.1 PSEUDOPHAKIA OF BOTH EYES: ICD-10-CM

## 2022-06-06 DIAGNOSIS — H35.3112 NONEXUDATIVE AGE-RELATED MACULAR DEGENERATION, RIGHT EYE, INTERMEDIATE DRY STAGE: ICD-10-CM

## 2022-06-06 DIAGNOSIS — H35.3221 EXUDATIVE AGE-RELATED MACULAR DEGENERATION OF LEFT EYE WITH ACTIVE CHOROIDAL NEOVASCULARIZATION: Primary | ICD-10-CM

## 2022-06-06 PROCEDURE — 1160F RVW MEDS BY RX/DR IN RCRD: CPT | Mod: CPTII,S$GLB,, | Performed by: OPHTHALMOLOGY

## 2022-06-06 PROCEDURE — 1160F PR REVIEW ALL MEDS BY PRESCRIBER/CLIN PHARMACIST DOCUMENTED: ICD-10-PCS | Mod: CPTII,S$GLB,, | Performed by: OPHTHALMOLOGY

## 2022-06-06 PROCEDURE — 99999 PR PBB SHADOW E&M-EST. PATIENT-LVL II: CPT | Mod: PBBFAC,,, | Performed by: OPHTHALMOLOGY

## 2022-06-06 PROCEDURE — 99212 OFFICE O/P EST SF 10 MIN: CPT | Mod: S$GLB,,, | Performed by: OPHTHALMOLOGY

## 2022-06-06 PROCEDURE — 92134 POSTERIOR SEGMENT OCT RETINA (OCULAR COHERENCE TOMOGRAPHY)-BOTH EYES: ICD-10-PCS | Mod: S$GLB,,, | Performed by: OPHTHALMOLOGY

## 2022-06-06 PROCEDURE — 99212 PR OFFICE/OUTPT VISIT, EST, LEVL II, 10-19 MIN: ICD-10-PCS | Mod: S$GLB,,, | Performed by: OPHTHALMOLOGY

## 2022-06-06 PROCEDURE — 1159F PR MEDICATION LIST DOCUMENTED IN MEDICAL RECORD: ICD-10-PCS | Mod: CPTII,S$GLB,, | Performed by: OPHTHALMOLOGY

## 2022-06-06 PROCEDURE — 1159F MED LIST DOCD IN RCRD: CPT | Mod: CPTII,S$GLB,, | Performed by: OPHTHALMOLOGY

## 2022-06-06 PROCEDURE — 92134 CPTRZ OPH DX IMG PST SGM RTA: CPT | Mod: S$GLB,,, | Performed by: OPHTHALMOLOGY

## 2022-06-06 PROCEDURE — 99999 PR PBB SHADOW E&M-EST. PATIENT-LVL II: ICD-10-PCS | Mod: PBBFAC,,, | Performed by: OPHTHALMOLOGY

## 2022-06-06 NOTE — PROGRESS NOTES
===============================  Date today is 6/6/2022  Jose Latham is a 87 y.o. male  Last visit [facility]: :3/14/2022   Last visit eye dept. 3/14/2022    Corrected distance visual acuity was 20/30 -2 in the right eye and 20/200 in the left eye.  Tonometry     Tonometry (Applanation, 1:29 PM)       Right Left    Pressure 17 15              Not recorded       Not recorded       Not recorded       Chief Complaint   Patient presents with    Macular Degeneration     HPI     1. Wet AMD OS   S/p Avastin OS x 3 (04/29/2021)   2. Dr y AMD OD   AREDS.AG     3. PVD OU   4. PCIOL OU  5. OPC OS   S/P yag cap done OD - 5/20/2021     Last edited by Shantel Perez on 6/6/2022  1:22 PM. (History)      Problem List Items Addressed This Visit        Eye/Vision problems    Exudative age-related macular degeneration of left eye with active choroidal neovascularization - Primary    Relevant Orders    Posterior Segment OCT Retina-Both eyes (Completed)    Nonexudative age-related macular degeneration, right eye, intermediate dry stage    Relevant Orders    Posterior Segment OCT Retina-Both eyes (Completed)      Other Visit Diagnoses     Pseudophakia of both eyes              ________________  6/6/2022 today    OS SRN  OCT today OD shows collapse of RPED and X'l-iqxxtl-muyledlx better  OS incrementally better- shows more tightening this visit  PCIOL OU  C/d 0.4 OD  Drusen OD  2-3+ SMD OD  Areds    RTC 6 months  Instructed to call 24/7 for any worsening of vision or symptoms. Check OU daily.   Gave my office and cell phone number.    .      ===============================

## 2022-09-10 ENCOUNTER — HOSPITAL ENCOUNTER (EMERGENCY)
Facility: HOSPITAL | Age: 87
Discharge: HOME | End: 2022-09-10
Attending: EMERGENCY MEDICINE
Payer: MEDICARE

## 2022-09-10 VITALS
RESPIRATION RATE: 19 BRPM | TEMPERATURE: 98 F | OXYGEN SATURATION: 98 % | SYSTOLIC BLOOD PRESSURE: 120 MMHG | BODY MASS INDEX: 24.73 KG/M2 | HEIGHT: 72 IN | WEIGHT: 182.56 LBS | HEART RATE: 78 BPM | DIASTOLIC BLOOD PRESSURE: 56 MMHG

## 2022-09-10 DIAGNOSIS — K80.50 BILIARY COLIC: Primary | ICD-10-CM

## 2022-09-10 DIAGNOSIS — R10.11 RIGHT UPPER QUADRANT PAIN: ICD-10-CM

## 2022-09-10 DIAGNOSIS — R07.9 CHEST PAIN: ICD-10-CM

## 2022-09-10 LAB
ALBUMIN SERPL BCP-MCNC: 4.3 G/DL (ref 3.5–5.2)
ALP SERPL-CCNC: 62 U/L (ref 55–135)
ALT SERPL W/O P-5'-P-CCNC: 13 U/L (ref 10–44)
ANION GAP SERPL CALC-SCNC: 13 MMOL/L (ref 8–16)
AST SERPL-CCNC: 18 U/L (ref 10–40)
BASOPHILS # BLD AUTO: 0.05 K/UL (ref 0–0.2)
BASOPHILS NFR BLD: 0.4 % (ref 0–1.9)
BILIRUB SERPL-MCNC: 1 MG/DL (ref 0.1–1)
BNP SERPL-MCNC: 244 PG/ML (ref 0–99)
BUN SERPL-MCNC: 15 MG/DL (ref 8–23)
CALCIUM SERPL-MCNC: 9.6 MG/DL (ref 8.7–10.5)
CHLORIDE SERPL-SCNC: 94 MMOL/L (ref 95–110)
CO2 SERPL-SCNC: 27 MMOL/L (ref 23–29)
CREAT SERPL-MCNC: 1 MG/DL (ref 0.5–1.4)
DIFFERENTIAL METHOD: ABNORMAL
EOSINOPHIL # BLD AUTO: 0 K/UL (ref 0–0.5)
EOSINOPHIL NFR BLD: 0 % (ref 0–8)
ERYTHROCYTE [DISTWIDTH] IN BLOOD BY AUTOMATED COUNT: 13.2 % (ref 11.5–14.5)
EST. GFR  (NO RACE VARIABLE): >60 ML/MIN/1.73 M^2
GLUCOSE SERPL-MCNC: 144 MG/DL (ref 70–110)
HCT VFR BLD AUTO: 44 % (ref 40–54)
HGB BLD-MCNC: 14.2 G/DL (ref 14–18)
IMM GRANULOCYTES # BLD AUTO: 0.05 K/UL (ref 0–0.04)
IMM GRANULOCYTES NFR BLD AUTO: 0.4 % (ref 0–0.5)
LYMPHOCYTES # BLD AUTO: 0.8 K/UL (ref 1–4.8)
LYMPHOCYTES NFR BLD: 5.5 % (ref 18–48)
MCH RBC QN AUTO: 27.8 PG (ref 27–31)
MCHC RBC AUTO-ENTMCNC: 32.3 G/DL (ref 32–36)
MCV RBC AUTO: 86 FL (ref 82–98)
MONOCYTES # BLD AUTO: 0.6 K/UL (ref 0.3–1)
MONOCYTES NFR BLD: 4.1 % (ref 4–15)
NEUTROPHILS # BLD AUTO: 12.3 K/UL (ref 1.8–7.7)
NEUTROPHILS NFR BLD: 89.6 % (ref 38–73)
NRBC BLD-RTO: 0 /100 WBC
PLATELET # BLD AUTO: 328 K/UL (ref 150–450)
PMV BLD AUTO: 9.3 FL (ref 9.2–12.9)
POTASSIUM SERPL-SCNC: 3.6 MMOL/L (ref 3.5–5.1)
PROT SERPL-MCNC: 8.1 G/DL (ref 6–8.4)
RBC # BLD AUTO: 5.11 M/UL (ref 4.6–6.2)
SODIUM SERPL-SCNC: 134 MMOL/L (ref 136–145)
TROPONIN I SERPL DL<=0.01 NG/ML-MCNC: 0.01 NG/ML (ref 0–0.03)
WBC # BLD AUTO: 13.71 K/UL (ref 3.9–12.7)

## 2022-09-10 PROCEDURE — 93010 ELECTROCARDIOGRAM REPORT: CPT | Mod: ,,, | Performed by: INTERNAL MEDICINE

## 2022-09-10 PROCEDURE — 93010 EKG 12-LEAD: ICD-10-PCS | Mod: ,,, | Performed by: INTERNAL MEDICINE

## 2022-09-10 PROCEDURE — 25000003 PHARM REV CODE 250: Performed by: EMERGENCY MEDICINE

## 2022-09-10 PROCEDURE — 99285 EMERGENCY DEPT VISIT HI MDM: CPT | Mod: 25

## 2022-09-10 PROCEDURE — 96375 TX/PRO/DX INJ NEW DRUG ADDON: CPT

## 2022-09-10 PROCEDURE — 63600175 PHARM REV CODE 636 W HCPCS: Performed by: EMERGENCY MEDICINE

## 2022-09-10 PROCEDURE — 80053 COMPREHEN METABOLIC PANEL: CPT | Performed by: EMERGENCY MEDICINE

## 2022-09-10 PROCEDURE — 85025 COMPLETE CBC W/AUTO DIFF WBC: CPT | Performed by: EMERGENCY MEDICINE

## 2022-09-10 PROCEDURE — 84484 ASSAY OF TROPONIN QUANT: CPT | Performed by: EMERGENCY MEDICINE

## 2022-09-10 PROCEDURE — 96374 THER/PROPH/DIAG INJ IV PUSH: CPT

## 2022-09-10 PROCEDURE — 93005 ELECTROCARDIOGRAM TRACING: CPT

## 2022-09-10 PROCEDURE — 83880 ASSAY OF NATRIURETIC PEPTIDE: CPT | Performed by: EMERGENCY MEDICINE

## 2022-09-10 RX ORDER — METHOCARBAMOL 500 MG/1
500 TABLET, FILM COATED ORAL
Status: COMPLETED | OUTPATIENT
Start: 2022-09-10 | End: 2022-09-10

## 2022-09-10 RX ORDER — HYDRALAZINE HYDROCHLORIDE 20 MG/ML
10 INJECTION INTRAMUSCULAR; INTRAVENOUS
Status: COMPLETED | OUTPATIENT
Start: 2022-09-10 | End: 2022-09-10

## 2022-09-10 RX ORDER — ONDANSETRON 4 MG/1
4 TABLET, ORALLY DISINTEGRATING ORAL EVERY 6 HOURS PRN
Qty: 20 TABLET | Refills: 0 | Status: SHIPPED | OUTPATIENT
Start: 2022-09-10 | End: 2023-01-30

## 2022-09-10 RX ORDER — ONDANSETRON 2 MG/ML
4 INJECTION INTRAMUSCULAR; INTRAVENOUS
Status: COMPLETED | OUTPATIENT
Start: 2022-09-10 | End: 2022-09-10

## 2022-09-10 RX ORDER — MORPHINE SULFATE 4 MG/ML
4 INJECTION, SOLUTION INTRAMUSCULAR; INTRAVENOUS
Status: COMPLETED | OUTPATIENT
Start: 2022-09-10 | End: 2022-09-10

## 2022-09-10 RX ORDER — KETOROLAC TROMETHAMINE 30 MG/ML
15 INJECTION, SOLUTION INTRAMUSCULAR; INTRAVENOUS
Status: COMPLETED | OUTPATIENT
Start: 2022-09-10 | End: 2022-09-10

## 2022-09-10 RX ADMIN — HYDRALAZINE HYDROCHLORIDE 10 MG: 20 INJECTION, SOLUTION INTRAMUSCULAR; INTRAVENOUS at 08:09

## 2022-09-10 RX ADMIN — MORPHINE SULFATE 4 MG: 4 INJECTION INTRAVENOUS at 06:09

## 2022-09-10 RX ADMIN — KETOROLAC TROMETHAMINE 15 MG: 30 INJECTION, SOLUTION INTRAMUSCULAR; INTRAVENOUS at 08:09

## 2022-09-10 RX ADMIN — ONDANSETRON 4 MG: 2 INJECTION INTRAMUSCULAR; INTRAVENOUS at 06:09

## 2022-09-10 RX ADMIN — METHOCARBAMOL 500 MG: 500 TABLET ORAL at 08:09

## 2022-09-10 NOTE — ED PROVIDER NOTES
SCRIBE #1 NOTE: I, Maximilian Rodas, am scribing for, and in the presence of, Ludmila Rosas MD. I have scribed the entire note.       History     Chief Complaint   Patient presents with    Chest Pain     Pt CO CP below R breast non-radiating since last night. Pt also reports N/V. Denies SOB. Has not taken am RX meds.     Review of patient's allergies indicates:  No Known Allergies      History of Present Illness     HPI    9/10/2022, 6:29 AM  History obtained from the patient      History of Present Illness: Jose Latham is a 88 y.o. male patient with a PMHx of HTN and melanoma who presents to the Emergency Department for evaluation of CP which onset gradually last night. Pt states CP is to his R lower chest area and started 9:00 PM yesterday. He reports this to be his fourth time with these sxs over the past few years. Pt complains of vomiting that happens every time he has similar CP. Pt still has his gallbladder and states last episode of these sxs occurred in march. Symptoms are constant and moderate in severity. No mitigating or exacerbating factors reported. No additional associated sxs reported. Patient denies any SOB, fever, abd pain, dysuria, blood in stool, and all other sxs at this time. Pt has not taken BP medication yet. No further complaints or concerns at this time.       Arrival mode: Personal vehicle     PCP: Ron Calle MD        Past Medical History:  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    Melanoma     left leg, removed years ago    Nuclear sclerosis - Both Eyes 8/21/2013       Past Surgical History:  Past Surgical History:   Procedure Laterality Date    ADENOIDECTOMY      CATARACT EXTRACTION W/  INTRAOCULAR LENS IMPLANT Right 05/16/2018        CATARACT EXTRACTION W/  INTRAOCULAR LENS IMPLANT Left 07/18/2018        INTRAOCULAR PROSTHESES INSERTION Left 7/18/2018    Procedure:  INSERTION, INTRAOCULAR LENS PROSTHESIS;  Surgeon: Mari Dobson MD;  Location: [facility] OR [address] [place] [address];  Service: Ophthalmology;  Laterality: Left;    MALIGNANT SKIN LESION EXCISION      PHACOEMULSIFICATION OF CATARACT Left 2018    Procedure: PHACOEMULSIFICATION, CATARACT;  Surgeon: Mari Dobson MD;  Location: [facility] OR [address] [place] [address];  Service: Ophthalmology;  Laterality: Left;    TONSILLECTOMY      YAG CAPSULOTOMY Right 2021             Family History:  Family History   Problem Relation Age of Onset    Stroke Sister     Hypertension Sister     Cancer Sister         Stomach    Stomach cancer Sister     Heart disease Sister     Aneurysm Sister     No Known Problems Mother     Alcohol abuse Father     No Known Problems Maternal Aunt     No Known Problems Maternal Uncle     No Known Problems Paternal Aunt     No Known Problems Paternal Uncle     No Known Problems Maternal Grandmother     No Known Problems Maternal Grandfather     No Known Problems Paternal Grandmother     No Known Problems Paternal Grandfather     No Known Problems Son     Amblyopia Neg Hx     Blindness Neg Hx     Cataracts Neg Hx     Diabetes Neg Hx     Glaucoma Neg Hx     Macular degeneration Neg Hx     Retinal detachment Neg Hx     Strabismus Neg Hx     Thyroid disease Neg Hx        Social History:  Social History     Tobacco Use    Smoking status: Former     Packs/day: 1.00     Years: 15.00     Pack years: 15.00     Types: Cigarettes     Quit date: 5/15/1964     Years since quittin.3    Smokeless tobacco: Never   Substance and Sexual Activity    Alcohol use: Yes     Alcohol/week: 2.0 standard drinks     Types: 2 Cans of beer per week    Drug use: No    Sexual activity: Not Currently     Partners: Female        Review of Systems     Review of Systems   Constitutional:  Negative for fever.   HENT:  Negative for sore throat.    Respiratory:  Negative for shortness of breath.    Cardiovascular:  Positive for chest pain (R  lower).   Gastrointestinal:  Negative for abdominal pain, blood in stool and nausea.   Genitourinary:  Negative for dysuria.   Musculoskeletal:  Negative for back pain.   Skin:  Negative for rash.   Neurological:  Negative for weakness.   Hematological:  Does not bruise/bleed easily.   All other systems reviewed and are negative.     Physical Exam     Initial Vitals [09/10/22 0612]   BP Pulse Resp Temp SpO2   (!) 215/99 78 14 98.9 °F (37.2 °C) 96 %      MAP       --          Physical Exam  Nursing Notes and Vital Signs Reviewed.  Constitutional: Patient is in no acute distress. Well-developed and well-nourished.  Head: Atraumatic. Normocephalic.  Eyes: PERRL. EOM intact. Conjunctivae are not pale. No scleral icterus.  ENT: Mucous membranes are moist. Oropharynx is clear and symmetric.    Neck: Supple. Full ROM. No lymphadenopathy.  Cardiovascular: Regular rate. Regular rhythm. No murmurs, rubs, or gallops. Distal pulses are 2+ and symmetric.  Pulmonary/Chest: No respiratory distress. Clear to auscultation bilaterally. No wheezing or rales.  Abdominal: Soft and non-distended.  There is no tenderness.  No rebound, guarding, or rigidity. Good bowel sounds. Negative Mclaughlin's sign.   Genitourinary: No CVA tenderness  Musculoskeletal: Moves all extremities. No obvious deformities. No edema. No calf tenderness.  Skin: Warm and dry.  Neurological:  Alert, awake, and appropriate.  Normal speech.  No acute focal neurological deficits are appreciated.  Psychiatric: Normal affect. Good eye contact. Appropriate in content.     ED Course   Procedures  ED Vital Signs:  Vitals:    09/10/22 0612 09/10/22 0647 09/10/22 0657 09/10/22 0700   BP: (!) 215/99  (!) 191/85 (!) 186/87   Pulse: 78  68 68   Resp: 14 16 18 15   Temp: 98.9 °F (37.2 °C)      TempSrc: Oral      SpO2: 96%  98% 99%   Weight: 82.8 kg (182 lb 8.7 oz)      Height: 6' (1.829 m)       09/10/22 0702 09/10/22 0707 09/10/22 0800 09/10/22 0837   BP: (!) 186/87  (!) 207/90  (!) 180/84   Pulse: 68 68 65 66   Resp: 17  20 18   Temp:       TempSrc:       SpO2: 99%  99% 98%   Weight:       Height:        09/10/22 0910   BP: (!) 162/70   Pulse: 89   Resp: (!) 32   Temp: 97.6 °F (36.4 °C)   TempSrc:    SpO2: 98%   Weight:    Height:        Abnormal Lab Results:  Labs Reviewed   CBC W/ AUTO DIFFERENTIAL - Abnormal; Notable for the following components:       Result Value    WBC 13.71 (*)     Gran # (ANC) 12.3 (*)     Immature Grans (Abs) 0.05 (*)     Lymph # 0.8 (*)     Gran % 89.6 (*)     Lymph % 5.5 (*)     All other components within normal limits   COMPREHENSIVE METABOLIC PANEL - Abnormal; Notable for the following components:    Sodium 134 (*)     Chloride 94 (*)     Glucose 144 (*)     All other components within normal limits   B-TYPE NATRIURETIC PEPTIDE - Abnormal; Notable for the following components:     (*)     All other components within normal limits   TROPONIN I        All Lab Results:  Results for orders placed or performed during the hospital encounter of 09/10/22   CBC auto differential   Result Value Ref Range    WBC 13.71 (H) 3.90 - 12.70 K/uL    RBC 5.11 4.60 - 6.20 M/uL    Hemoglobin 14.2 14.0 - 18.0 g/dL    Hematocrit 44.0 40.0 - 54.0 %    MCV 86 82 - 98 fL    MCH 27.8 27.0 - 31.0 pg    MCHC 32.3 32.0 - 36.0 g/dL    RDW 13.2 11.5 - 14.5 %    Platelets 328 150 - 450 K/uL    MPV 9.3 9.2 - 12.9 fL    Immature Granulocytes 0.4 0.0 - 0.5 %    Gran # (ANC) 12.3 (H) 1.8 - 7.7 K/uL    Immature Grans (Abs) 0.05 (H) 0.00 - 0.04 K/uL    Lymph # 0.8 (L) 1.0 - 4.8 K/uL    Mono # 0.6 0.3 - 1.0 K/uL    Eos # 0.0 0.0 - 0.5 K/uL    Baso # 0.05 0.00 - 0.20 K/uL    nRBC 0 0 /100 WBC    Gran % 89.6 (H) 38.0 - 73.0 %    Lymph % 5.5 (L) 18.0 - 48.0 %    Mono % 4.1 4.0 - 15.0 %    Eosinophil % 0.0 0.0 - 8.0 %    Basophil % 0.4 0.0 - 1.9 %    Differential Method Automated    Comprehensive metabolic panel   Result Value Ref Range    Sodium 134 (L) 136 - 145 mmol/L    Potassium 3.6 3.5 -  5.1 mmol/L    Chloride 94 (L) 95 - 110 mmol/L    CO2 27 23 - 29 mmol/L    Glucose 144 (H) 70 - 110 mg/dL    BUN 15 8 - 23 mg/dL    Creatinine 1.0 0.5 - 1.4 mg/dL    Calcium 9.6 8.7 - 10.5 mg/dL    Total Protein 8.1 6.0 - 8.4 g/dL    Albumin 4.3 3.5 - 5.2 g/dL    Total Bilirubin 1.0 0.1 - 1.0 mg/dL    Alkaline Phosphatase 62 55 - 135 U/L    AST 18 10 - 40 U/L    ALT 13 10 - 44 U/L    Anion Gap 13 8 - 16 mmol/L    eGFR >60 >60 mL/min/1.73 m^2   Troponin I #1   Result Value Ref Range    Troponin I 0.014 0.000 - 0.026 ng/mL   BNP   Result Value Ref Range     (H) 0 - 99 pg/mL         Imaging Results:  Imaging Results              US Abdomen Limited (Final result)  Result time 09/10/22 09:47:49      Final result by Juanjo Esteban III, MD (09/10/22 09:47:49)                   Impression:      No acute sonographic abnormality identified.  Cholelithiasis without acute cholecystitis.      Electronically signed by: Juanjo Esteban MD  Date:    09/10/2022  Time:    09:47               Narrative:    EXAMINATION:  US ABDOMEN LIMITED    CLINICAL HISTORY:  RUQ Pain;    TECHNIQUE:  Limited ultrasound of the right upper quadrant of the abdomen (including pancreas, liver, gallbladder, common bile duct, and spleen) was performed.    FINDINGS:  Liver: Normal in size, measuring 14.3 cm. Homogeneous echotexture. No focal hepatic lesions.    Gallbladder: There are 2 gallstones measuring up to 1.7 cm.  No evidence of acute cholecystitis.  No sonographic Mclaughlin's sign.    Biliary system: No significant bile duct dilatation.    Right kidney: Measures 10.1 cm.  7 cm simple appearing exophytic cyst mid right kidney.  No suspicious mass, hydronephrosis or nephrolithiasis identified.    Pancreas: Obscured by gas.    Vasculature: Normal caliber aorta and IVC. Appropriate hepatopedal flow is seen in the main portal vein.    Miscellaneous: No upper abdominal ascites.                                       X-Ray Chest PA And Lateral (Final  result)  Result time 09/10/22 07:02:02      Final result by Juanjo Esteban III, MD (09/10/22 07:02:02)                   Impression:      No acute disease identified in the chest.      Electronically signed by: Juanjo Esteban MD  Date:    09/10/2022  Time:    07:02               Narrative:    EXAMINATION:  XR CHEST PA AND LATERAL    CLINICAL HISTORY:  Chest Pain;    FINDINGS:  Comparison is made with the most recent prior chest x-ray. The cardiomediastinal silhouette is within normal limits.  Stable aortic atherosclerosis.  Stable calcified granuloma left upper lobe.  The lungs appear clear of active disease.  No acute appearing infiltrate, pleural effusion or pneumothorax identified.  No acute osseous abnormality identified.                                       The EKG was ordered, reviewed, and independently interpreted by the ED provider.  Interpretation time: 6:12  Rate: 65 BPM  Rhythm: normal sinus rhythm  Interpretation: Incomplete right bundle branch block. No STEMI.             The Emergency Provider reviewed the vital signs and test results, which are outlined above.     ED Discussion       10:04 AM: Reassessed pt at this time., stable for discharge home, gallbladder diet, close follow up with PCP and general surgery, reasons to return discussed Discussed with pt all pertinent ED information and results. Discussed pt dx and plan of tx. Gave pt all f/u and return to the ED instructions. All questions and concerns were addressed at this time. Pt expresses understanding of information and instructions, and is comfortable with plan to discharge. Pt is stable for discharge.         Medical Decision Making:   Clinical Tests:   Lab Tests: Ordered and Reviewed  Radiological Study: Ordered and Reviewed  Medical Tests: Ordered and Reviewed         ED Medication(s):  Medications   morphine injection 4 mg (4 mg Intravenous Given 9/10/22 0647)   ondansetron injection 4 mg (4 mg Intravenous Given 9/10/22 0647)    ketorolac injection 15 mg (15 mg Intravenous Given 9/10/22 0833)   hydrALAZINE injection 10 mg (10 mg Intravenous Given 9/10/22 0837)   methocarbamoL tablet 500 mg (500 mg Oral Given 9/10/22 0831)       New Prescriptions    ONDANSETRON (ZOFRAN-ODT) 4 MG TBDL    Take 1 tablet (4 mg total) by mouth every 6 (six) hours as needed.        Follow-up Information       Ron Calle MD. Schedule an appointment as soon as possible for a visit in 2 days.    Specialty: Internal Medicine  Contact information:  [address]  [address]  [place] [address] [phone]               [facility] GENERAL SURGERY. Schedule an appointment as soon as possible for a visit in 2 days.    Specialty: General Surgery  Why: Return to the Emergency Room, If symptoms worsen  Contact information:  [address]  [facility] [address] [phone]                               Scribe Attestation:   Scribe #1: I performed the above scribed service and the documentation accurately describes the services I performed. I attest to the accuracy of the note.     Attending:   Physician Attestation Statement for Scribe #1: I, Ludmila Rosas MD, personally performed the services described in this documentation, as scribed by Maximilian Rodas, in my presence, and it is both accurate and complete.           Clinical Impression       ICD-10-CM ICD-9-CM   1. Biliary colic  K80.50 574.20   2. Chest pain  R07.9 786.50   3. Right upper quadrant pain  R10.11 789.01       Disposition:   Disposition: Discharged  Condition: Stable       Ludmila Rosas MD  09/10/22 2677

## 2022-09-26 ENCOUNTER — OFFICE VISIT (OUTPATIENT)
Dept: INTERNAL MEDICINE | Facility: CLINIC | Age: 87
End: 2022-09-26
Payer: MEDICARE

## 2022-09-26 VITALS
WEIGHT: 179.69 LBS | BODY MASS INDEX: 24.34 KG/M2 | HEART RATE: 68 BPM | OXYGEN SATURATION: 98 % | SYSTOLIC BLOOD PRESSURE: 122 MMHG | DIASTOLIC BLOOD PRESSURE: 70 MMHG | HEIGHT: 72 IN | TEMPERATURE: 98 F

## 2022-09-26 DIAGNOSIS — K80.20 CALCULUS OF GALLBLADDER WITHOUT CHOLECYSTITIS WITHOUT OBSTRUCTION: Primary | ICD-10-CM

## 2022-09-26 DIAGNOSIS — I10 ESSENTIAL HYPERTENSION: ICD-10-CM

## 2022-09-26 PROCEDURE — 99999 PR PBB SHADOW E&M-EST. PATIENT-LVL IV: CPT | Mod: PBBFAC,,, | Performed by: INTERNAL MEDICINE

## 2022-09-26 PROCEDURE — 1159F PR MEDICATION LIST DOCUMENTED IN MEDICAL RECORD: ICD-10-PCS | Mod: CPTII,S$GLB,, | Performed by: INTERNAL MEDICINE

## 2022-09-26 PROCEDURE — 3288F FALL RISK ASSESSMENT DOCD: CPT | Mod: CPTII,S$GLB,, | Performed by: INTERNAL MEDICINE

## 2022-09-26 PROCEDURE — 99999 PR PBB SHADOW E&M-EST. PATIENT-LVL IV: ICD-10-PCS | Mod: PBBFAC,,, | Performed by: INTERNAL MEDICINE

## 2022-09-26 PROCEDURE — 1159F MED LIST DOCD IN RCRD: CPT | Mod: CPTII,S$GLB,, | Performed by: INTERNAL MEDICINE

## 2022-09-26 PROCEDURE — 1126F PR PAIN SEVERITY QUANTIFIED, NO PAIN PRESENT: ICD-10-PCS | Mod: CPTII,S$GLB,, | Performed by: INTERNAL MEDICINE

## 2022-09-26 PROCEDURE — 1126F AMNT PAIN NOTED NONE PRSNT: CPT | Mod: CPTII,S$GLB,, | Performed by: INTERNAL MEDICINE

## 2022-09-26 PROCEDURE — 1101F PT FALLS ASSESS-DOCD LE1/YR: CPT | Mod: CPTII,S$GLB,, | Performed by: INTERNAL MEDICINE

## 2022-09-26 PROCEDURE — 90694 FLU VACCINE - QUADRIVALENT - ADJUVANTED: ICD-10-PCS | Mod: S$GLB,,, | Performed by: INTERNAL MEDICINE

## 2022-09-26 PROCEDURE — 1101F PR PT FALLS ASSESS DOC 0-1 FALLS W/OUT INJ PAST YR: ICD-10-PCS | Mod: CPTII,S$GLB,, | Performed by: INTERNAL MEDICINE

## 2022-09-26 PROCEDURE — 90694 VACC AIIV4 NO PRSRV 0.5ML IM: CPT | Mod: S$GLB,,, | Performed by: INTERNAL MEDICINE

## 2022-09-26 PROCEDURE — 99214 PR OFFICE/OUTPT VISIT, EST, LEVL IV, 30-39 MIN: ICD-10-PCS | Mod: 25,S$GLB,, | Performed by: INTERNAL MEDICINE

## 2022-09-26 PROCEDURE — G0008 ADMIN INFLUENZA VIRUS VAC: HCPCS | Mod: S$GLB,,, | Performed by: INTERNAL MEDICINE

## 2022-09-26 PROCEDURE — 1160F RVW MEDS BY RX/DR IN RCRD: CPT | Mod: CPTII,S$GLB,, | Performed by: INTERNAL MEDICINE

## 2022-09-26 PROCEDURE — 1160F PR REVIEW ALL MEDS BY PRESCRIBER/CLIN PHARMACIST DOCUMENTED: ICD-10-PCS | Mod: CPTII,S$GLB,, | Performed by: INTERNAL MEDICINE

## 2022-09-26 PROCEDURE — 3288F PR FALLS RISK ASSESSMENT DOCUMENTED: ICD-10-PCS | Mod: CPTII,S$GLB,, | Performed by: INTERNAL MEDICINE

## 2022-09-26 PROCEDURE — 99214 OFFICE O/P EST MOD 30 MIN: CPT | Mod: 25,S$GLB,, | Performed by: INTERNAL MEDICINE

## 2022-09-26 PROCEDURE — G0008 FLU VACCINE - QUADRIVALENT - ADJUVANTED: ICD-10-PCS | Mod: S$GLB,,, | Performed by: INTERNAL MEDICINE

## 2022-09-26 RX ORDER — LOSARTAN POTASSIUM 25 MG/1
25 TABLET ORAL DAILY
Qty: 30 TABLET | Refills: 11 | Status: SHIPPED | OUTPATIENT
Start: 2022-09-26 | End: 2023-09-30

## 2022-09-26 NOTE — PROGRESS NOTES
Subjective:       Patient ID: Jose Latham is a 88 y.o. male.    Chief Complaint: Follow-up    Follow-up  Pertinent negatives include no chest pain, coughing, fever, nausea or vomiting.  Patient is an 88-year-old male presenting today following up on recent ER visit.  He is following up on a visit to the ER for some right-sided 1 chest discomfort.  He was diagnosed with cholelithiasis without any evidence of cholecystitis.  He states he has not had any further symptoms since he went to the ED. He wanted to discuss options at this time.  He states he has had 1 prior episode which was similar but it has been quite a while ago.      Patient also has history of hypertension.  His blood pressure is looking good today.  He is taking his medication as prescribed but he would like to see about changing from the diuretic to non diuretic.  He states he is a little frustrated with having to urinate as frequently as he has to.    Review of Systems   Constitutional:  Negative for fever and unexpected weight change.   Respiratory:  Negative for cough, shortness of breath and wheezing.    Cardiovascular:  Negative for chest pain and palpitations.   Gastrointestinal:  Negative for constipation, diarrhea, nausea and vomiting.   Genitourinary:  Negative for dysuria and hematuria.     Objective:   /70   Pulse 68   Temp 97.8 °F (36.6 °C) (Temporal)   Ht 6' (1.829 m)   Wt 81.5 kg (179 lb 10.8 oz)   SpO2 98%   BMI 24.37 kg/m²      Physical Exam  Vitals reviewed.   Constitutional:       Appearance: He is well-developed.   HENT:      Head: Normocephalic and atraumatic.      Right Ear: Tympanic membrane, ear canal and external ear normal.      Left Ear: Tympanic membrane, ear canal and external ear normal.   Eyes:      Pupils: Pupils are equal, round, and reactive to light.   Neck:      Thyroid: No thyromegaly.   Cardiovascular:      Rate and Rhythm: Normal rate and regular rhythm.      Heart sounds: Normal heart sounds. No  murmur heard.    No friction rub. No gallop.   Pulmonary:      Effort: Pulmonary effort is normal.      Breath sounds: Normal breath sounds. No wheezing or rales.   Abdominal:      General: Bowel sounds are normal. There is no distension.      Palpations: Abdomen is soft.      Tenderness: There is no abdominal tenderness.   Musculoskeletal:      Cervical back: Neck supple.   Psychiatric:         Mood and Affect: Mood normal.       No visits with results within 2 Week(s) from this visit.   Latest known visit with results is:   Admission on 09/10/2022, Discharged on 09/10/2022   Component Date Value    WBC 09/10/2022 13.71 (H)     RBC 09/10/2022 5.11     Hemoglobin 09/10/2022 14.2     Hematocrit 09/10/2022 44.0     MCV 09/10/2022 86     MCH 09/10/2022 27.8     MCHC 09/10/2022 32.3     RDW 09/10/2022 13.2     Platelets 09/10/2022 328     MPV 09/10/2022 9.3     Immature Granulocytes 09/10/2022 0.4     Gran # (ANC) 09/10/2022 12.3 (H)     Immature Grans (Abs) 09/10/2022 0.05 (H)     Lymph # 09/10/2022 0.8 (L)     Mono # 09/10/2022 0.6     Eos # 09/10/2022 0.0     Baso # 09/10/2022 0.05     nRBC 09/10/2022 0     Gran % 09/10/2022 89.6 (H)     Lymph % 09/10/2022 5.5 (L)     Mono % 09/10/2022 4.1     Eosinophil % 09/10/2022 0.0     Basophil % 09/10/2022 0.4     Differential Method 09/10/2022 Automated     Sodium 09/10/2022 134 (L)     Potassium 09/10/2022 3.6     Chloride 09/10/2022 94 (L)     CO2 09/10/2022 27     Glucose 09/10/2022 144 (H)     BUN 09/10/2022 15     Creatinine 09/10/2022 1.0     Calcium 09/10/2022 9.6     Total Protein 09/10/2022 8.1     Albumin 09/10/2022 4.3     Total Bilirubin 09/10/2022 1.0     Alkaline Phosphatase 09/10/2022 62     AST 09/10/2022 18     ALT 09/10/2022 13     Anion Gap 09/10/2022 13     eGFR 09/10/2022 >60     Troponin I 09/10/2022 0.014     BNP 09/10/2022 244 (H)        Assessment:       1. Calculus of gallbladder without cholecystitis without obstruction    2. Essential hypertension           Plan:   No problem-specific Assessment & Plan notes found for this encounter.    Calculus of gallbladder without cholecystitis without obstruction  Comments:  Avoid fatty foods,  high fiber diet.  Can consider a trial of ursodiol if occurs again.    Essential hypertension  Comments:  stop hctz due to urinary frequency.  start losartan 25 mg once daily.  Orders:  -     losartan (COZAAR) 25 MG tablet; Take 1 tablet (25 mg total) by mouth once daily.  Dispense: 30 tablet; Refill: 11    We discussed options around his cholelithiasis at this time.  Due to his age and comorbidities we decided to hold off on surgery at this time.  He will work on a little low-fat diet and high-fiber diet.  We talked about possibly trying Ursodiol but as his episodes have been so widespread in time were going to hold off on any medication this juncture as would be hard to tell if it is really effective if he is not having frequent episodes.  We discussed this at length and he is in agreement with the plan.      For the blood pressure went stop his hydrochlorothiazide and start low-dose losartan 25 mg once daily.  He will continue to monitor the blood pressures.      Follow up in about 4 months (around 1/26/2023).

## 2022-11-02 ENCOUNTER — TELEPHONE (OUTPATIENT)
Dept: INTERNAL MEDICINE | Facility: CLINIC | Age: 87
End: 2022-11-02
Payer: MEDICARE

## 2022-11-02 NOTE — TELEPHONE ENCOUNTER
----- Message from Marilia Lui sent at 11/2/2022  2:23 PM CDT -----  Type: Appointment Request      Name of Caller:Pt   When is the first available appointment?02/02/23  Symptoms:right knee   Best Call Back Number:[phone]   Additional Information: Portal Message        Message  Appointment Request From: Jose Latham  With Provider: Ron Calle MD [[facility] Internal Medicine]  Preferred Date Range: 11/10/2022 - 11/30/2022  Preferred Times: Any Time  Reason for visit: KNEE PROBLEMS    Comments:  BAD RIGHT KNEE

## 2022-11-03 ENCOUNTER — TELEPHONE (OUTPATIENT)
Dept: INTERNAL MEDICINE | Facility: CLINIC | Age: 87
End: 2022-11-03
Payer: MEDICARE

## 2022-11-03 NOTE — TELEPHONE ENCOUNTER
----- Message from Wyatt Phillips sent at 11/3/2022 10:44 AM CDT -----  Contact: Patient  Type:  Patient Returning Call    Who Called:Jose Latham   Who Left Message for Patient: Bere   Does the patient know what this is regarding?: appointment   Would the patient rather a call back or a response via [facility]?  Call back   Best Call Back Number: [phone]  Additional Information:

## 2022-11-03 NOTE — TELEPHONE ENCOUNTER
Spoke w/ pt, pt stated he has knee pain and wanted an appt. He was informed that  did not have any appts soon and was offered an appt w/ ALFRED Ceja.     Pt declined appt w/ [facility] and stated he will wait til his 01/30 appt w/ .

## 2022-11-03 NOTE — TELEPHONE ENCOUNTER
----- Message from Carmen Benitez sent at 11/3/2022  9:34 AM CDT -----  Contact: Jose  Type:  Patient Returning Call    Who Called: Jose  Who Left Message for Patient: Bere  Does the patient know what this is regarding?: Appointment  access  Would the patient rather a call back or a response via [facility]? Call back   Best Call Back Number: Please call him at [phone]  Additional Information:

## 2022-11-25 ENCOUNTER — TELEPHONE (OUTPATIENT)
Dept: ADMINISTRATIVE | Facility: HOSPITAL | Age: 87
End: 2022-11-25
Payer: MEDICARE

## 2023-01-01 NOTE — MEDICAL/APP STUDENT
Pt is here annual eye exam.   Cataracts removed about 2 years ago.   Noticing issues with reading and a spot in his vision OS.   No pain, irritation, or other issues.   Not using any drops at home.     POH: early macular degeneration  
Yes

## 2023-01-03 ENCOUNTER — TELEPHONE (OUTPATIENT)
Dept: OPHTHALMOLOGY | Facility: CLINIC | Age: 88
End: 2023-01-03
Payer: MEDICARE

## 2023-01-30 ENCOUNTER — TELEPHONE (OUTPATIENT)
Dept: OPHTHALMOLOGY | Facility: CLINIC | Age: 88
End: 2023-01-30
Payer: MEDICARE

## 2023-01-30 ENCOUNTER — OFFICE VISIT (OUTPATIENT)
Dept: INTERNAL MEDICINE | Facility: CLINIC | Age: 88
End: 2023-01-30
Payer: MEDICARE

## 2023-01-30 ENCOUNTER — LAB VISIT (OUTPATIENT)
Dept: LAB | Facility: HOSPITAL | Age: 88
End: 2023-01-30
Attending: INTERNAL MEDICINE
Payer: MEDICARE

## 2023-01-30 VITALS
WEIGHT: 179.25 LBS | DIASTOLIC BLOOD PRESSURE: 78 MMHG | SYSTOLIC BLOOD PRESSURE: 126 MMHG | HEART RATE: 64 BPM | TEMPERATURE: 98 F | OXYGEN SATURATION: 98 % | BODY MASS INDEX: 24.28 KG/M2 | HEIGHT: 72 IN

## 2023-01-30 DIAGNOSIS — I10 ESSENTIAL HYPERTENSION: ICD-10-CM

## 2023-01-30 DIAGNOSIS — Z00.00 ROUTINE GENERAL MEDICAL EXAMINATION AT HEALTH CARE FACILITY: Primary | ICD-10-CM

## 2023-01-30 DIAGNOSIS — Z85.820 HISTORY OF MELANOMA: ICD-10-CM

## 2023-01-30 DIAGNOSIS — I70.0 AORTIC ATHEROSCLEROSIS: ICD-10-CM

## 2023-01-30 DIAGNOSIS — H35.3221 EXUDATIVE AGE-RELATED MACULAR DEGENERATION OF LEFT EYE WITH ACTIVE CHOROIDAL NEOVASCULARIZATION: ICD-10-CM

## 2023-01-30 DIAGNOSIS — J84.10 CALCIFIED GRANULOMA OF LUNG: ICD-10-CM

## 2023-01-30 LAB
ALBUMIN SERPL BCP-MCNC: 3.6 G/DL (ref 3.5–5.2)
ALP SERPL-CCNC: 53 U/L (ref 55–135)
ALT SERPL W/O P-5'-P-CCNC: 9 U/L (ref 10–44)
ANION GAP SERPL CALC-SCNC: 6 MMOL/L (ref 8–16)
AST SERPL-CCNC: 15 U/L (ref 10–40)
BASOPHILS # BLD AUTO: 0.1 K/UL (ref 0–0.2)
BASOPHILS NFR BLD: 1.9 % (ref 0–1.9)
BILIRUB SERPL-MCNC: 0.6 MG/DL (ref 0.1–1)
BUN SERPL-MCNC: 16 MG/DL (ref 8–23)
CALCIUM SERPL-MCNC: 9.5 MG/DL (ref 8.7–10.5)
CHLORIDE SERPL-SCNC: 105 MMOL/L (ref 95–110)
CHOLEST SERPL-MCNC: 199 MG/DL (ref 120–199)
CHOLEST/HDLC SERPL: 3.2 {RATIO} (ref 2–5)
CO2 SERPL-SCNC: 28 MMOL/L (ref 23–29)
CREAT SERPL-MCNC: 1 MG/DL (ref 0.5–1.4)
DIFFERENTIAL METHOD: ABNORMAL
EOSINOPHIL # BLD AUTO: 0.4 K/UL (ref 0–0.5)
EOSINOPHIL NFR BLD: 6.5 % (ref 0–8)
ERYTHROCYTE [DISTWIDTH] IN BLOOD BY AUTOMATED COUNT: 13.7 % (ref 11.5–14.5)
EST. GFR  (NO RACE VARIABLE): >60 ML/MIN/1.73 M^2
GLUCOSE SERPL-MCNC: 94 MG/DL (ref 70–110)
HCT VFR BLD AUTO: 43.4 % (ref 40–54)
HDLC SERPL-MCNC: 62 MG/DL (ref 40–75)
HDLC SERPL: 31.2 % (ref 20–50)
HGB BLD-MCNC: 13.6 G/DL (ref 14–18)
IMM GRANULOCYTES # BLD AUTO: 0.02 K/UL (ref 0–0.04)
IMM GRANULOCYTES NFR BLD AUTO: 0.4 % (ref 0–0.5)
LDLC SERPL CALC-MCNC: 125 MG/DL (ref 63–159)
LYMPHOCYTES # BLD AUTO: 1.7 K/UL (ref 1–4.8)
LYMPHOCYTES NFR BLD: 31.3 % (ref 18–48)
MCH RBC QN AUTO: 28.1 PG (ref 27–31)
MCHC RBC AUTO-ENTMCNC: 31.3 G/DL (ref 32–36)
MCV RBC AUTO: 90 FL (ref 82–98)
MONOCYTES # BLD AUTO: 0.5 K/UL (ref 0.3–1)
MONOCYTES NFR BLD: 8.6 % (ref 4–15)
NEUTROPHILS # BLD AUTO: 2.8 K/UL (ref 1.8–7.7)
NEUTROPHILS NFR BLD: 51.3 % (ref 38–73)
NONHDLC SERPL-MCNC: 137 MG/DL
NRBC BLD-RTO: 0 /100 WBC
PLATELET # BLD AUTO: 305 K/UL (ref 150–450)
PMV BLD AUTO: 10.5 FL (ref 9.2–12.9)
POTASSIUM SERPL-SCNC: 4.6 MMOL/L (ref 3.5–5.1)
PROT SERPL-MCNC: 6.6 G/DL (ref 6–8.4)
RBC # BLD AUTO: 4.84 M/UL (ref 4.6–6.2)
SODIUM SERPL-SCNC: 139 MMOL/L (ref 136–145)
TRIGL SERPL-MCNC: 60 MG/DL (ref 30–150)
WBC # BLD AUTO: 5.36 K/UL (ref 3.9–12.7)

## 2023-01-30 PROCEDURE — 3288F FALL RISK ASSESSMENT DOCD: CPT | Mod: HCNC,CPTII,S$GLB, | Performed by: INTERNAL MEDICINE

## 2023-01-30 PROCEDURE — 1160F PR REVIEW ALL MEDS BY PRESCRIBER/CLIN PHARMACIST DOCUMENTED: ICD-10-PCS | Mod: HCNC,CPTII,S$GLB, | Performed by: INTERNAL MEDICINE

## 2023-01-30 PROCEDURE — 99397 PER PM REEVAL EST PAT 65+ YR: CPT | Mod: HCNC,S$GLB,, | Performed by: INTERNAL MEDICINE

## 2023-01-30 PROCEDURE — 1126F PR PAIN SEVERITY QUANTIFIED, NO PAIN PRESENT: ICD-10-PCS | Mod: HCNC,CPTII,S$GLB, | Performed by: INTERNAL MEDICINE

## 2023-01-30 PROCEDURE — 85025 COMPLETE CBC W/AUTO DIFF WBC: CPT | Mod: HCNC | Performed by: INTERNAL MEDICINE

## 2023-01-30 PROCEDURE — 36415 COLL VENOUS BLD VENIPUNCTURE: CPT | Mod: HCNC,PO | Performed by: INTERNAL MEDICINE

## 2023-01-30 PROCEDURE — 1160F RVW MEDS BY RX/DR IN RCRD: CPT | Mod: HCNC,CPTII,S$GLB, | Performed by: INTERNAL MEDICINE

## 2023-01-30 PROCEDURE — 99499 UNLISTED E&M SERVICE: CPT | Mod: HCNC,S$GLB,, | Performed by: INTERNAL MEDICINE

## 2023-01-30 PROCEDURE — 99999 PR PBB SHADOW E&M-EST. PATIENT-LVL IV: CPT | Mod: PBBFAC,HCNC,, | Performed by: INTERNAL MEDICINE

## 2023-01-30 PROCEDURE — 80061 LIPID PANEL: CPT | Mod: HCNC | Performed by: INTERNAL MEDICINE

## 2023-01-30 PROCEDURE — 3288F PR FALLS RISK ASSESSMENT DOCUMENTED: ICD-10-PCS | Mod: HCNC,CPTII,S$GLB, | Performed by: INTERNAL MEDICINE

## 2023-01-30 PROCEDURE — 99397 PR PREVENTIVE VISIT,EST,65 & OVER: ICD-10-PCS | Mod: HCNC,S$GLB,, | Performed by: INTERNAL MEDICINE

## 2023-01-30 PROCEDURE — 1159F MED LIST DOCD IN RCRD: CPT | Mod: HCNC,CPTII,S$GLB, | Performed by: INTERNAL MEDICINE

## 2023-01-30 PROCEDURE — 80053 COMPREHEN METABOLIC PANEL: CPT | Mod: HCNC | Performed by: INTERNAL MEDICINE

## 2023-01-30 PROCEDURE — 1101F PT FALLS ASSESS-DOCD LE1/YR: CPT | Mod: HCNC,CPTII,S$GLB, | Performed by: INTERNAL MEDICINE

## 2023-01-30 PROCEDURE — 99499 RISK ADDL DX/OHS AUDIT: ICD-10-PCS | Mod: HCNC,S$GLB,, | Performed by: INTERNAL MEDICINE

## 2023-01-30 PROCEDURE — 1159F PR MEDICATION LIST DOCUMENTED IN MEDICAL RECORD: ICD-10-PCS | Mod: HCNC,CPTII,S$GLB, | Performed by: INTERNAL MEDICINE

## 2023-01-30 PROCEDURE — 1126F AMNT PAIN NOTED NONE PRSNT: CPT | Mod: HCNC,CPTII,S$GLB, | Performed by: INTERNAL MEDICINE

## 2023-01-30 PROCEDURE — 99999 PR PBB SHADOW E&M-EST. PATIENT-LVL IV: ICD-10-PCS | Mod: PBBFAC,HCNC,, | Performed by: INTERNAL MEDICINE

## 2023-01-30 PROCEDURE — 1101F PR PT FALLS ASSESS DOC 0-1 FALLS W/OUT INJ PAST YR: ICD-10-PCS | Mod: HCNC,CPTII,S$GLB, | Performed by: INTERNAL MEDICINE

## 2023-01-30 NOTE — TELEPHONE ENCOUNTER
Called Ja Yeison at 11:45 this morning to make a follow up appt. With Dr. Lane.  He did not answer, so I left him a detailed voicemail.  kf

## 2023-01-30 NOTE — TELEPHONE ENCOUNTER
----- Message from Lizy Styles sent at 1/30/2023  9:35 AM CST -----  Regarding: Appt    Good morning,     Patient PCP is requesting for the patient to have a follow appointment with Dr. Lane however I'm not showing any available dates. Can someone please review and advise    Thank you

## 2023-01-30 NOTE — PROGRESS NOTES
Subjective:       Patient ID: Jose Latham is a 88 y.o. male.    Chief Complaint: Follow-up     Patient is an 88-year-old male presenting today for updated physical exam review of chronic health issues.  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  Patient indicates he has been doing well.  He is not having any acute issues at this time.  Blood pressure remains under good control.  He continues to follow with his eye doctor on a regular basis.  Imaging of the lung over time as indicated no significant changes on the granulomatous disease.  He is in need of following up with dermatology for his melanoma and general skin care screenings.    Review of Systems   Constitutional:  Negative for fever and unexpected weight change.   HENT:  Negative for hearing loss, postnasal drip and rhinorrhea.    Eyes:  Negative for pain and visual disturbance.   Respiratory:  Negative for cough, shortness of breath and wheezing.    Cardiovascular:  Negative for chest pain and palpitations.   Gastrointestinal:  Negative for constipation, diarrhea, nausea and vomiting.   Genitourinary:  Negative for dysuria and hematuria.   Musculoskeletal:  Positive for arthralgias. Negative for back pain, myalgias and neck stiffness.   Skin:  Negative for pallor and rash.   Neurological:  Negative for seizures, syncope and headaches.   Hematological:  Negative for adenopathy.   Psychiatric/Behavioral:  Negative for dysphoric mood. The patient is not nervous/anxious.      Objective:   /78   Pulse 64   Temp 97.7 °F (36.5 °C) (Oral)   Ht 6' (1.829 m)   Wt 81.3 kg (179 lb 3.7 oz)   SpO2 98%   BMI 24.31 kg/m²      Physical Exam  Vitals reviewed.   Constitutional:       General: He is not in acute distress.     Appearance: He is well-developed.   HENT:      Head: Normocephalic and atraumatic.      Right Ear: Tympanic membrane and ear canal  normal.      Left Ear: Tympanic membrane and ear canal normal.   Eyes:      Pupils: Pupils are equal, round, and reactive to light.   Neck:      Thyroid: No thyromegaly.      Vascular: No JVD.   Cardiovascular:      Rate and Rhythm: Normal rate and regular rhythm.      Heart sounds: Normal heart sounds. No murmur heard.    No friction rub. No gallop.   Pulmonary:      Effort: Pulmonary effort is normal.      Breath sounds: Normal breath sounds. No wheezing or rales.   Abdominal:      General: Bowel sounds are normal. There is no distension.      Palpations: Abdomen is soft.      Tenderness: There is no abdominal tenderness. There is no guarding or rebound.   Musculoskeletal:         General: Normal range of motion.      Cervical back: Normal range of motion and neck supple.   Lymphadenopathy:      Cervical: No cervical adenopathy.   Skin:     General: Skin is warm and dry.      Findings: No rash.   Neurological:      General: No focal deficit present.      Mental Status: He is alert and oriented to person, place, and time.      Cranial Nerves: No cranial nerve deficit.      Deep Tendon Reflexes: Reflexes are normal and symmetric.   Psychiatric:         Mood and Affect: Mood normal.         Judgment: Judgment normal.       No visits with results within 2 Week(s) from this visit.   Latest known visit with results is:   Admission on 09/10/2022, Discharged on 09/10/2022   Component Date Value    WBC 09/10/2022 13.71 (H)     RBC 09/10/2022 5.11     Hemoglobin 09/10/2022 14.2     Hematocrit 09/10/2022 44.0     MCV 09/10/2022 86     MCH 09/10/2022 27.8     MCHC 09/10/2022 32.3     RDW 09/10/2022 13.2     Platelets 09/10/2022 328     MPV 09/10/2022 9.3     Immature Granulocytes 09/10/2022 0.4     Gran # (ANC) 09/10/2022 12.3 (H)     Immature Grans (Abs) 09/10/2022 0.05 (H)     Lymph # 09/10/2022 0.8 (L)     Mono # 09/10/2022 0.6     Eos # 09/10/2022 0.0     Baso # 09/10/2022 0.05     nRBC 09/10/2022 0     Gran % 09/10/2022  89.6 (H)     Lymph % 09/10/2022 5.5 (L)     Mono % 09/10/2022 4.1     Eosinophil % 09/10/2022 0.0     Basophil % 09/10/2022 0.4     Differential Method 09/10/2022 Automated     Sodium 09/10/2022 134 (L)     Potassium 09/10/2022 3.6     Chloride 09/10/2022 94 (L)     CO2 09/10/2022 27     Glucose 09/10/2022 144 (H)     BUN 09/10/2022 15     Creatinine 09/10/2022 1.0     Calcium 09/10/2022 9.6     Total Protein 09/10/2022 8.1     Albumin 09/10/2022 4.3     Total Bilirubin 09/10/2022 1.0     Alkaline Phosphatase 09/10/2022 62     AST 09/10/2022 18     ALT 09/10/2022 13     Anion Gap 09/10/2022 13     eGFR 09/10/2022 >60     Troponin I 09/10/2022 0.014     BNP 09/10/2022 244 (H)        Assessment:       1. Routine general medical examination at health care facility    2. Essential hypertension    3. Exudative age-related macular degeneration of left eye with active choroidal neovascularization    4. Aortic atherosclerosis    5. Calcified granuloma of lung    6. History of melanoma          Plan:   No problem-specific Assessment & Plan notes found for this encounter.    Routine general medical examination at [facility] care facility  Comments:  Focus on good health habits, low fat diet, regular exercise, seatbelt use, sunscreen usse    Essential hypertension  Comments:  stable, no changes today  Orders:  -     CBC Auto Differential; Future; Expected date: 01/30/2023  -     Comprehensive Metabolic Panel; Future; Expected date: 01/30/2023  -     Lipid Panel; Future; Expected date: 01/30/2023    Exudative age-related macular degeneration of left eye with active choroidal neovascularization  Comments:  Continue following with ophthalmology    Aortic atherosclerosis  Comments:  continue bp control.  no other intervention necessary    Calcified granuloma of lung  Comments:  stable on imaging. continue yearly imaging.    History of melanoma  -     Ambulatory referral/consult to Dermatology; Future; Expected date:  02/06/2023          Follow up in about 6 months (around 7/30/2023) for HTN, with Brenna Dugan

## 2023-02-09 DIAGNOSIS — Z00.00 ENCOUNTER FOR MEDICARE ANNUAL WELLNESS EXAM: ICD-10-CM

## 2023-03-10 ENCOUNTER — OFFICE VISIT (OUTPATIENT)
Dept: OPHTHALMOLOGY | Facility: CLINIC | Age: 88
End: 2023-03-10
Payer: MEDICARE

## 2023-03-10 DIAGNOSIS — H35.3112 NONEXUDATIVE AGE-RELATED MACULAR DEGENERATION, RIGHT EYE, INTERMEDIATE DRY STAGE: Primary | ICD-10-CM

## 2023-03-10 DIAGNOSIS — H35.3222 EXUDATIVE AGE-RELATED MACULAR DEGENERATION OF LEFT EYE WITH INACTIVE CHOROIDAL NEOVASCULARIZATION: ICD-10-CM

## 2023-03-10 PROCEDURE — 1126F AMNT PAIN NOTED NONE PRSNT: CPT | Mod: HCNC,CPTII,S$GLB, | Performed by: OPHTHALMOLOGY

## 2023-03-10 PROCEDURE — 99999 PR PBB SHADOW E&M-EST. PATIENT-LVL II: CPT | Mod: PBBFAC,HCNC,, | Performed by: OPHTHALMOLOGY

## 2023-03-10 PROCEDURE — 92014 PR EYE EXAM, EST PATIENT,COMPREHESV: ICD-10-PCS | Mod: HCNC,S$GLB,, | Performed by: OPHTHALMOLOGY

## 2023-03-10 PROCEDURE — 92134 POSTERIOR SEGMENT OCT RETINA (OCULAR COHERENCE TOMOGRAPHY)-BOTH EYES: ICD-10-PCS | Mod: HCNC,S$GLB,, | Performed by: OPHTHALMOLOGY

## 2023-03-10 PROCEDURE — 1160F RVW MEDS BY RX/DR IN RCRD: CPT | Mod: HCNC,CPTII,S$GLB, | Performed by: OPHTHALMOLOGY

## 2023-03-10 PROCEDURE — 1159F MED LIST DOCD IN RCRD: CPT | Mod: HCNC,CPTII,S$GLB, | Performed by: OPHTHALMOLOGY

## 2023-03-10 PROCEDURE — 1159F PR MEDICATION LIST DOCUMENTED IN MEDICAL RECORD: ICD-10-PCS | Mod: HCNC,CPTII,S$GLB, | Performed by: OPHTHALMOLOGY

## 2023-03-10 PROCEDURE — 1160F PR REVIEW ALL MEDS BY PRESCRIBER/CLIN PHARMACIST DOCUMENTED: ICD-10-PCS | Mod: HCNC,CPTII,S$GLB, | Performed by: OPHTHALMOLOGY

## 2023-03-10 PROCEDURE — 1126F PR PAIN SEVERITY QUANTIFIED, NO PAIN PRESENT: ICD-10-PCS | Mod: HCNC,CPTII,S$GLB, | Performed by: OPHTHALMOLOGY

## 2023-03-10 PROCEDURE — 92014 COMPRE OPH EXAM EST PT 1/>: CPT | Mod: HCNC,S$GLB,, | Performed by: OPHTHALMOLOGY

## 2023-03-10 PROCEDURE — 99999 PR PBB SHADOW E&M-EST. PATIENT-LVL II: ICD-10-PCS | Mod: PBBFAC,HCNC,, | Performed by: OPHTHALMOLOGY

## 2023-03-10 PROCEDURE — 92134 CPTRZ OPH DX IMG PST SGM RTA: CPT | Mod: HCNC,S$GLB,, | Performed by: OPHTHALMOLOGY

## 2023-03-10 NOTE — PROGRESS NOTES
===============================  Date today is 3/10/2023  Jose Latham is a 88 y.o. male  Last visit [facility]: :6/6/2022   Last visit eye dept. Visit date not found    Corrected distance visual acuity was 20/30 in the right eye and 20/200 in the left eye.  Tonometry       Tonometry (Applanation, 8:15 AM)         Right Left    Pressure 17 19                  Wearing Rx       Wearing Rx         Sphere Cylinder Axis Add    Right -0.50 +1.25 015 +3.00    Left -1.00 +1.50 176 +3.00      Type: PAL                  Not recorded       Not recorded       Chief Complaint   Patient presents with    Macular Degeneration     9 months check up     HPI     Macular Degeneration     Additional comments: 9 months check up           Comments    1. Wet AMD OS   S/p Avastin OS x 3 (04/29/2021)   2. Dr carolyn AMD OD   AREDS.AG     3. PVD OU   4. PCIOL OU  5. OPC OS   S/P yag cap done OD - 5/20/2021           Last edited by Mari Soliman on 3/10/2023  8:04 AM.      Problem List Items Addressed This Visit          Eye/Vision problems    Exudative age-related macular degeneration of left eye with active choroidal neovascularization    Nonexudative age-related macular degeneration, right eye, intermediate dry stage - Primary    Relevant Orders    Posterior Segment OCT Retina-Both eyes (Completed)    Color Fundus Photography - OU - Both Eyes (Completed)     Instructed to call 24/7 for any worsening of vision, visual distortion or pain.  Check OU independently daily.    Gave my office and personal cell phone number.  ________________  3/10/2023 today  Jose Latham    Dry SMD OD  Inactive SRN OS  OCT stable OU  PCIOL OU  No shots needed at this time  C/d 0.45 OD  Small geographic atrophy OD  No sign of active SRN  Central geographic atrophy OS  Red free optos    RTC 1 year  Instructed to call 24/7 for any worsening of vision or symptoms. Check OU daily.   Gave my office and cell phone number.    :=============================

## 2023-04-03 ENCOUNTER — OFFICE VISIT (OUTPATIENT)
Dept: DERMATOLOGY | Facility: CLINIC | Age: 88
End: 2023-04-03
Payer: MEDICARE

## 2023-04-03 DIAGNOSIS — L57.0 ACTINIC KERATOSES: ICD-10-CM

## 2023-04-03 DIAGNOSIS — Z85.820 HISTORY OF MELANOMA: ICD-10-CM

## 2023-04-03 DIAGNOSIS — Z85.828 HISTORY OF SKIN CANCER: ICD-10-CM

## 2023-04-03 DIAGNOSIS — Z12.83 SCREENING, MALIGNANT NEOPLASM, SKIN: ICD-10-CM

## 2023-04-03 DIAGNOSIS — L82.1 SEBORRHEIC KERATOSIS: ICD-10-CM

## 2023-04-03 DIAGNOSIS — L90.5 SCAR CONDITIONS/SKIN FIBROSIS: Primary | ICD-10-CM

## 2023-04-03 PROCEDURE — 17004 PR DESTRUCTION, PREMALIGNANT LESIONS; 15 OR MORE LESIONS: ICD-10-PCS | Mod: HCNC,S$GLB,, | Performed by: DERMATOLOGY

## 2023-04-03 PROCEDURE — 1160F PR REVIEW ALL MEDS BY PRESCRIBER/CLIN PHARMACIST DOCUMENTED: ICD-10-PCS | Mod: HCNC,CPTII,S$GLB, | Performed by: DERMATOLOGY

## 2023-04-03 PROCEDURE — 1126F PR PAIN SEVERITY QUANTIFIED, NO PAIN PRESENT: ICD-10-PCS | Mod: HCNC,CPTII,S$GLB, | Performed by: DERMATOLOGY

## 2023-04-03 PROCEDURE — 99999 PR PBB SHADOW E&M-EST. PATIENT-LVL III: ICD-10-PCS | Mod: PBBFAC,HCNC,, | Performed by: DERMATOLOGY

## 2023-04-03 PROCEDURE — 17004 DESTROY PREMAL LESIONS 15/>: CPT | Mod: HCNC,S$GLB,, | Performed by: DERMATOLOGY

## 2023-04-03 PROCEDURE — 1160F RVW MEDS BY RX/DR IN RCRD: CPT | Mod: HCNC,CPTII,S$GLB, | Performed by: DERMATOLOGY

## 2023-04-03 PROCEDURE — 1159F PR MEDICATION LIST DOCUMENTED IN MEDICAL RECORD: ICD-10-PCS | Mod: HCNC,CPTII,S$GLB, | Performed by: DERMATOLOGY

## 2023-04-03 PROCEDURE — 1101F PR PT FALLS ASSESS DOC 0-1 FALLS W/OUT INJ PAST YR: ICD-10-PCS | Mod: HCNC,CPTII,S$GLB, | Performed by: DERMATOLOGY

## 2023-04-03 PROCEDURE — 99999 PR PBB SHADOW E&M-EST. PATIENT-LVL III: CPT | Mod: PBBFAC,HCNC,, | Performed by: DERMATOLOGY

## 2023-04-03 PROCEDURE — 1159F MED LIST DOCD IN RCRD: CPT | Mod: HCNC,CPTII,S$GLB, | Performed by: DERMATOLOGY

## 2023-04-03 PROCEDURE — 3288F FALL RISK ASSESSMENT DOCD: CPT | Mod: HCNC,CPTII,S$GLB, | Performed by: DERMATOLOGY

## 2023-04-03 PROCEDURE — 99203 OFFICE O/P NEW LOW 30 MIN: CPT | Mod: 25,HCNC,S$GLB, | Performed by: DERMATOLOGY

## 2023-04-03 PROCEDURE — 1101F PT FALLS ASSESS-DOCD LE1/YR: CPT | Mod: HCNC,CPTII,S$GLB, | Performed by: DERMATOLOGY

## 2023-04-03 PROCEDURE — 99203 PR OFFICE/OUTPT VISIT, NEW, LEVL III, 30-44 MIN: ICD-10-PCS | Mod: 25,HCNC,S$GLB, | Performed by: DERMATOLOGY

## 2023-04-03 PROCEDURE — 3288F PR FALLS RISK ASSESSMENT DOCUMENTED: ICD-10-PCS | Mod: HCNC,CPTII,S$GLB, | Performed by: DERMATOLOGY

## 2023-04-03 PROCEDURE — 1126F AMNT PAIN NOTED NONE PRSNT: CPT | Mod: HCNC,CPTII,S$GLB, | Performed by: DERMATOLOGY

## 2023-04-03 NOTE — PROGRESS NOTES
Subjective:      Patient ID:  Jose Latham is a 88 y.o. male who presents for No chief complaint on file.    Hx of basal cell carcinoma on the left anterior thigh (s/p Mohs by Dr. Casey on 2/5/15), last skin check by Dr. Kwong on 1/21/15.  He c/o several crusted areas of the chest.  No tx tried.      This is a high risk patient here to check for the development of new lesions.    Denies bleeding, tender, growing, or concerning lesions.         Review of Systems   Constitutional:  Negative for fever and chills.   Gastrointestinal:  Negative for nausea and vomiting.   Skin:  Positive for activity-related sunscreen use. Negative for daily sunscreen use and recent sunburn.   Hematologic/Lymphatic: Does not bruise/bleed easily.     Objective:   Physical Exam   Constitutional: He appears well-developed and well-nourished. No distress.   Neurological: He is alert and oriented to person, place, and time. He is not disoriented.   Psychiatric: He has a normal mood and affect.   Skin:   Areas Examined (abnormalities noted in diagram):   Head / Face Inspection Performed  Neck Inspection Performed  Chest / Axilla Inspection Performed  Abdomen Inspection Performed  Back Inspection Performed  RUE Inspected  LUE Inspection Performed  RLE Inspected  LLE Inspection Performed  Nails and Digits Inspection Performed               Diagram Legend     Erythematous scaling macule/papule c/w actinic keratosis       Vascular papule c/w angioma      Pigmented verrucoid papule/plaque c/w seborrheic keratosis      Yellow umbilicated papule c/w sebaceous hyperplasia      Irregularly shaped tan macule c/w lentigo     1-2 mm smooth white papules consistent with Milia      Movable subcutaneous cyst with punctum c/w epidermal inclusion cyst      Subcutaneous movable cyst c/w pilar cyst      Firm pink to brown papule c/w dermatofibroma      Pedunculated fleshy papule(s) c/w skin tag(s)      Evenly pigmented macule c/w junctional nevus      Mildly variegated pigmented, slightly irregular-bordered macule c/w mildly atypical nevus      Flesh colored to evenly pigmented papule c/w intradermal nevus       Pink pearly papule/plaque c/w basal cell carcinoma      Erythematous hyperkeratotic cursted plaque c/w SCC      Surgical scar with no sign of skin cancer recurrence      Open and closed comedones      Inflammatory papules and pustules      Verrucoid papule consistent consistent with wart     Erythematous eczematous patches and plaques     Dystrophic onycholytic nail with subungual debris c/w onychomycosis     Umbilicated papule    Erythematous-base heme-crusted tan verrucoid plaque consistent with inflamed seborrheic keratosis     Erythematous Silvery Scaling Plaque c/w Psoriasis     See annotation      Assessment / Plan:        Scar conditions/skin fibrosis  Screening, malignant neoplasm, skin  History of skin cancer  Scar of the left anterior thigh, hx of NMSC.  No evidence of recurrence on physical exam today.  Continue routine skin surveillance. Daily sunscreen advised.    Seborrheic keratosis  Reassurance given. Discussed diagnosis and that lesions are benign.  AAD handout given.     Actinic keratoses  Cryosurgery Procedure Note    The patient is informed of the precancerous quality and need for treatment of these lesions. After risks, benefits and alternatives explained, including blistering, pain, hyper- and hypopigmentation, patient verbally consents to cryotherapy to precancerous lesions. Liquid nitrogen cryosurgery is applied to the 20 actinic keratoses, as detailed in the physical exam, to produce a freeze injury. The patient is aware that blisters may form and is instructed on wound care with gentle cleansing and use of vaseline ointment to keep moist until healed. The patient is supplied a handout on cryosurgery and is instructed to call if lesions do not completely resolve.             Follow up in about 1 year (around 4/3/2024).

## 2023-04-03 NOTE — PATIENT INSTRUCTIONS
Preventing Skin Cancer  Relaxing in the sun may feel good. But it isnt good for your skin. In fact, being exposed to the suns harmful rays is a major cause of skin cancer. This is a serious disease that can be life-threatening. People of all ages and backgrounds are at risk. But in most cases, skin cancer can be prevented.    Your Role in Prevention  You can act today to help prevent skin cancer. Start by avoiding the suns UV (ultraviolet) rays. And dont use tanning beds, which are no safer than the sun. Taking these steps can help keep you from getting skin cancer. It can also help prevent wrinkles and other sun-induced aging effects. Make sure your children also follow these safeguards. Now is the time to start taking preventive steps against skin cancer.  When You Are Outdoors  Protect your skin when you go outdoors during the day. Take precautions whenever you go out to eat, run errands by car or on foot, or do any outdoor activity. There isnt just one easy way to protect your skin. Its best to follow all of these steps:  Wear tightly woven clothing that covers your skin. Put on a wide-brimmed hat to protect your face, ears, and scalp.  Watch the clock. Try to avoid the sun between 10 a.m. and 4 p.m., when it is strongest.  Head for the shade or create your own. Use an umbrella when sitting or strolling.  Know that the suns rays can reflect off sand, water, and snow. This can harm your skin. Take extra care when you are near reflective surfaces.  Keep in mind that even when the weather is hazy or cloudy, your skin can be exposed to strong UV rays.  Shield your skin with sunscreen. Also, apply sunscreen to your childrens skin.  Tips for Using Sunscreen  To help prevent skin cancer, choose the right sunscreen and use it correctly. Try the following tips:  Choose a sunscreen that has a sun protection factor (SPF) of at least 15. For the best protection, an SPF of at least 30 is preferred. Also, choose a  sunscreen labeled broad spectrum. This will shield you from both UVA and UVB (ultraviolet A and B) rays.  If one brand irritates your skin, try another, particularly ones without fragrance.  Use a water-resistant sunscreen if swimming or sweating.  Reapply sunscreen every 2 hours. If youre active, do this more often.  Cover any sun-exposed skin, from your face to your feet. Dont forget your ears and your lips.  Know that while sunscreen helps protect you, it isnt enough. You should also wear protective clothing. And try to stay out of the sun as much as you can, especially from 10 a.m. to 4 p.m.  © 4557-8666 [employer]. [address] [place] [address], [place] [address]. All rights reserved. This information is not intended as a substitute for professional medical care. Always follow your healthcare professional's instructions.      CRYOSURGERY      Your doctor has used a method called cryosurgery to treat your skin condition. Cryosurgery refers to the use of very cold substances to treat a variety of skin conditions such as warts, pre-skin cancers, molluscum contagiosum, sun spots, and several benign growths. The substance we use in cryosurgery is liquid nitrogen and is so cold (-195 degrees Celsius) that is burns when administered.     Following treatment in the office, the skin may immediately burn and become red. You may find the area around the lesion is affected as well. It is sometimes necessary to treat not only the lesion, but a small area of the surrounding normal skin to achieve a good response.     A blister, and even a blood filled blister, may form after treatment.   This is a normal response. If the blister is painful, it is acceptable to sterilize a needle and with rubbing alcohol and gently pop the blister. It is important that you gently wash the area with soap and warm water as the blister fluid may contain wart virus if a wart was treated. Do no remove the roof of the blister.     The  area treated can take anywhere from 1-3 weeks to heal. Healing time depends on the kind of skin lesion treated, the location, and how aggressively the lesion was treated. It is recommended that the areas treated are covered with Vaseline or bacitracin ointment and a band-aid. If a band-aid is not practical, just ointment applied several times per day will do. Keeping these areas moist will speed the healing time.    Treatment with liquid nitrogen can leave a scar. In dark skin, it may be a light or dark scar, in light skin it may be a white or pink scar. These will generally fade with time.    If you have any concerns after your treatment, please feel free to call the office.         [facility] - DERMATOLOGY  [id number] [place] [id number]  Dept: [phone]  Dept Fax: [phone]

## 2023-04-26 ENCOUNTER — PATIENT MESSAGE (OUTPATIENT)
Dept: INTERNAL MEDICINE | Facility: CLINIC | Age: 88
End: 2023-04-26
Payer: MEDICARE

## 2023-05-17 ENCOUNTER — PES CALL (OUTPATIENT)
Dept: ADMINISTRATIVE | Facility: CLINIC | Age: 88
End: 2023-05-17
Payer: MEDICARE

## 2023-05-31 NOTE — H&P
Subjective:       Patient ID: Jose Latham is a 83 y.o. male.    Chief Complaint: Pre-op Exam    HPI  Review of Systems   Constitutional: Negative.    HENT: Negative.    Eyes: Positive for visual disturbance.   Respiratory: Negative.    Cardiovascular: Negative.    Gastrointestinal: Negative.    Endocrine: Negative.    Genitourinary: Negative.        Objective:      Physical Exam   Constitutional: He is oriented to person, place, and time. He appears well-nourished.   HENT:   Head: Normocephalic and atraumatic.   Cardiovascular: Normal rate.    Pulmonary/Chest: Effort normal.   Neurological: He is alert and oriented to person, place, and time.   Skin: Skin is warm and dry.   Psychiatric: He has a normal mood and affect.       Assessment:       1. Nuclear sclerotic cataract of right eye    2. Nuclear sclerotic cataract of both eyes    3. Cataracta brunescens of both eyes    4. Early dry stage nonexudative age-related macular degeneration of both eyes        Plan:       Complex phaco/IOL od - dense brown lens - trypan blue       Fortino Diaz)

## 2023-07-31 ENCOUNTER — OFFICE VISIT (OUTPATIENT)
Dept: INTERNAL MEDICINE | Facility: CLINIC | Age: 88
End: 2023-07-31
Payer: MEDICARE

## 2023-07-31 VITALS
WEIGHT: 171.31 LBS | OXYGEN SATURATION: 97 % | HEART RATE: 69 BPM | SYSTOLIC BLOOD PRESSURE: 138 MMHG | HEIGHT: 73 IN | DIASTOLIC BLOOD PRESSURE: 86 MMHG | BODY MASS INDEX: 22.7 KG/M2 | TEMPERATURE: 98 F

## 2023-07-31 DIAGNOSIS — N52.9 ERECTILE DYSFUNCTION, UNSPECIFIED ERECTILE DYSFUNCTION TYPE: ICD-10-CM

## 2023-07-31 DIAGNOSIS — I10 ESSENTIAL HYPERTENSION: Primary | ICD-10-CM

## 2023-07-31 PROCEDURE — 1101F PR PT FALLS ASSESS DOC 0-1 FALLS W/OUT INJ PAST YR: ICD-10-PCS | Mod: HCNC,CPTII,S$GLB, | Performed by: NURSE PRACTITIONER

## 2023-07-31 PROCEDURE — 99213 OFFICE O/P EST LOW 20 MIN: CPT | Mod: HCNC,S$GLB,, | Performed by: NURSE PRACTITIONER

## 2023-07-31 PROCEDURE — 1126F PR PAIN SEVERITY QUANTIFIED, NO PAIN PRESENT: ICD-10-PCS | Mod: HCNC,CPTII,S$GLB, | Performed by: NURSE PRACTITIONER

## 2023-07-31 PROCEDURE — 99213 PR OFFICE/OUTPT VISIT, EST, LEVL III, 20-29 MIN: ICD-10-PCS | Mod: HCNC,S$GLB,, | Performed by: NURSE PRACTITIONER

## 2023-07-31 PROCEDURE — 1160F RVW MEDS BY RX/DR IN RCRD: CPT | Mod: HCNC,CPTII,S$GLB, | Performed by: NURSE PRACTITIONER

## 2023-07-31 PROCEDURE — 1160F PR REVIEW ALL MEDS BY PRESCRIBER/CLIN PHARMACIST DOCUMENTED: ICD-10-PCS | Mod: HCNC,CPTII,S$GLB, | Performed by: NURSE PRACTITIONER

## 2023-07-31 PROCEDURE — 99999 PR PBB SHADOW E&M-EST. PATIENT-LVL III: ICD-10-PCS | Mod: PBBFAC,HCNC,, | Performed by: NURSE PRACTITIONER

## 2023-07-31 PROCEDURE — 3288F FALL RISK ASSESSMENT DOCD: CPT | Mod: HCNC,CPTII,S$GLB, | Performed by: NURSE PRACTITIONER

## 2023-07-31 PROCEDURE — 1159F MED LIST DOCD IN RCRD: CPT | Mod: HCNC,CPTII,S$GLB, | Performed by: NURSE PRACTITIONER

## 2023-07-31 PROCEDURE — 99999 PR PBB SHADOW E&M-EST. PATIENT-LVL III: CPT | Mod: PBBFAC,HCNC,, | Performed by: NURSE PRACTITIONER

## 2023-07-31 PROCEDURE — 3288F PR FALLS RISK ASSESSMENT DOCUMENTED: ICD-10-PCS | Mod: HCNC,CPTII,S$GLB, | Performed by: NURSE PRACTITIONER

## 2023-07-31 PROCEDURE — 1159F PR MEDICATION LIST DOCUMENTED IN MEDICAL RECORD: ICD-10-PCS | Mod: HCNC,CPTII,S$GLB, | Performed by: NURSE PRACTITIONER

## 2023-07-31 PROCEDURE — 1101F PT FALLS ASSESS-DOCD LE1/YR: CPT | Mod: HCNC,CPTII,S$GLB, | Performed by: NURSE PRACTITIONER

## 2023-07-31 PROCEDURE — 1126F AMNT PAIN NOTED NONE PRSNT: CPT | Mod: HCNC,CPTII,S$GLB, | Performed by: NURSE PRACTITIONER

## 2023-07-31 RX ORDER — SILDENAFIL 50 MG/1
50 TABLET, FILM COATED ORAL DAILY PRN
Qty: 30 TABLET | Refills: 0 | Status: SHIPPED | OUTPATIENT
Start: 2023-07-31 | End: 2024-02-01

## 2023-07-31 NOTE — PROGRESS NOTES
"Subjective:       Patient ID: Jose Latham is a 88 y.o. male.    Chief Complaint: Follow-up    Pt presents to clinic today for blood pressure follow up  Overall he has been doing well   Tolerating his meds without any problems    He is requesting something to help with ED  Trouble maintaining erections  Never tried any meds in the past  No history of cardiac issues, no chest pain         /86   Pulse 69   Temp 97.9 °F (36.6 °C) (Tympanic)   Ht 6' 1" (1.854 m)   Wt 77.7 kg (171 lb 4.8 oz)   SpO2 97%   BMI 22.60 kg/m²     Review of Systems   Constitutional:  Negative for activity change, appetite change, chills, diaphoresis, fatigue, fever and unexpected weight change.   HENT: Negative.     Eyes: Negative.    Respiratory:  Negative for apnea, chest tightness, shortness of breath and stridor.    Cardiovascular:  Negative for chest pain, palpitations and leg swelling.   Gastrointestinal: Negative.    Endocrine: Negative.    Genitourinary: Negative.    Musculoskeletal:  Negative for arthralgias and myalgias.   Skin:  Negative for color change, pallor, rash and wound.   Allergic/Immunologic: Negative.    Neurological:  Negative for dizziness, facial asymmetry, light-headedness and headaches.   Hematological:  Negative for adenopathy.   Psychiatric/Behavioral:  Negative for agitation and behavioral problems.        Objective:      Physical Exam  Vitals and nursing note reviewed.   Constitutional:       General: He is not in acute distress.     Appearance: He is well-developed. He is not diaphoretic.   HENT:      Head: Normocephalic and atraumatic.   Cardiovascular:      Rate and Rhythm: Normal rate and regular rhythm.      Heart sounds: Normal heart sounds.   Pulmonary:      Effort: Pulmonary effort is normal. No respiratory distress.      Breath sounds: Normal breath sounds.   Skin:     General: Skin is warm and dry.      Findings: No rash.   Neurological:      Mental Status: He is alert and oriented to " person, place, and time.   Psychiatric:         Behavior: Behavior normal.         Thought Content: Thought content normal.         Judgment: Judgment normal.         Assessment:       1. Essential hypertension    2. Erectile dysfunction, unspecified erectile dysfunction type    3. BMI 22.0-22.9, adult        Plan:       Jose was seen today for follow-up.    Diagnoses and all orders for this visit:    Essential hypertension  -     CBC Auto Differential; Future  -     Comprehensive Metabolic Panel; Future  -     Lipid Panel; Future  - Chronic, stable on current meds. Continue     Erectile dysfunction, unspecified erectile dysfunction type  -     sildenafiL (VIAGRA) 50 MG tablet; Take 1 tablet (50 mg total) by mouth daily as needed for Erectile Dysfunction.  - Trial of low dose viagra discussed. Risk/benefits discussed. Pt desires a trial.    BMI 22.0-22.9, adult      6 month follow up with Dr. Calle with labs prior

## 2023-08-19 ENCOUNTER — TELEPHONE (OUTPATIENT)
Dept: ADMINISTRATIVE | Facility: HOSPITAL | Age: 88
End: 2023-08-19
Payer: MEDICARE

## 2023-09-30 DIAGNOSIS — I10 ESSENTIAL HYPERTENSION: ICD-10-CM

## 2023-09-30 RX ORDER — LOSARTAN POTASSIUM 25 MG/1
25 TABLET ORAL
Qty: 90 TABLET | Refills: 1 | Status: SHIPPED | OUTPATIENT
Start: 2023-09-30 | End: 2024-03-26

## 2023-09-30 NOTE — TELEPHONE ENCOUNTER
Refill Decision Note   Jose Latham  is requesting a refill authorization.  Brief Assessment and Rationale for Refill:  Approve     Medication Therapy Plan:         Comments:     Note composed:8:18 AM 09/30/2023

## 2023-09-30 NOTE — TELEPHONE ENCOUNTER
No care due was identified.  [facility] Embedded Care Due Messages. Reference number: [id number].   9/30/2023 1:16:47 AM CDT

## 2023-11-05 ENCOUNTER — PATIENT MESSAGE (OUTPATIENT)
Dept: ADMINISTRATIVE | Facility: CLINIC | Age: 88
End: 2023-11-05
Payer: MEDICARE

## 2023-12-18 ENCOUNTER — OFFICE VISIT (OUTPATIENT)
Dept: URGENT CARE | Facility: CLINIC | Age: 88
End: 2023-12-18
Payer: MEDICARE

## 2023-12-18 VITALS
BODY MASS INDEX: 25.72 KG/M2 | SYSTOLIC BLOOD PRESSURE: 110 MMHG | WEIGHT: 173.63 LBS | TEMPERATURE: 98 F | HEART RATE: 67 BPM | RESPIRATION RATE: 14 BRPM | DIASTOLIC BLOOD PRESSURE: 59 MMHG | HEIGHT: 69 IN | OXYGEN SATURATION: 97 %

## 2023-12-18 DIAGNOSIS — J10.1 INFLUENZA A: Primary | ICD-10-CM

## 2023-12-18 DIAGNOSIS — R09.82 POSTNASAL DRIP: ICD-10-CM

## 2023-12-18 DIAGNOSIS — R05.9 COUGH IN ADULT PATIENT: ICD-10-CM

## 2023-12-18 DIAGNOSIS — R68.83 CHILLS (WITHOUT FEVER): ICD-10-CM

## 2023-12-18 DIAGNOSIS — J02.9 ACUTE SORE THROAT: ICD-10-CM

## 2023-12-18 DIAGNOSIS — R51.9 SINUS HEADACHE: ICD-10-CM

## 2023-12-18 LAB
CTP QC/QA: YES
MOLECULAR STREP A: NEGATIVE
POC MOLECULAR INFLUENZA A AGN: POSITIVE
POC MOLECULAR INFLUENZA B AGN: NEGATIVE
SARS-COV-2 AG RESP QL IA.RAPID: NEGATIVE

## 2023-12-18 PROCEDURE — 99213 OFFICE O/P EST LOW 20 MIN: CPT | Mod: S$GLB,,, | Performed by: NURSE PRACTITIONER

## 2023-12-18 PROCEDURE — 87502 POCT INFLUENZA A/B MOLECULAR: ICD-10-PCS | Mod: QW,S$GLB,, | Performed by: NURSE PRACTITIONER

## 2023-12-18 PROCEDURE — 87502 INFLUENZA DNA AMP PROBE: CPT | Mod: QW,S$GLB,, | Performed by: NURSE PRACTITIONER

## 2023-12-18 PROCEDURE — 87651 POCT STREP A MOLECULAR: ICD-10-PCS | Mod: QW,S$GLB,, | Performed by: NURSE PRACTITIONER

## 2023-12-18 PROCEDURE — 87651 STREP A DNA AMP PROBE: CPT | Mod: QW,S$GLB,, | Performed by: NURSE PRACTITIONER

## 2023-12-18 PROCEDURE — 87811 SARS-COV-2 COVID19 W/OPTIC: CPT | Mod: QW,S$GLB,, | Performed by: NURSE PRACTITIONER

## 2023-12-18 PROCEDURE — 99213 PR OFFICE/OUTPT VISIT, EST, LEVL III, 20-29 MIN: ICD-10-PCS | Mod: S$GLB,,, | Performed by: NURSE PRACTITIONER

## 2023-12-18 PROCEDURE — 87811 SARS CORONAVIRUS 2 ANTIGEN POCT, MANUAL READ: ICD-10-PCS | Mod: QW,S$GLB,, | Performed by: NURSE PRACTITIONER

## 2023-12-18 RX ORDER — BENZONATATE 200 MG/1
200 CAPSULE ORAL 3 TIMES DAILY PRN
Qty: 21 CAPSULE | Refills: 0 | Status: SHIPPED | OUTPATIENT
Start: 2023-12-18 | End: 2023-12-25

## 2023-12-18 RX ORDER — CETIRIZINE HYDROCHLORIDE 10 MG/1
10 TABLET ORAL DAILY
Qty: 30 TABLET | Refills: 0 | COMMUNITY
Start: 2023-12-18 | End: 2024-02-01

## 2023-12-18 RX ORDER — PROMETHAZINE HYDROCHLORIDE AND DEXTROMETHORPHAN HYDROBROMIDE 6.25; 15 MG/5ML; MG/5ML
5 SYRUP ORAL NIGHTLY PRN
Qty: 118 ML | Refills: 0 | Status: SHIPPED | OUTPATIENT
Start: 2023-12-18 | End: 2024-02-01

## 2023-12-18 NOTE — PROGRESS NOTES
"Subjective:      Patient ID: Jose Latham is a 89 y.o. male.    Vitals:  height is 5' 9.45" (1.764 m) and weight is 78.8 kg (173 lb 9.8 oz). His oral temperature is 97.9 °F (36.6 °C). His blood pressure is 110/59 (abnormal) and his pulse is 67. His respiration is 14 and oxygen saturation is 97%.     Chief Complaint: Cough    Patient presents today with cough and sore throat. Patient has not had fever but has been cold. No direct exposure to anyone sick.    Cough  This is a new problem. The current episode started in the past 7 days (12/16). The problem occurs constantly. The cough is Productive of sputum. Associated symptoms include chills, headaches, nasal congestion, postnasal drip, rhinorrhea and a sore throat. Pertinent negatives include no ear congestion, ear pain, fever, myalgias, shortness of breath or wheezing. Treatments tried: mucinex, nasal spray. The treatment provided no relief. There is no history of asthma, bronchitis, COPD, emphysema or pneumonia.       Constitution: Positive for chills. Negative for fever.   HENT:  Positive for postnasal drip and sore throat. Negative for ear pain.    Respiratory:  Positive for cough. Negative for shortness of breath and wheezing.    Musculoskeletal:  Negative for muscle ache.   Neurological:  Positive for headaches.      Objective:     Physical Exam    Assessment:     1. Influenza A    2. Chills (without fever)    3. Postnasal drip    4. Acute sore throat    5. Cough in adult patient    6. Sinus headache      Results for orders placed or performed in visit on 12/18/23   POCT Influenza A/B MOLECULAR   Result Value Ref Range    POC Molecular Influenza A Ag Positive (A) Negative, Not Reported    POC Molecular Influenza B Ag Negative Negative, Not Reported     Acceptable Yes    SARS Coronavirus 2 Antigen, POCT Manual Read   Result Value Ref Range    SARS Coronavirus 2 Antigen Negative Negative     Acceptable Yes    POCT Strep A, " Molecular   Result Value Ref Range    Molecular Strep A, POC Negative Negative     Acceptable Yes        Plan:       Influenza A    Chills (without fever)  -     POCT Influenza A/B MOLECULAR  -     SARS Coronavirus 2 Antigen, POCT Manual Read    Postnasal drip  -     POCT Influenza A/B MOLECULAR  -     SARS Coronavirus 2 Antigen, POCT Manual Read    Acute sore throat  -     POCT Influenza A/B MOLECULAR  -     SARS Coronavirus 2 Antigen, POCT Manual Read  -     POCT Strep A, Molecular    Cough in adult patient  -     POCT Influenza A/B MOLECULAR  -     SARS Coronavirus 2 Antigen, POCT Manual Read    Sinus headache  -     POCT Influenza A/B MOLECULAR  -     SARS Coronavirus 2 Antigen, POCT Manual Read          Increase fluids.  Get plenty of rest.   Normal saline nasal wash to irrigate sinuses and for congestion/runny nose.  Cool mist humidifier/vaporizer.  Practice good handwashing.  Mucinex for cough and chest congestion.  Tylenol or Ibuprofen for fever, headache and body aches.  Warm salt water gargles for throat comfort.  Chloraseptic spray or lozenges for throat comfort.  See PCP or go to ER if symptoms worsen or fail to improve with treatment.

## 2024-01-22 ENCOUNTER — TELEPHONE (OUTPATIENT)
Dept: DERMATOLOGY | Facility: CLINIC | Age: 89
End: 2024-01-22

## 2024-01-25 ENCOUNTER — LAB VISIT (OUTPATIENT)
Dept: LAB | Facility: HOSPITAL | Age: 89
End: 2024-01-25
Attending: NURSE PRACTITIONER
Payer: MEDICARE

## 2024-01-25 DIAGNOSIS — I10 ESSENTIAL HYPERTENSION: ICD-10-CM

## 2024-01-25 LAB
ALBUMIN SERPL BCP-MCNC: 3.3 G/DL (ref 3.5–5.2)
ALP SERPL-CCNC: 46 U/L (ref 55–135)
ALT SERPL W/O P-5'-P-CCNC: 9 U/L (ref 10–44)
ANION GAP SERPL CALC-SCNC: 5 MMOL/L (ref 8–16)
AST SERPL-CCNC: 14 U/L (ref 10–40)
BASOPHILS # BLD AUTO: 0.09 K/UL (ref 0–0.2)
BASOPHILS NFR BLD: 1.6 % (ref 0–1.9)
BILIRUB SERPL-MCNC: 0.7 MG/DL (ref 0.1–1)
BUN SERPL-MCNC: 18 MG/DL (ref 8–23)
CALCIUM SERPL-MCNC: 9.2 MG/DL (ref 8.7–10.5)
CHLORIDE SERPL-SCNC: 107 MMOL/L (ref 95–110)
CHOLEST SERPL-MCNC: 181 MG/DL (ref 120–199)
CHOLEST/HDLC SERPL: 2.9 {RATIO} (ref 2–5)
CO2 SERPL-SCNC: 26 MMOL/L (ref 23–29)
CREAT SERPL-MCNC: 1 MG/DL (ref 0.5–1.4)
DIFFERENTIAL METHOD BLD: ABNORMAL
EOSINOPHIL # BLD AUTO: 0.4 K/UL (ref 0–0.5)
EOSINOPHIL NFR BLD: 6.9 % (ref 0–8)
ERYTHROCYTE [DISTWIDTH] IN BLOOD BY AUTOMATED COUNT: 13.9 % (ref 11.5–14.5)
EST. GFR  (NO RACE VARIABLE): >60 ML/MIN/1.73 M^2
GLUCOSE SERPL-MCNC: 97 MG/DL (ref 70–110)
HCT VFR BLD AUTO: 39.6 % (ref 40–54)
HDLC SERPL-MCNC: 63 MG/DL (ref 40–75)
HDLC SERPL: 34.8 % (ref 20–50)
HGB BLD-MCNC: 12.9 G/DL (ref 14–18)
IMM GRANULOCYTES # BLD AUTO: 0.01 K/UL (ref 0–0.04)
IMM GRANULOCYTES NFR BLD AUTO: 0.2 % (ref 0–0.5)
LDLC SERPL CALC-MCNC: 107.8 MG/DL (ref 63–159)
LYMPHOCYTES # BLD AUTO: 1.7 K/UL (ref 1–4.8)
LYMPHOCYTES NFR BLD: 30 % (ref 18–48)
MCH RBC QN AUTO: 29.1 PG (ref 27–31)
MCHC RBC AUTO-ENTMCNC: 32.6 G/DL (ref 32–36)
MCV RBC AUTO: 89 FL (ref 82–98)
MONOCYTES # BLD AUTO: 0.5 K/UL (ref 0.3–1)
MONOCYTES NFR BLD: 9.4 % (ref 4–15)
NEUTROPHILS # BLD AUTO: 2.9 K/UL (ref 1.8–7.7)
NEUTROPHILS NFR BLD: 51.9 % (ref 38–73)
NONHDLC SERPL-MCNC: 118 MG/DL
NRBC BLD-RTO: 0 /100 WBC
PLATELET # BLD AUTO: 310 K/UL (ref 150–450)
PMV BLD AUTO: 10.3 FL (ref 9.2–12.9)
POTASSIUM SERPL-SCNC: 4.2 MMOL/L (ref 3.5–5.1)
PROT SERPL-MCNC: 6.4 G/DL (ref 6–8.4)
RBC # BLD AUTO: 4.43 M/UL (ref 4.6–6.2)
SODIUM SERPL-SCNC: 138 MMOL/L (ref 136–145)
TRIGL SERPL-MCNC: 51 MG/DL (ref 30–150)
WBC # BLD AUTO: 5.54 K/UL (ref 3.9–12.7)

## 2024-01-25 PROCEDURE — 80061 LIPID PANEL: CPT | Mod: HCNC | Performed by: NURSE PRACTITIONER

## 2024-01-25 PROCEDURE — 85025 COMPLETE CBC W/AUTO DIFF WBC: CPT | Mod: HCNC | Performed by: NURSE PRACTITIONER

## 2024-01-25 PROCEDURE — 80053 COMPREHEN METABOLIC PANEL: CPT | Mod: HCNC | Performed by: NURSE PRACTITIONER

## 2024-01-25 PROCEDURE — 36415 COLL VENOUS BLD VENIPUNCTURE: CPT | Mod: HCNC,PO | Performed by: NURSE PRACTITIONER

## 2024-02-01 ENCOUNTER — OFFICE VISIT (OUTPATIENT)
Dept: INTERNAL MEDICINE | Facility: CLINIC | Age: 89
End: 2024-02-01
Payer: MEDICARE

## 2024-02-01 VITALS
OXYGEN SATURATION: 99 % | SYSTOLIC BLOOD PRESSURE: 130 MMHG | BODY MASS INDEX: 26.51 KG/M2 | DIASTOLIC BLOOD PRESSURE: 82 MMHG | WEIGHT: 181.88 LBS | HEART RATE: 80 BPM | TEMPERATURE: 97 F

## 2024-02-01 DIAGNOSIS — F51.01 PRIMARY INSOMNIA: ICD-10-CM

## 2024-02-01 DIAGNOSIS — H35.3222 EXUDATIVE AGE-RELATED MACULAR DEGENERATION OF LEFT EYE WITH INACTIVE CHOROIDAL NEOVASCULARIZATION: ICD-10-CM

## 2024-02-01 DIAGNOSIS — I10 ESSENTIAL HYPERTENSION: ICD-10-CM

## 2024-02-01 DIAGNOSIS — I70.0 AORTIC ATHEROSCLEROSIS: ICD-10-CM

## 2024-02-01 DIAGNOSIS — Z85.820 HISTORY OF MELANOMA: ICD-10-CM

## 2024-02-01 DIAGNOSIS — D64.9 NORMOCYTIC ANEMIA: ICD-10-CM

## 2024-02-01 DIAGNOSIS — Z00.00 ROUTINE GENERAL MEDICAL EXAMINATION AT HEALTH CARE FACILITY: Primary | ICD-10-CM

## 2024-02-01 DIAGNOSIS — N52.9 ERECTILE DYSFUNCTION, UNSPECIFIED ERECTILE DYSFUNCTION TYPE: ICD-10-CM

## 2024-02-01 DIAGNOSIS — J84.10 CALCIFIED GRANULOMA OF LUNG: ICD-10-CM

## 2024-02-01 PROCEDURE — 1160F RVW MEDS BY RX/DR IN RCRD: CPT | Mod: HCNC,CPTII,S$GLB, | Performed by: INTERNAL MEDICINE

## 2024-02-01 PROCEDURE — 1101F PT FALLS ASSESS-DOCD LE1/YR: CPT | Mod: HCNC,CPTII,S$GLB, | Performed by: INTERNAL MEDICINE

## 2024-02-01 PROCEDURE — 1158F ADVNC CARE PLAN TLK DOCD: CPT | Mod: HCNC,CPTII,S$GLB, | Performed by: INTERNAL MEDICINE

## 2024-02-01 PROCEDURE — 3288F FALL RISK ASSESSMENT DOCD: CPT | Mod: HCNC,CPTII,S$GLB, | Performed by: INTERNAL MEDICINE

## 2024-02-01 PROCEDURE — 99397 PER PM REEVAL EST PAT 65+ YR: CPT | Mod: HCNC,S$GLB,, | Performed by: INTERNAL MEDICINE

## 2024-02-01 PROCEDURE — 99999 PR PBB SHADOW E&M-EST. PATIENT-LVL III: CPT | Mod: PBBFAC,HCNC,, | Performed by: INTERNAL MEDICINE

## 2024-02-01 PROCEDURE — 1159F MED LIST DOCD IN RCRD: CPT | Mod: HCNC,CPTII,S$GLB, | Performed by: INTERNAL MEDICINE

## 2024-02-01 RX ORDER — VARDENAFIL HYDROCHLORIDE 10 MG/1
10 TABLET ORAL DAILY PRN
Qty: 30 TABLET | Refills: 0 | Status: SHIPPED | OUTPATIENT
Start: 2024-02-01

## 2024-02-01 RX ORDER — TRAZODONE HYDROCHLORIDE 50 MG/1
50 TABLET ORAL NIGHTLY
Qty: 30 TABLET | Refills: 3 | Status: SHIPPED | OUTPATIENT
Start: 2024-02-01 | End: 2024-04-29

## 2024-02-01 NOTE — PROGRESS NOTES
Subjective:       Patient ID: Jose Latham is a 89 y.o. male.    Chief Complaint: Follow-up      HPI:  Patient is a 9-year-old male presenting today for updated physical exam review of chronic health issues.  Patient has history of hypertension, atherosclerotic disease of the aorta, calcified granuloma of the lung, history of melanoma, macular degeneration, insomnia, erectile dysfunction and normocytic anemia.  Overall patient indicates he has been doing well.  He notes no acute issues at this time.  He has struggling a bit with his insomnia still would like to see if there is other options for it.  He also describes he had some side effects with Viagra and wanted to see if we could try another erectile dysfunction med.  We discussed that the side effect that he was having which was some mild palpitations could be related to blood pressure changes and maybe a class effect.  We will do a trial with another agent and see if he has the same problem.    Review of Systems   Constitutional:  Negative for fever and unexpected weight change.   HENT:  Negative for hearing loss, postnasal drip and rhinorrhea.    Eyes:  Negative for pain and visual disturbance.   Respiratory:  Negative for cough, shortness of breath and wheezing.    Cardiovascular:  Negative for chest pain and palpitations.   Gastrointestinal:  Negative for constipation, diarrhea, nausea and vomiting.   Genitourinary:  Negative for dysuria and hematuria.   Musculoskeletal:  Negative for arthralgias, back pain, myalgias and neck stiffness.   Skin:  Negative for pallor and rash.   Neurological:  Negative for seizures, syncope and headaches.   Hematological:  Negative for adenopathy.   Psychiatric/Behavioral:  Negative for dysphoric mood. The patient is not nervous/anxious.        Objective:   /82   Pulse 80   Temp 97.1 °F (36.2 °C)   Wt 82.5 kg (181 lb 14.1 oz)   SpO2 99%   BMI 26.51 kg/m²      Physical Exam  Vitals reviewed.   Constitutional:        General: He is not in acute distress.     Appearance: He is well-developed.   HENT:      Head: Normocephalic and atraumatic.      Right Ear: Tympanic membrane and ear canal normal.      Left Ear: Tympanic membrane and ear canal normal.   Eyes:      Pupils: Pupils are equal, round, and reactive to light.   Neck:      Thyroid: No thyromegaly.      Vascular: No JVD.   Cardiovascular:      Rate and Rhythm: Normal rate and regular rhythm.      Heart sounds: Normal heart sounds. No murmur heard.     No friction rub. No gallop.   Pulmonary:      Effort: Pulmonary effort is normal.      Breath sounds: Normal breath sounds. No wheezing or rales.   Abdominal:      General: Bowel sounds are normal. There is no distension.      Palpations: Abdomen is soft.      Tenderness: There is no abdominal tenderness. There is no guarding or rebound.   Musculoskeletal:         General: Normal range of motion.      Cervical back: Normal range of motion and neck supple.   Lymphadenopathy:      Cervical: No cervical adenopathy.   Skin:     General: Skin is warm and dry.      Findings: No rash.   Neurological:      General: No focal deficit present.      Mental Status: He is alert and oriented to person, place, and time.      Cranial Nerves: No cranial nerve deficit.      Deep Tendon Reflexes: Reflexes are normal and symmetric.   Psychiatric:         Mood and Affect: Mood normal.         Judgment: Judgment normal.         Lab Visit on 01/25/2024   Component Date Value    WBC 01/25/2024 5.54     RBC 01/25/2024 4.43 (L)     Hemoglobin 01/25/2024 12.9 (L)     Hematocrit 01/25/2024 39.6 (L)     MCV 01/25/2024 89     MCH 01/25/2024 29.1     MCHC 01/25/2024 32.6     RDW 01/25/2024 13.9     Platelets 01/25/2024 310     MPV 01/25/2024 10.3     Immature Granulocytes 01/25/2024 0.2     Gran # (ANC) 01/25/2024 2.9     Immature Grans (Abs) 01/25/2024 0.01     Lymph # 01/25/2024 1.7     Mono # 01/25/2024 0.5     Eos # 01/25/2024 0.4     Baso # 01/25/2024  0.09     nRBC 01/25/2024 0     Gran % 01/25/2024 51.9     Lymph % 01/25/2024 30.0     Mono % 01/25/2024 9.4     Eosinophil % 01/25/2024 6.9     Basophil % 01/25/2024 1.6     Differential Method 01/25/2024 Automated     Sodium 01/25/2024 138     Potassium 01/25/2024 4.2     Chloride 01/25/2024 107     CO2 01/25/2024 26     Glucose 01/25/2024 97     BUN 01/25/2024 18     Creatinine 01/25/2024 1.0     Calcium 01/25/2024 9.2     Total Protein 01/25/2024 6.4     Albumin 01/25/2024 3.3 (L)     Total Bilirubin 01/25/2024 0.7     Alkaline Phosphatase 01/25/2024 46 (L)     AST 01/25/2024 14     ALT 01/25/2024 9 (L)     eGFR 01/25/2024 >60.0     Anion Gap 01/25/2024 5 (L)     Cholesterol 01/25/2024 181     Triglycerides 01/25/2024 51     HDL 01/25/2024 63     LDL Cholesterol 01/25/2024 107.8     HDL/Cholesterol Ratio 01/25/2024 34.8     Total Cholesterol/HDL Ra* 01/25/2024 2.9     Non-HDL Cholesterol 01/25/2024 118        Assessment:       1. Routine general medical examination at health care facility    2. Essential hypertension    3. Aortic atherosclerosis    4. Calcified granuloma of lung    5. History of melanoma    6. Exudative age-related macular degeneration of left eye with inactive choroidal neovascularization    7. Primary insomnia    8. Erectile dysfunction, unspecified erectile dysfunction type    9. Normocytic anemia        Plan:   Essential hypertension  Blood pressure is under good control.  We will continue the current regimen.  Will work on regular aerobic exercise and a low salt diet.        Aortic atherosclerosis  Blood pressure well controlled.  No statin at this juncture.    Calcified granuloma of lung  Stable on cxr in 2022.  No evidence of change over time.  No need for routine monitoring    History of melanoma  Continue routine follow up with dermatology  Routine general medical examination at health care facility    Essential hypertension    Aortic atherosclerosis    Calcified granuloma of  lung    History of melanoma  Comments:  Follow up with Dr. Adam as planned in April    Exudative age-related macular degeneration of left eye with inactive choroidal neovascularization    Primary insomnia  Comments:  start trazodone 50mg at bedtime, if not fully effective will let MD know for dose adjustment  Orders:  -     traZODone (DESYREL) 50 MG tablet; Take 1 tablet (50 mg total) by mouth every evening.  Dispense: 30 tablet; Refill: 3    Erectile dysfunction, unspecified erectile dysfunction type  Comments:  side effects with viagra.  will trial levitra  Orders:  -     vardenafiL (LEVITRA) 10 MG tablet; Take 1 tablet (10 mg total) by mouth daily as needed for Erectile Dysfunction.  Dispense: 30 tablet; Refill: 0    Normocytic anemia  Comments:  will repeat cbc in six months  Orders:  -     CBC Auto Differential; Future; Expected date: 07/30/2024          Follow up in about 6 months (around 8/1/2024) for HTN, with Brenna Ham.

## 2024-02-01 NOTE — PATIENT INSTRUCTIONS
Patient Education       Preventing Falls   The Basics   Written by the doctors and editors at [facility]   Am I at risk of falling? -- Your risk of falling increases as you grow older. That's because getting older can make it harder to walk steadily and keep your balance. Also, the effects of falls are more serious in older people.  Overall, 3 to 4 out of every 10 people over the age of 65 fall each year. Up to 75 percent of people who fracture a hip never recover to the point they were before they had their fracture. If you have fallen in the past, you are at higher risk of falling again.  Several things can increase your risk of a fall, including:  Illness  A change in the medicines you take  An unsafe or unfamiliar setting (for example, a room with rugs or furniture that might trip you, or an area you don't know well)  How can my doctor help me to avoid falling? -- Your doctor can talk to you about the following things:  Past falls - It is important to tell your doctor about any times you have fallen or almost fallen. He or she can then suggest ways to prevent another fall.  Your health conditions - Some health problems can put you at risk of falling. These include conditions that affect eyesight, hearing, muscle strength, or balance.  The medicines you take - Certain medicines can increase the risk of falling. These include some medicines that are used for sleeping problems, anxiety, high blood pressure, or depression. Adding new medicines, or changing doses of some medicines, can also affect your risk of falling.  The more your doctor knows about your situation, the better he or she will be able to help you. For example, if you fell because you have a condition that causes pain, your doctor might suggest treatments to deal with the pain. Or if one of your medicines is making you dizzy and more likely to fall, your doctor might switch you to a different medicine.  Is there anything I can do on my own? -- Yes. To  help keep from falling, you can:  Make your home safer - To avoid falling at home, get rid of things that might make you trip or slip. This might include furniture, electrical cords, clutter, and loose rugs (figure 1). Keep your home well-lit so that you can easily see where you are going. Avoid storing things in high places so you don't have to reach or climb.  Wear sturdy shoes that fit well - Wearing shoes with high heels or slippery soles, or shoes that are too loose, can lead to falls. Walking around in bare feet, or only socks, can also increase your risk of falling.  Take vitamin D pills - Taking vitamin D might lower the risk of falls in older people. This is because vitamin D helps make bones and muscles stronger. Your doctor can talk to you about whether you should take extra vitamin D, and how much.  Stay active - Exercising on a regular basis can help lower your risk of falling. It might also help prevent you from getting hurt if you do fall. It is best to do a few different activities that help with both strength and balance. There are many kinds of exercise that can be safe for older people. These include walking, swimming, and Dipesh Chi (a Chinese martial art that involves slow, gentle movements).  Use a cane, walker, and other safety devices - If your doctor recommends that you use a cane or walker, be sure that it's the right size and you know how to use it. There are other devices that might help you avoid falling, too. These include grab bars or a sturdy seat for the shower, non-slip bath mats, and hand rails or treads for the stairs (to prevent slipping).  If you worry that you could fall, there are also alarm buttons that let you call for help if you fall and can't get up.  What should I do if I fall? -- If you fall, see your doctor right away, even if you aren't hurt. Your doctor can try to figure out what caused you to fall, and how likely you are to fall again. He or she will do an exam and  talk to you about your health problems, medicines, and activities. Then he or she can suggest things you can do to avoid falling again.  Many older people have a hard time recovering after a fall. Doing things to prevent falling can help you to protect your health and independence.  All topics are updated as new evidence becomes available and our peer review process is complete.  This topic retrieved from [employer] on: Sep 21, 2021.  Topic 26437 Version 18.0  Release: 29.4.2 - C29.263  © 2021 UpToDate, Inc. and/or its affiliates. All rights reserved.  figure 1: How to avoid falling at home     This picture shows some of the things that can cause a fall in your home. Look around and remove any loose rugs, electrical cords, clutter, or furniture that could trip you.  Graphic 40107 Version 1.0    Consumer Information Use and Disclaimer   This information is not specific medical advice and does not replace information you receive from your health care provider. This is only a brief summary of general information. It does NOT include all information about conditions, illnesses, injuries, tests, procedures, treatments, therapies, discharge instructions or life-style choices that may apply to you. You must talk with your health care provider for complete information about your health and treatment options. This information should not be used to decide whether or not to accept your health care provider's advice, instructions or recommendations. Only your health care provider has the knowledge and training to provide advice that is right for you. The use of this information is governed by the [employer] End User License Agreement, available at https://www.[employer].WeGather/en/solutions/Bluetest/about/dorian.The use of [employer] content is governed by the [employer] Terms of Use. ©2021 UpToDate, Inc. All rights reserved.  Copyright   © 2021 UpToDate, Inc. and/or its affiliates. All rights reserved.

## 2024-02-01 NOTE — ACP (ADVANCE CARE PLANNING)
Discussed with patient.  He reports a living will and POA.  Will bring copies for scanning into the chart.

## 2024-02-07 ENCOUNTER — OFFICE VISIT (OUTPATIENT)
Dept: OPHTHALMOLOGY | Facility: CLINIC | Age: 89
End: 2024-02-07
Payer: MEDICARE

## 2024-02-07 DIAGNOSIS — Z96.1 PSEUDOPHAKIA OF BOTH EYES: ICD-10-CM

## 2024-02-07 DIAGNOSIS — H52.13 MYOPIA WITH ASTIGMATISM AND PRESBYOPIA, BILATERAL: ICD-10-CM

## 2024-02-07 DIAGNOSIS — H52.203 MYOPIA WITH ASTIGMATISM AND PRESBYOPIA, BILATERAL: ICD-10-CM

## 2024-02-07 DIAGNOSIS — H35.3222 EXUDATIVE AGE-RELATED MACULAR DEGENERATION OF LEFT EYE WITH INACTIVE CHOROIDAL NEOVASCULARIZATION: Primary | ICD-10-CM

## 2024-02-07 DIAGNOSIS — H35.3112 NONEXUDATIVE AGE-RELATED MACULAR DEGENERATION, RIGHT EYE, INTERMEDIATE DRY STAGE: ICD-10-CM

## 2024-02-07 DIAGNOSIS — H52.4 MYOPIA WITH ASTIGMATISM AND PRESBYOPIA, BILATERAL: ICD-10-CM

## 2024-02-07 PROCEDURE — 99999 PR PBB SHADOW E&M-EST. PATIENT-LVL II: CPT | Mod: PBBFAC,HCNC,, | Performed by: OPTOMETRIST

## 2024-02-07 PROCEDURE — 92004 COMPRE OPH EXAM NEW PT 1/>: CPT | Mod: HCNC,S$GLB,, | Performed by: OPTOMETRIST

## 2024-02-07 PROCEDURE — 92015 DETERMINE REFRACTIVE STATE: CPT | Mod: HCNC,S$GLB,, | Performed by: OPTOMETRIST

## 2024-02-07 NOTE — PROGRESS NOTES
HPI     Annual Exam            Comments: NP to [facility]  Patient here today for yearly eye exam          Comments    Vision changes since last eye exam?: Yes at distance and near  Wears PAL glasses full-time     Any eye pain today: No    Other ocular symptoms: No    Interested in contact lens fitting today? No    1. Wet AMD OS  S/p Avastin OS x3 4/29/21  2. Dry AMD OD  AREDS.AG  3. PVD OU  4. PCIOL OU  5. OPS OS  S/P yag cap OD 5/20/21          Last edited by Mary Gamboa, PCT on 2/7/2024  9:01 AM.            Assessment /Plan     For exam results, see Encounter Report.    Exudative age-related macular degeneration of left eye with inactive choroidal neovascularization  -     Ambulatory referral/consult to Ophthalmology; Future; Expected date: 02/14/2024  No obvious SRF, unable to do OCT today due to being out of service. Prevously seen Dr CUEVAS, RTC with Dr Lane for f/u.     Nonexudative age-related macular degeneration, right eye, intermediate dry stage  Lifestyle modifications discussed (AREDS, green leafy vegetables, avoiding smoking) Observe.     Pseudophakia of both eyes  Doing well. Observe.     Myopia with astigmatism and presbyopia, bilateral  Mrx provided full time. Monocular precautions discussed  Eyeglass Final Rx       Eyeglass Final Rx         Sphere Cylinder Axis Add    Right -1.00 +3.00 009 +2.75    Left -1.75 +3.25 169 +2.75      Type: PAL    Expiration Date: 2/7/2025                  RTC with Dr CUEVAS for retinal f/u, (lost to f/u) sooner if any changes to vision or worsening symptoms.

## 2024-02-26 ENCOUNTER — TELEPHONE (OUTPATIENT)
Dept: OPHTHALMOLOGY | Facility: CLINIC | Age: 89
End: 2024-02-26
Payer: MEDICARE

## 2024-02-26 NOTE — TELEPHONE ENCOUNTER
----- Message from Kaitlynn Vázquez sent at 2/26/2024  3:07 PM CST -----  Contact: Jose Garcia is calling to speak to the nurse regarding his last appointment on 02/07, the glasses is making him feel nauseated, please give him a call at  [phone]    Thanks  LJ

## 2024-03-08 ENCOUNTER — OFFICE VISIT (OUTPATIENT)
Dept: OPHTHALMOLOGY | Facility: CLINIC | Age: 89
End: 2024-03-08
Payer: MEDICARE

## 2024-03-08 DIAGNOSIS — Z96.1 PSEUDOPHAKIA OF BOTH EYES: ICD-10-CM

## 2024-03-08 DIAGNOSIS — H35.3112 NONEXUDATIVE AGE-RELATED MACULAR DEGENERATION, RIGHT EYE, INTERMEDIATE DRY STAGE: Primary | ICD-10-CM

## 2024-03-08 DIAGNOSIS — H35.3222 EXUDATIVE AGE-RELATED MACULAR DEGENERATION OF LEFT EYE WITH INACTIVE CHOROIDAL NEOVASCULARIZATION: ICD-10-CM

## 2024-03-08 PROCEDURE — 99999 PR PBB SHADOW E&M-EST. PATIENT-LVL II: CPT | Mod: PBBFAC,HCNC,, | Performed by: OPHTHALMOLOGY

## 2024-03-08 PROCEDURE — 1160F RVW MEDS BY RX/DR IN RCRD: CPT | Mod: HCNC,CPTII,S$GLB, | Performed by: OPHTHALMOLOGY

## 2024-03-08 PROCEDURE — 99214 OFFICE O/P EST MOD 30 MIN: CPT | Mod: HCNC,S$GLB,, | Performed by: OPHTHALMOLOGY

## 2024-03-08 PROCEDURE — 92134 CPTRZ OPH DX IMG PST SGM RTA: CPT | Mod: HCNC,S$GLB,, | Performed by: OPHTHALMOLOGY

## 2024-03-08 PROCEDURE — 1159F MED LIST DOCD IN RCRD: CPT | Mod: HCNC,CPTII,S$GLB, | Performed by: OPHTHALMOLOGY

## 2024-03-08 NOTE — PROGRESS NOTES
===============================  Date today is 3/8/2024  Jose Latham is a 89 y.o. male  Last visit [facility]: :3/10/2023   Last visit eye dept. Visit date not found    Corrected distance visual acuity was 20/30 in the right eye and CF at 3' in the left eye.  Tonometry       Tonometry (Applanation, 8:50 AM)         Right Left    Pressure 13 12                  Wearing Rx       Wearing Rx         Sphere Cylinder Axis Add    Right -1.25 +2.00 016 +2.75    Left -1.50 +2.25 171 +2.75      Age: 2yrs    Type: PAL              Wearing Rx #2         Sphere Cylinder Axis Add    Right -0.75 +2.75 008 +2.75    Left -1.50 +3.00 172 +2.75      Age: 3wks    Type: PAL                  Manifest Refraction       Manifest Refraction         Sphere Cylinder Axis Dist VA    Right -0.50 +2.00 015 20/30+    Left -1.75 +3.25 169 20/200-                  Not recorded       Chief Complaint   Patient presents with    Follow-up     Lost to follow up       HPI     Follow-up     Additional comments: Lost to follow up             Comments    States that his vision is stable and that he purchased new glasses but   they make him nauseous the second that he puts them on.      1. Wet AMD OS  S/p Avastin OS x3 4/29/21  2. Dry AMD OD  AREDS.AG  3. PVD OU  4. PCIOL OU  5. OPS OS  S/P yag cap OD 5/20/21             Last edited by Shantel Valladares on 3/8/2024  8:53 AM.      Problem List Items Addressed This Visit          Eye/Vision problems    Nonexudative age-related macular degeneration, right eye, intermediate dry stage - Primary    Relevant Orders    Posterior Segment OCT Retina-Both eyes (Completed)     Other Visit Diagnoses       Exudative age-related macular degeneration of left eye with inactive choroidal neovascularization        Relevant Orders    Posterior Segment OCT Retina-Both eyes (Completed)    Pseudophakia of both eyes              Instructed to call 24/7 for any worsening of vision, visual distortion or pain.  Check OU  independently daily.    Gave my office and personal cell phone number.  ________________  3/8/2024 today  Jose Latham    OD nonexudative SMD  OS inactive SRN  OCT stable OU  Discussed syfovre with patient but don't feel it is necessary  PCIOL OU  Geographic atrophy OU  No sign of bleeding     RTC 1 year  Instructed to call 24/7 for any worsening of vision or symptoms. Check OU daily.   Gave my office and cell phone number.    =============================

## 2024-03-25 DIAGNOSIS — I10 ESSENTIAL HYPERTENSION: ICD-10-CM

## 2024-03-25 NOTE — TELEPHONE ENCOUNTER
No care due was identified.  [facility] Embedded Care Due Messages. Reference number: [id number].   3/25/2024 1:03:14 AM CDT

## 2024-03-26 RX ORDER — LOSARTAN POTASSIUM 25 MG/1
25 TABLET ORAL
Qty: 90 TABLET | Refills: 3 | Status: SHIPPED | OUTPATIENT
Start: 2024-03-26

## 2024-03-26 NOTE — TELEPHONE ENCOUNTER
Refill Decision Note   Jose Latham  is requesting a refill authorization.  Brief Assessment and Rationale for Refill:  Approve     Medication Therapy Plan:         Comments:     Note composed:1:21 PM 03/26/2024             Appointments     Last Visit   2/1/2024 Ron Calle MD   Next Visit   Visit date not found Ron Calle MD

## 2024-04-28 DIAGNOSIS — F51.01 PRIMARY INSOMNIA: ICD-10-CM

## 2024-04-29 RX ORDER — TRAZODONE HYDROCHLORIDE 50 MG/1
50 TABLET ORAL NIGHTLY
Qty: 90 TABLET | Refills: 1 | Status: SHIPPED | OUTPATIENT
Start: 2024-04-29

## 2024-04-29 NOTE — TELEPHONE ENCOUNTER
Refill Routing Note   Medication(s) are not appropriate for processing by [facility] for the following reason(s):        New or recently adjusted medication    [facility] action(s):  Defer               Appointments  past 12m or future 3m with PCP    Date Provider   Last Visit   2/1/2024 Ron Calle MD   Next Visit   Visit date not found Ron Calle MD   ED visits in past 90 days: 0        Note composed:11:03 AM 04/29/2024

## 2024-07-22 ENCOUNTER — LAB VISIT (OUTPATIENT)
Dept: LAB | Facility: HOSPITAL | Age: 89
End: 2024-07-22
Attending: INTERNAL MEDICINE
Payer: MEDICARE

## 2024-07-22 DIAGNOSIS — D64.9 NORMOCYTIC ANEMIA: ICD-10-CM

## 2024-07-22 LAB
BASOPHILS # BLD AUTO: 0.1 K/UL (ref 0–0.2)
BASOPHILS NFR BLD: 1.7 % (ref 0–1.9)
DIFFERENTIAL METHOD BLD: ABNORMAL
EOSINOPHIL # BLD AUTO: 0.4 K/UL (ref 0–0.5)
EOSINOPHIL NFR BLD: 6.1 % (ref 0–8)
ERYTHROCYTE [DISTWIDTH] IN BLOOD BY AUTOMATED COUNT: 14.2 % (ref 11.5–14.5)
HCT VFR BLD AUTO: 41.8 % (ref 40–54)
HGB BLD-MCNC: 13.1 G/DL (ref 14–18)
IMM GRANULOCYTES # BLD AUTO: 0.02 K/UL (ref 0–0.04)
IMM GRANULOCYTES NFR BLD AUTO: 0.3 % (ref 0–0.5)
LYMPHOCYTES # BLD AUTO: 1.9 K/UL (ref 1–4.8)
LYMPHOCYTES NFR BLD: 32.1 % (ref 18–48)
MCH RBC QN AUTO: 28.4 PG (ref 27–31)
MCHC RBC AUTO-ENTMCNC: 31.3 G/DL (ref 32–36)
MCV RBC AUTO: 91 FL (ref 82–98)
MONOCYTES # BLD AUTO: 0.5 K/UL (ref 0.3–1)
MONOCYTES NFR BLD: 7.9 % (ref 4–15)
NEUTROPHILS # BLD AUTO: 3.1 K/UL (ref 1.8–7.7)
NEUTROPHILS NFR BLD: 51.9 % (ref 38–73)
NRBC BLD-RTO: 0 /100 WBC
PLATELET # BLD AUTO: 287 K/UL (ref 150–450)
PMV BLD AUTO: 10.2 FL (ref 9.2–12.9)
RBC # BLD AUTO: 4.61 M/UL (ref 4.6–6.2)
WBC # BLD AUTO: 5.92 K/UL (ref 3.9–12.7)

## 2024-07-22 PROCEDURE — 36415 COLL VENOUS BLD VENIPUNCTURE: CPT | Mod: HCNC,PO | Performed by: INTERNAL MEDICINE

## 2024-07-22 PROCEDURE — 85025 COMPLETE CBC W/AUTO DIFF WBC: CPT | Mod: HCNC | Performed by: INTERNAL MEDICINE

## 2024-08-06 ENCOUNTER — OFFICE VISIT (OUTPATIENT)
Dept: INTERNAL MEDICINE | Facility: CLINIC | Age: 89
End: 2024-08-06
Payer: MEDICARE

## 2024-08-06 VITALS
BODY MASS INDEX: 26.45 KG/M2 | TEMPERATURE: 98 F | SYSTOLIC BLOOD PRESSURE: 138 MMHG | DIASTOLIC BLOOD PRESSURE: 78 MMHG | HEART RATE: 74 BPM | WEIGHT: 181.44 LBS | OXYGEN SATURATION: 96 %

## 2024-08-06 DIAGNOSIS — I10 ESSENTIAL HYPERTENSION: Primary | ICD-10-CM

## 2024-08-06 DIAGNOSIS — Z85.820 HISTORY OF MELANOMA: ICD-10-CM

## 2024-08-06 DIAGNOSIS — J84.10 CALCIFIED GRANULOMA OF LUNG: ICD-10-CM

## 2024-08-06 DIAGNOSIS — D64.9 NORMOCYTIC ANEMIA: ICD-10-CM

## 2024-08-06 DIAGNOSIS — F51.01 PRIMARY INSOMNIA: ICD-10-CM

## 2024-08-06 PROCEDURE — 99999 PR PBB SHADOW E&M-EST. PATIENT-LVL III: CPT | Mod: PBBFAC,HCNC,, | Performed by: NURSE PRACTITIONER

## 2024-08-06 PROCEDURE — 1101F PT FALLS ASSESS-DOCD LE1/YR: CPT | Mod: HCNC,CPTII,S$GLB, | Performed by: NURSE PRACTITIONER

## 2024-08-06 PROCEDURE — 1160F RVW MEDS BY RX/DR IN RCRD: CPT | Mod: HCNC,CPTII,S$GLB, | Performed by: NURSE PRACTITIONER

## 2024-08-06 PROCEDURE — 99214 OFFICE O/P EST MOD 30 MIN: CPT | Mod: HCNC,S$GLB,, | Performed by: NURSE PRACTITIONER

## 2024-08-06 PROCEDURE — 3288F FALL RISK ASSESSMENT DOCD: CPT | Mod: HCNC,CPTII,S$GLB, | Performed by: NURSE PRACTITIONER

## 2024-08-06 PROCEDURE — 1159F MED LIST DOCD IN RCRD: CPT | Mod: HCNC,CPTII,S$GLB, | Performed by: NURSE PRACTITIONER

## 2024-10-21 ENCOUNTER — OFFICE VISIT (OUTPATIENT)
Dept: DERMATOLOGY | Facility: CLINIC | Age: 89
End: 2024-10-21
Payer: MEDICARE

## 2024-10-21 DIAGNOSIS — Z12.83 SCREENING, MALIGNANT NEOPLASM, SKIN: ICD-10-CM

## 2024-10-21 DIAGNOSIS — Z85.828 HISTORY OF SKIN CANCER: ICD-10-CM

## 2024-10-21 DIAGNOSIS — L90.5 SCAR CONDITIONS/SKIN FIBROSIS: Primary | ICD-10-CM

## 2024-10-21 DIAGNOSIS — L57.0 ACTINIC KERATOSES: ICD-10-CM

## 2024-10-21 DIAGNOSIS — L82.1 SEBORRHEIC KERATOSIS: ICD-10-CM

## 2024-10-21 PROCEDURE — 99999 PR PBB SHADOW E&M-EST. PATIENT-LVL III: CPT | Mod: PBBFAC,HCNC,, | Performed by: DERMATOLOGY

## 2024-10-21 PROCEDURE — 1126F AMNT PAIN NOTED NONE PRSNT: CPT | Mod: HCNC,CPTII,S$GLB, | Performed by: DERMATOLOGY

## 2024-10-21 PROCEDURE — 17004 DESTROY PREMAL LESIONS 15/>: CPT | Mod: HCNC,S$GLB,, | Performed by: DERMATOLOGY

## 2024-10-21 PROCEDURE — 1159F MED LIST DOCD IN RCRD: CPT | Mod: HCNC,CPTII,S$GLB, | Performed by: DERMATOLOGY

## 2024-10-21 PROCEDURE — 1160F RVW MEDS BY RX/DR IN RCRD: CPT | Mod: HCNC,CPTII,S$GLB, | Performed by: DERMATOLOGY

## 2024-10-21 PROCEDURE — 3288F FALL RISK ASSESSMENT DOCD: CPT | Mod: HCNC,CPTII,S$GLB, | Performed by: DERMATOLOGY

## 2024-10-21 PROCEDURE — 99213 OFFICE O/P EST LOW 20 MIN: CPT | Mod: 25,HCNC,S$GLB, | Performed by: DERMATOLOGY

## 2024-10-21 PROCEDURE — 1101F PT FALLS ASSESS-DOCD LE1/YR: CPT | Mod: HCNC,CPTII,S$GLB, | Performed by: DERMATOLOGY

## 2024-10-21 NOTE — PROGRESS NOTES
Subjective:      Patient ID:  Jose Latham is a 89 y.o. male who presents for   Chief Complaint   Patient presents with    Skin Check     UBSC. Pt states no concerns,      Hx of basal cell carcinoma on the left anterior thigh (s/p Mohs by Dr. Casey on 2/5/15), last seen on 4/3/23.  He c/o fleshy papule of the sternal chest. This is a high risk patient here to check for the development of new lesions.    Denies bleeding, tender, growing, or concerning lesions.             Review of Systems   Constitutional:  Negative for fever and chills.   Gastrointestinal:  Negative for nausea and vomiting.   Skin:  Positive for activity-related sunscreen use. Negative for daily sunscreen use and recent sunburn.   Hematologic/Lymphatic: Does not bruise/bleed easily.       Objective:   Physical Exam   Constitutional: He appears well-developed and well-nourished. No distress.   Neurological: He is alert and oriented to person, place, and time. He is not disoriented.   Psychiatric: He has a normal mood and affect.   Skin:   Areas Examined (abnormalities noted in diagram):   Scalp / Hair Palpated and Inspected  Head / Face Inspection Performed  Neck Inspection Performed  Chest / Axilla Inspection Performed  Abdomen Inspection Performed  Back Inspection Performed  RUE Inspected  LUE Inspection Performed  Nails and Digits Inspection Performed                     Diagram Legend     Erythematous scaling macule/papule c/w actinic keratosis       Vascular papule c/w angioma      Pigmented verrucoid papule/plaque c/w seborrheic keratosis      Yellow umbilicated papule c/w sebaceous hyperplasia      Irregularly shaped tan macule c/w lentigo     1-2 mm smooth white papules consistent with Milia      Movable subcutaneous cyst with punctum c/w epidermal inclusion cyst      Subcutaneous movable cyst c/w pilar cyst      Firm pink to brown papule c/w dermatofibroma      Pedunculated fleshy papule(s) c/w skin tag(s)      Evenly pigmented  macule c/w junctional nevus     Mildly variegated pigmented, slightly irregular-bordered macule c/w mildly atypical nevus      Flesh colored to evenly pigmented papule c/w intradermal nevus       Pink pearly papule/plaque c/w basal cell carcinoma      Erythematous hyperkeratotic cursted plaque c/w SCC      Surgical scar with no sign of skin cancer recurrence      Open and closed comedones      Inflammatory papules and pustules      Verrucoid papule consistent consistent with wart     Erythematous eczematous patches and plaques     Dystrophic onycholytic nail with subungual debris c/w onychomycosis     Umbilicated papule    Erythematous-base heme-crusted tan verrucoid plaque consistent with inflamed seborrheic keratosis     Erythematous Silvery Scaling Plaque c/w Psoriasis     See annotation      Assessment / Plan:        Scar conditions/skin fibrosis  Screening, malignant neoplasm, skin  History of skin cancer  Hx of NMSC of the left anterior thigh.  Exam limited today.  Upper body skin exam done today.  Patient denies concerning lesions currently.  Continue routine skin surveillance. Daily sunscreen advised.    Seborrheic keratosis  Reassurance given. Discussed diagnosis and that lesions are benign.  AAD handout given.     Actinic keratoses  Hypertrophic of the right helix.  If fails to improve, we will consider biopsy at this site.  The patient acknowledged understanding.    Cryosurgery Procedure Note    The patient is informed of the precancerous quality and need for treatment of these lesions. After risks, benefits and alternatives explained, including blistering, pain, hyper- and hypopigmentation, patient verbally consents to cryotherapy to precancerous lesions. Liquid nitrogen cryosurgery is applied to the 25 actinic keratoses, as detailed in the physical exam, to produce a freeze injury. The patient is aware that blisters may form and is instructed on wound care with gentle cleansing and use of vaseline  ointment to keep moist until healed. The patient is supplied a handout on cryosurgery and is instructed to call if lesions do not completely resolve.             Follow up in about 3 months (around 1/21/2025).

## 2024-10-21 NOTE — PATIENT INSTRUCTIONS

## 2024-12-12 ENCOUNTER — OFFICE VISIT (OUTPATIENT)
Dept: INTERNAL MEDICINE | Facility: CLINIC | Age: 89
End: 2024-12-12
Payer: MEDICARE

## 2024-12-12 VITALS
BODY MASS INDEX: 25.31 KG/M2 | WEIGHT: 170.88 LBS | DIASTOLIC BLOOD PRESSURE: 78 MMHG | HEIGHT: 69 IN | TEMPERATURE: 98 F | HEART RATE: 103 BPM | OXYGEN SATURATION: 95 % | SYSTOLIC BLOOD PRESSURE: 130 MMHG

## 2024-12-12 DIAGNOSIS — M25.561 CHRONIC PAIN OF BOTH KNEES: Primary | ICD-10-CM

## 2024-12-12 DIAGNOSIS — M25.562 CHRONIC PAIN OF BOTH KNEES: Primary | ICD-10-CM

## 2024-12-12 DIAGNOSIS — G89.29 CHRONIC PAIN OF BOTH KNEES: Primary | ICD-10-CM

## 2024-12-12 DIAGNOSIS — I10 ESSENTIAL HYPERTENSION: ICD-10-CM

## 2024-12-12 DIAGNOSIS — Z23 NEED FOR VACCINATION: ICD-10-CM

## 2024-12-12 PROCEDURE — 99999 PR PBB SHADOW E&M-EST. PATIENT-LVL IV: CPT | Mod: PBBFAC,HCNC,, | Performed by: INTERNAL MEDICINE

## 2024-12-12 RX ORDER — MELOXICAM 15 MG/1
15 TABLET ORAL DAILY
Qty: 30 TABLET | Refills: 3 | Status: SHIPPED | OUTPATIENT
Start: 2024-12-12

## 2024-12-12 NOTE — PATIENT INSTRUCTIONS
Recommend getting the RSV, covid vaccines.  Your insurance requires you get them at the pharmacy.      Patient Education       Mediterranean Diet   About this topic   This is a heart healthy diet. It is based on widely used foods and cooking styles from many countries around the [place]. The main pattern for the diet is more plant foods and monounsaturated fats, or good fats, like olive oil. Protein in this diet comes from seafood, legumes, nuts, seeds, and poultry and eggs in lowered amounts. You will also eat more whole grains, vegetables, and fruits and moderate amounts of alcohol are also included. This diet has less red meats, dairy products, and processed foods.       What will the results be?   Your diet will have less saturated fat, cholesterol, calories, sodium, and added sugars. Your diet will be higher in fiber. This will help to keep your blood sugar steady. This diet lowers the chance of heart disease and other health problems.  What lifestyle changes are needed?   If you do not often eat this way, you will need to change your eating habits. Be sure to get regular exercise. It is believed to help the health benefits of this diet.  What changes to diet are needed?   You may need to limit the amount of red meat and processed foods in your diet. Ask your dietitian for help planning meals that are right for you.  What foods are good to eat?   Plenty of fish and other seafood  Fresh, frozen, or canned fruits and vegetables  Nuts and nut butters and dried beans, lentils, or peas  Foods high in fiber like whole grains and whole grain products  Olive oil (good fat), peanut or canola oil, margarine, or spreads that list vegetable oil as the first ingredient and do not  contain trans fat or partially hydrogenated oil  Small amounts of poultry and eggs  What foods should be limited or avoided?   Red meats  Sweets, desserts, and processed foods  Butter, oils, and fats that contain trans fats or are  hydrogenated or partially hydrogenated  Gravies and sauces  What problems could happen?   Your weight may rise because your diet will be higher in fat from olive oil and nuts.  You may have lower iron levels. Be sure to eat foods rich in iron. Also, eat foods rich in vitamin C. This will help your body take in iron.  You may have lower calcium levels because you are eating less dairy products. Ask your doctor if you need to take a calcium supplement.  Wine is often thought of as part of a Mediterranean diet. It is not needed and you may choose not to include it. Avoid wine if you are prone to alcohol abuse or are pregnant. Also, avoid it if you are at risk for breast cancer, have liver problems, or have other illnesses that make it important for you to not have alcohol.  When do I need to call the doctor?   If you have any concerns about your diet  Where can I learn more?   Academy of Nutrition and Dietetics  http://www.eatright.org/resource/food/planning-and-prep/cooking-tips-and-trends/make-it-mediterranean   American Heart Association  https://www.heart.org/en/healthy-living/healthy-eating/eat-smart/nutrition-basics/mediterranean-diet   Last Reviewed Date   2021-10-11  Consumer Information Use and Disclaimer   This information is not specific medical advice and does not replace information you receive from your health care provider. This is only a brief summary of general information. It does NOT include all information about conditions, illnesses, injuries, tests, procedures, treatments, therapies, discharge instructions or life-style choices that may apply to you. You must talk with your health care provider for complete information about your health and treatment options. This information should not be used to decide whether or not to accept your health care providers advice, instructions or recommendations. Only your health care provider has the knowledge and training to provide advice that is right for  you.  Copyright   Copyright © 2021 [employer] and its affiliates and/or licensors. All rights reserved.

## 2024-12-12 NOTE — PROGRESS NOTES
"Subjective:       Patient ID: Jose Latham is a 89 y.o. male.    Chief Complaint: Knee Pain      HPI:  History of Present Illness    Patient presents today for evaluation of bilateral knee pain.    He reports bilateral knee pain. The right knee has been intermittently sore for approximately one year, with pain localized to the inner aspect. The left knee pain began a few weeks ago after moving heavy planter parts. Pain in both knees is exacerbated by activities such as dancing and playing golf. He describes occasional snapping sensation accompanied by pain in the left knee. The right knee pain interferes with sleep when the leg is bent. He denies any locking of the knees or history of trauma. He has been managing the pain with occasional aspirin use, but is uncertain about its effectiveness. He denies noticing any swelling or redness in the knees.    He reports an active lifestyle, including playing golf and dancing approximately twice per week, indicating regular use of his legs.    He is currently taking Lisart without any reported issues.    He expresses interest in receiving a flu vaccine during the visit. He inquires about the availability of the COVID-19 vaccine and reports a history of receiving all previous COVID-19 vaccinations. He denies any history of RSV vaccination and indicates no current interest in receiving it.         Review of Systems    Objective:   /78 (BP Location: Left arm, Patient Position: Sitting)   Pulse 103   Temp 97.5 °F (36.4 °C) (Tympanic)   Ht 5' 9" (1.753 m)   Wt 77.5 kg (170 lb 13.7 oz)   SpO2 95%   BMI 25.23 kg/m²      Physical Exam    No visits with results within 2 Week(s) from this visit.   Latest known visit with results is:   Lab Visit on 07/22/2024   Component Date Value    WBC 07/22/2024 5.92     RBC 07/22/2024 4.61     Hemoglobin 07/22/2024 13.1 (L)     Hematocrit 07/22/2024 41.8     MCV 07/22/2024 91     MCH 07/22/2024 28.4     MCHC 07/22/2024 31.3 (L)     " RDW 07/22/2024 14.2     Platelets 07/22/2024 287     MPV 07/22/2024 10.2     Immature Granulocytes 07/22/2024 0.3     Gran # (ANC) 07/22/2024 3.1     Immature Grans (Abs) 07/22/2024 0.02     Lymph # 07/22/2024 1.9     Mono # 07/22/2024 0.5     Eos # 07/22/2024 0.4     Baso # 07/22/2024 0.10     nRBC 07/22/2024 0     Gran % 07/22/2024 51.9     Lymph % 07/22/2024 32.1     Mono % 07/22/2024 7.9     Eosinophil % 07/22/2024 6.1     Basophil % 07/22/2024 1.7     Differential Method 07/22/2024 Automated        Assessment:       1. Chronic pain of both knees    2. Essential hypertension    3. Need for vaccination        Plan:   No problem-specific Assessment & Plan notes found for this encounter.    Jose was seen today for knee pain.    Diagnoses and all orders for this visit:    Chronic pain of both knees  -     meloxicam (MOBIC) 15 MG tablet; Take 1 tablet (15 mg total) by mouth once daily.  -     Ambulatory referral/consult to Physical/Occupational Therapy; Future    Essential hypertension    Need for vaccination  -     Influenza - Trivalent (Adjuvanted)      Assessment & Plan    KNEE PAIN:  - Assessed knee pain as likely due to wear and tear, potentially arthritis or tendonitis.  - Recommend anti-inflammatory medication and physical therapy to address discomfort and improve knee function.  - Started Meloxicam, daily.  - Referred to physical therapy for knee strengthening and alignment.    HYPERTENSION:  - Continued Lisinopril at current dose.    PREVENTIVE CARE:  - Explained RSV as a flu-like illness that can severely affect elderly and very young.  - Discussed availability of new COVID vaccine and RSV vaccine at pharmacy.  - Patient can obtain new COVID vaccine and RSV vaccine at pharmacy.  - Administered flu vaccine in office.    FOLLOW-UP:  - Contact office if issues persist after completing prescribed treatment plan.         Follow up if symptoms worsen or fail to improve.    This note was generated with the  assistance of ambient listening technology. Verbal consent was obtained by the patient and accompanying visitor(s) for the recording of patient appointment to facilitate this note

## 2025-01-15 ENCOUNTER — OFFICE VISIT (OUTPATIENT)
Dept: INTERNAL MEDICINE | Facility: CLINIC | Age: OVER 89
End: 2025-01-15
Payer: MEDICARE

## 2025-01-15 VITALS
WEIGHT: 190.25 LBS | BODY MASS INDEX: 28.1 KG/M2 | DIASTOLIC BLOOD PRESSURE: 84 MMHG | SYSTOLIC BLOOD PRESSURE: 118 MMHG | TEMPERATURE: 98 F | OXYGEN SATURATION: 97 % | HEART RATE: 110 BPM

## 2025-01-15 DIAGNOSIS — M25.561 CHRONIC PAIN OF BOTH KNEES: Primary | ICD-10-CM

## 2025-01-15 DIAGNOSIS — M25.562 CHRONIC PAIN OF BOTH KNEES: Primary | ICD-10-CM

## 2025-01-15 DIAGNOSIS — G89.29 CHRONIC PAIN OF BOTH KNEES: Primary | ICD-10-CM

## 2025-01-15 PROCEDURE — 1159F MED LIST DOCD IN RCRD: CPT | Mod: CPTII,S$GLB,, | Performed by: INTERNAL MEDICINE

## 2025-01-15 PROCEDURE — 99213 OFFICE O/P EST LOW 20 MIN: CPT | Mod: S$GLB,,, | Performed by: INTERNAL MEDICINE

## 2025-01-15 PROCEDURE — 1125F AMNT PAIN NOTED PAIN PRSNT: CPT | Mod: CPTII,S$GLB,, | Performed by: INTERNAL MEDICINE

## 2025-01-15 PROCEDURE — 3288F FALL RISK ASSESSMENT DOCD: CPT | Mod: CPTII,S$GLB,, | Performed by: INTERNAL MEDICINE

## 2025-01-15 PROCEDURE — 99999 PR PBB SHADOW E&M-EST. PATIENT-LVL IV: CPT | Mod: PBBFAC,,, | Performed by: INTERNAL MEDICINE

## 2025-01-15 PROCEDURE — 1160F RVW MEDS BY RX/DR IN RCRD: CPT | Mod: CPTII,S$GLB,, | Performed by: INTERNAL MEDICINE

## 2025-01-15 PROCEDURE — 1101F PT FALLS ASSESS-DOCD LE1/YR: CPT | Mod: CPTII,S$GLB,, | Performed by: INTERNAL MEDICINE

## 2025-01-15 NOTE — PROGRESS NOTES
Subjective:       Patient ID: Jose Latham is a 89 y.o. male.    Chief Complaint: Knee Pain      HPI:  History of Present Illness    Patient presents today for left knee pain.    He reports severe left knee pain that prevents him from lying on his right side due to pressure on the left knee. The pain is localized to the left knee. He is unable to complete physical therapy exercises involving pushing or pulling motions due to pain severity, despite only being skilled nursing through the therapy course. Ultrasound showed evidence of damage.    He denies any current issues with the right knee, which has improved significantly with physical therapy. Ultrasound showed evidence of damage.    He reports being able to perform daily activities without significant limitations.         Review of Systems    Objective:   /84   Pulse 110   Temp 97.5 °F (36.4 °C) (Tympanic)   Wt 86.3 kg (190 lb 4.1 oz)   SpO2 97%   BMI 28.10 kg/m²      Physical Exam  Constitutional:       General: He is not in acute distress.     Appearance: Normal appearance. He is well-developed. He is not ill-appearing.   HENT:      Head: Normocephalic and atraumatic.   Eyes:      Extraocular Movements: Extraocular movements intact.   Pulmonary:      Effort: Pulmonary effort is normal. No respiratory distress.   Musculoskeletal:      Cervical back: Normal range of motion.      Comments: Tenderness to palpation along the medial joint line of the left knee   Skin:     Coloration: Skin is not jaundiced or pale.      Findings: No rash.   Neurological:      General: No focal deficit present.      Mental Status: He is alert and oriented to person, place, and time.      Cranial Nerves: No cranial nerve deficit.   Psychiatric:         Behavior: Behavior normal.         Thought Content: Thought content normal.             Assessment:       1. Chronic pain of both knees        Plan:   No problem-specific Assessment & Plan notes found for this encounter.    Jose gupta  seen today for knee pain.    Diagnoses and all orders for this visit:    Chronic pain of both knees  -     X-ray Knee Ortho Bilateral; Future  -     Ambulatory referral/consult to Orthopedics; Future      Assessment & Plan    RIGHT KNEE PAIN:  - Assessed the patient's right knee pain, noting significant improvement.  - Ordered x-ray of right knee for comparison and to guide further treatment.  - Recommend pausing therapy for right knee.    LEFT KNEE PAIN:  - Assessed the patient's left knee pain, noting persistent issues.  - Performed physical exam, noting point tenderness in left knee.  - Ordered x-ray of left knee for comparison and to guide further treatment.  - Referred the patient to Orthopaedics for evaluation of left knee pain and potential interventions, including joint injection.  - Recommend pausing physical therapy for left knee until after orthopaedic consultation.    OTHER INSTRUCTIONS:  - Instructed the patient to wait for x-ray paperwork before leaving the office.  - Informed the patient that the Orthopaedics department will call directly to schedule an appointment.         Follow up if symptoms worsen or fail to improve.    This note was generated with the assistance of ambient listening technology. Verbal consent was obtained by the patient and accompanying visitor(s) for the recording of patient appointment to facilitate this note

## 2025-01-16 ENCOUNTER — HOSPITAL ENCOUNTER (OUTPATIENT)
Dept: RADIOLOGY | Facility: HOSPITAL | Age: OVER 89
Discharge: HOME | End: 2025-01-16
Attending: INTERNAL MEDICINE
Payer: MEDICARE

## 2025-01-16 DIAGNOSIS — M25.562 CHRONIC PAIN OF BOTH KNEES: ICD-10-CM

## 2025-01-16 DIAGNOSIS — M25.561 CHRONIC PAIN OF BOTH KNEES: ICD-10-CM

## 2025-01-16 DIAGNOSIS — G89.29 CHRONIC PAIN OF BOTH KNEES: ICD-10-CM

## 2025-01-16 PROCEDURE — 73562 X-RAY EXAM OF KNEE 3: CPT | Mod: 26,50,, | Performed by: RADIOLOGY

## 2025-01-16 PROCEDURE — 73562 X-RAY EXAM OF KNEE 3: CPT | Mod: TC,50,FY,PO

## 2025-01-24 ENCOUNTER — HOSPITAL ENCOUNTER (INPATIENT)
Facility: HOSPITAL | Age: OVER 89
LOS: 4 days | Discharge: HOME | DRG: 291 | End: 2025-01-28
Attending: EMERGENCY MEDICINE | Admitting: INTERNAL MEDICINE
Payer: MEDICARE

## 2025-01-24 DIAGNOSIS — I50.21 ACUTE SYSTOLIC HEART FAILURE: ICD-10-CM

## 2025-01-24 DIAGNOSIS — I48.91 ATRIAL FIBRILLATION WITH RAPID VENTRICULAR RESPONSE: Primary | ICD-10-CM

## 2025-01-24 DIAGNOSIS — I48.91 ATRIAL FIBRILLATION WITH RVR: ICD-10-CM

## 2025-01-24 DIAGNOSIS — I50.9 ACUTE CONGESTIVE HEART FAILURE, UNSPECIFIED HEART FAILURE TYPE: ICD-10-CM

## 2025-01-24 DIAGNOSIS — R06.02 SOB (SHORTNESS OF BREATH): ICD-10-CM

## 2025-01-24 DIAGNOSIS — I48.19 PERSISTENT ATRIAL FIBRILLATION: ICD-10-CM

## 2025-01-24 DIAGNOSIS — R07.9 CHEST PAIN: ICD-10-CM

## 2025-01-24 DIAGNOSIS — R79.89 TROPONIN LEVEL ELEVATED: ICD-10-CM

## 2025-01-24 LAB
ALBUMIN SERPL BCP-MCNC: 3.9 G/DL (ref 3.5–5.2)
ALP SERPL-CCNC: 67 U/L (ref 40–150)
ALT SERPL W/O P-5'-P-CCNC: 29 U/L (ref 10–44)
ANION GAP SERPL CALC-SCNC: 12 MMOL/L (ref 8–16)
AORTIC ROOT ANNULUS: 3.83 CM
APTT PPP: 24.5 SEC (ref 21–32)
ASCENDING AORTA: 3.13 CM
AST SERPL-CCNC: 24 U/L (ref 10–40)
AV INDEX (PROSTH): 0.64
AV MEAN GRADIENT: 3 MMHG
AV PEAK GRADIENT: 4 MMHG
AV REGURGITATION PRESSURE HALF TIME: 316 MS
AV VALVE AREA BY VELOCITY RATIO: 2.4 CM²
AV VALVE AREA: 2.2 CM²
AV VELOCITY RATIO: 0.7
BASOPHILS # BLD AUTO: 0.1 K/UL (ref 0–0.2)
BASOPHILS NFR BLD: 1.3 % (ref 0–1.9)
BILIRUB SERPL-MCNC: 1 MG/DL (ref 0.1–1)
BILIRUB UR QL STRIP: NEGATIVE
BNP SERPL-MCNC: 873 PG/ML (ref 0–99)
BSA FOR ECHO PROCEDURE: 2.14 M2
BUN SERPL-MCNC: 19 MG/DL (ref 8–23)
CALCIUM SERPL-MCNC: 9.5 MG/DL (ref 8.7–10.5)
CHLORIDE SERPL-SCNC: 107 MMOL/L (ref 95–110)
CLARITY UR: CLEAR
CO2 SERPL-SCNC: 21 MMOL/L (ref 23–29)
COLOR UR: YELLOW
CREAT SERPL-MCNC: 1.2 MG/DL (ref 0.5–1.4)
CV ECHO LV RWT: 0.31 CM
DIFFERENTIAL METHOD BLD: NORMAL
DOP CALC AO PEAK VEL: 1 M/S
DOP CALC AO VTI: 18.5 CM
DOP CALC LVOT AREA: 3.5 CM2
DOP CALC LVOT DIAMETER: 2.1 CM
DOP CALC LVOT PEAK VEL: 0.7 M/S
DOP CALC LVOT STROKE VOLUME: 40.8 CM3
DOP CALC RVOT PEAK VEL: 0.52 M/S
DOP CALC RVOT VTI: 7.6 CM
DOP CALCLVOT PEAK VEL VTI: 11.8 CM
E WAVE DECELERATION TIME: 174 MSEC
E/A RATIO: 3.31
E/E' RATIO: 10 M/S
ECHO LV POSTERIOR WALL: 0.8 CM (ref 0.6–1.1)
EOSINOPHIL # BLD AUTO: 0.2 K/UL (ref 0–0.5)
EOSINOPHIL NFR BLD: 1.9 % (ref 0–8)
ERYTHROCYTE [DISTWIDTH] IN BLOOD BY AUTOMATED COUNT: 14.4 % (ref 11.5–14.5)
EST. GFR  (NO RACE VARIABLE): 57 ML/MIN/1.73 M^2
FRACTIONAL SHORTENING: 15.7 % (ref 28–44)
GLUCOSE SERPL-MCNC: 103 MG/DL (ref 70–110)
GLUCOSE UR QL STRIP: NEGATIVE
HCT VFR BLD AUTO: 43.9 % (ref 40–54)
HCV AB SERPL QL IA: NEGATIVE
HEP C VIRUS HOLD SPECIMEN: NORMAL
HGB BLD-MCNC: 14.2 G/DL (ref 14–18)
HGB UR QL STRIP: NEGATIVE
HIV 1+2 AB+HIV1 P24 AG SERPL QL IA: NEGATIVE
IMM GRANULOCYTES # BLD AUTO: 0.02 K/UL (ref 0–0.04)
IMM GRANULOCYTES NFR BLD AUTO: 0.3 % (ref 0–0.5)
INFLUENZA A, MOLECULAR: NEGATIVE
INFLUENZA B, MOLECULAR: NEGATIVE
INR PPP: 1 (ref 0.8–1.2)
INTERVENTRICULAR SEPTUM: 0.9 CM (ref 0.6–1.1)
IVRT: 94 MSEC
KETONES UR QL STRIP: NEGATIVE
LA MAJOR: 7.3 CM
LA MINOR: 6.4 CM
LA WIDTH: 5.4 CM
LEFT ATRIUM AREA SYSTOLIC (APICAL 2 CHAMBER): 31.01 CM2
LEFT ATRIUM AREA SYSTOLIC (APICAL 4 CHAMBER): 33.44 CM2
LEFT ATRIUM SIZE: 4.7 CM
LEFT ATRIUM VOLUME INDEX MOD: 61 ML/M2
LEFT ATRIUM VOLUME INDEX: 69 ML/M2
LEFT ATRIUM VOLUME MOD: 130 ML
LEFT ATRIUM VOLUME: 147 CM3
LEFT INTERNAL DIMENSION IN SYSTOLE: 4.3 CM (ref 2.1–4)
LEFT VENTRICLE DIASTOLIC VOLUME INDEX: 57.42 ML/M2
LEFT VENTRICLE DIASTOLIC VOLUME: 121.72 ML
LEFT VENTRICLE END SYSTOLIC VOLUME APICAL 2 CHAMBER: 121.99 ML
LEFT VENTRICLE END SYSTOLIC VOLUME APICAL 4 CHAMBER: 122.14 ML
LEFT VENTRICLE MASS INDEX: 71.6 G/M2
LEFT VENTRICLE SYSTOLIC VOLUME INDEX: 39.4 ML/M2
LEFT VENTRICLE SYSTOLIC VOLUME: 83.48 ML
LEFT VENTRICULAR INTERNAL DIMENSION IN DIASTOLE: 5.1 CM (ref 3.5–6)
LEFT VENTRICULAR MASS: 151.8 G
LEUKOCYTE ESTERASE UR QL STRIP: NEGATIVE
LV LATERAL E/E' RATIO: 9.6 M/S
LV SEPTAL E/E' RATIO: 10.8 M/S
LVED V (TEICH): 121.72 ML
LVES V (TEICH): 83.48 ML
LVOT MG: 0.99 MMHG
LVOT MV: 0.47 CM/S
LYMPHOCYTES # BLD AUTO: 1.9 K/UL (ref 1–4.8)
LYMPHOCYTES NFR BLD: 24 % (ref 18–48)
MAGNESIUM SERPL-MCNC: 2.1 MG/DL (ref 1.6–2.6)
MCH RBC QN AUTO: 28.8 PG (ref 27–31)
MCHC RBC AUTO-ENTMCNC: 32.3 G/DL (ref 32–36)
MCV RBC AUTO: 89 FL (ref 82–98)
MONOCYTES # BLD AUTO: 0.6 K/UL (ref 0.3–1)
MONOCYTES NFR BLD: 7.7 % (ref 4–15)
MV PEAK A VEL: 0.26 M/S
MV PEAK E VEL: 0.86 M/S
MV STENOSIS PRESSURE HALF TIME: 50.58 MS
MV VALVE AREA P 1/2 METHOD: 4.35 CM2
NEUTROPHILS # BLD AUTO: 5.1 K/UL (ref 1.8–7.7)
NEUTROPHILS NFR BLD: 64.8 % (ref 38–73)
NITRITE UR QL STRIP: NEGATIVE
NRBC BLD-RTO: 0 /100 WBC
OHS CV RV/LV RATIO: 0.76 CM
PH UR STRIP: 6 [PH] (ref 5–8)
PISA AR MAX VEL: 2.4 M/S
PISA TR MAX VEL: 2.7 M/S
PLATELET # BLD AUTO: 303 K/UL (ref 150–450)
PMV BLD AUTO: 9.9 FL (ref 9.2–12.9)
POTASSIUM SERPL-SCNC: 3.8 MMOL/L (ref 3.5–5.1)
PROT SERPL-MCNC: 7.4 G/DL (ref 6–8.4)
PROT UR QL STRIP: NEGATIVE
PROTHROMBIN TIME: 11.2 SEC (ref 9–12.5)
PULM VEIN S/D RATIO: 0.38
PV MEAN GRADIENT: 1 MMHG
PV MV: 0.49 M/S
PV PEAK D VEL: 0.63 M/S
PV PEAK GRADIENT: 2 MMHG
PV PEAK S VEL: 0.24 M/S
PV PEAK VELOCITY: 0.63 M/S
RA MAJOR: 5.81 CM
RA PRESSURE ESTIMATED: 3 MMHG
RA WIDTH: 4.6 CM
RBC # BLD AUTO: 4.93 M/UL (ref 4.6–6.2)
RIGHT VENTRICLE DIASTOLIC BASEL DIMENSION: 3.9 CM
RIGHT VENTRICULAR END-DIASTOLIC DIMENSION: 3.92 CM
RV TB RVSP: 6 MMHG
SARS-COV-2 RDRP RESP QL NAA+PROBE: NEGATIVE
SODIUM SERPL-SCNC: 140 MMOL/L (ref 136–145)
SP GR UR STRIP: 1.01 (ref 1–1.03)
SPECIMEN SOURCE: NORMAL
STJ: 3.29 CM
TDI LATERAL: 0.09 M/S
TDI SEPTAL: 0.08 M/S
TDI: 0.09 M/S
TR MAX PG: 29 MMHG
TRICUSPID ANNULAR PLANE SYSTOLIC EXCURSION: 1.3 CM
TROPONIN I SERPL DL<=0.01 NG/ML-MCNC: 0.13 NG/ML (ref 0–0.03)
TROPONIN I SERPL DL<=0.01 NG/ML-MCNC: 0.16 NG/ML (ref 0–0.03)
TROPONIN I SERPL DL<=0.01 NG/ML-MCNC: 0.2 NG/ML (ref 0–0.03)
TSH SERPL DL<=0.005 MIU/L-ACNC: 1.53 UIU/ML (ref 0.4–4)
TV REST PULMONARY ARTERY PRESSURE: 32 MMHG
URN SPEC COLLECT METH UR: NORMAL
UROBILINOGEN UR STRIP-ACNC: NEGATIVE EU/DL
WBC # BLD AUTO: 7.92 K/UL (ref 3.9–12.7)
Z-SCORE OF LEFT VENTRICULAR DIMENSION IN END DIASTOLE: -2.73
Z-SCORE OF LEFT VENTRICULAR DIMENSION IN END SYSTOLE: 0.4

## 2025-01-24 PROCEDURE — 87635 SARS-COV-2 COVID-19 AMP PRB: CPT | Performed by: EMERGENCY MEDICINE

## 2025-01-24 PROCEDURE — 96375 TX/PRO/DX INJ NEW DRUG ADDON: CPT

## 2025-01-24 PROCEDURE — 93010 ELECTROCARDIOGRAM REPORT: CPT | Mod: ,,, | Performed by: INTERNAL MEDICINE

## 2025-01-24 PROCEDURE — 86803 HEPATITIS C AB TEST: CPT | Performed by: EMERGENCY MEDICINE

## 2025-01-24 PROCEDURE — 25000003 PHARM REV CODE 250: Performed by: INTERNAL MEDICINE

## 2025-01-24 PROCEDURE — 84484 ASSAY OF TROPONIN QUANT: CPT | Performed by: INTERNAL MEDICINE

## 2025-01-24 PROCEDURE — 85610 PROTHROMBIN TIME: CPT | Performed by: EMERGENCY MEDICINE

## 2025-01-24 PROCEDURE — 63600175 PHARM REV CODE 636 W HCPCS: Performed by: EMERGENCY MEDICINE

## 2025-01-24 PROCEDURE — 83880 ASSAY OF NATRIURETIC PEPTIDE: CPT | Performed by: EMERGENCY MEDICINE

## 2025-01-24 PROCEDURE — 36415 COLL VENOUS BLD VENIPUNCTURE: CPT | Performed by: EMERGENCY MEDICINE

## 2025-01-24 PROCEDURE — 81003 URINALYSIS AUTO W/O SCOPE: CPT | Performed by: INTERNAL MEDICINE

## 2025-01-24 PROCEDURE — 99291 CRITICAL CARE FIRST HOUR: CPT

## 2025-01-24 PROCEDURE — 93005 ELECTROCARDIOGRAM TRACING: CPT

## 2025-01-24 PROCEDURE — 84443 ASSAY THYROID STIM HORMONE: CPT | Performed by: EMERGENCY MEDICINE

## 2025-01-24 PROCEDURE — 84484 ASSAY OF TROPONIN QUANT: CPT | Mod: 91 | Performed by: EMERGENCY MEDICINE

## 2025-01-24 PROCEDURE — 21400001 HC TELEMETRY ROOM

## 2025-01-24 PROCEDURE — 96374 THER/PROPH/DIAG INJ IV PUSH: CPT

## 2025-01-24 PROCEDURE — 83735 ASSAY OF MAGNESIUM: CPT | Performed by: EMERGENCY MEDICINE

## 2025-01-24 PROCEDURE — 36415 COLL VENOUS BLD VENIPUNCTURE: CPT | Performed by: INTERNAL MEDICINE

## 2025-01-24 PROCEDURE — 85730 THROMBOPLASTIN TIME PARTIAL: CPT | Performed by: EMERGENCY MEDICINE

## 2025-01-24 PROCEDURE — 87389 HIV-1 AG W/HIV-1&-2 AB AG IA: CPT | Performed by: EMERGENCY MEDICINE

## 2025-01-24 PROCEDURE — 80053 COMPREHEN METABOLIC PANEL: CPT | Performed by: EMERGENCY MEDICINE

## 2025-01-24 PROCEDURE — 85025 COMPLETE CBC W/AUTO DIFF WBC: CPT | Performed by: EMERGENCY MEDICINE

## 2025-01-24 PROCEDURE — 87502 INFLUENZA DNA AMP PROBE: CPT | Performed by: EMERGENCY MEDICINE

## 2025-01-24 PROCEDURE — 25000003 PHARM REV CODE 250: Performed by: EMERGENCY MEDICINE

## 2025-01-24 RX ORDER — ONDANSETRON HYDROCHLORIDE 2 MG/ML
4 INJECTION, SOLUTION INTRAVENOUS
Status: DISPENSED | OUTPATIENT
Start: 2025-01-24 | End: 2025-01-24

## 2025-01-24 RX ORDER — IBUPROFEN 200 MG
16 TABLET ORAL
Status: DISCONTINUED | OUTPATIENT
Start: 2025-01-24 | End: 2025-01-28 | Stop reason: HOSPADM

## 2025-01-24 RX ORDER — ASPIRIN 325 MG
325 TABLET ORAL
Status: COMPLETED | OUTPATIENT
Start: 2025-01-24 | End: 2025-01-24

## 2025-01-24 RX ORDER — DILTIAZEM HYDROCHLORIDE 5 MG/ML
20 INJECTION INTRAVENOUS
Status: COMPLETED | OUTPATIENT
Start: 2025-01-24 | End: 2025-01-24

## 2025-01-24 RX ORDER — NALOXONE HCL 0.4 MG/ML
0.02 VIAL (ML) INJECTION
Status: DISCONTINUED | OUTPATIENT
Start: 2025-01-24 | End: 2025-01-28 | Stop reason: HOSPADM

## 2025-01-24 RX ORDER — IBUPROFEN 200 MG
24 TABLET ORAL
Status: DISCONTINUED | OUTPATIENT
Start: 2025-01-24 | End: 2025-01-28 | Stop reason: HOSPADM

## 2025-01-24 RX ORDER — GLUCAGON 1 MG
1 KIT INJECTION
Status: DISCONTINUED | OUTPATIENT
Start: 2025-01-24 | End: 2025-01-28 | Stop reason: HOSPADM

## 2025-01-24 RX ORDER — SIMETHICONE 80 MG
1 TABLET,CHEWABLE ORAL 4 TIMES DAILY PRN
Status: DISCONTINUED | OUTPATIENT
Start: 2025-01-24 | End: 2025-01-28 | Stop reason: HOSPADM

## 2025-01-24 RX ORDER — ONDANSETRON HYDROCHLORIDE 2 MG/ML
4 INJECTION, SOLUTION INTRAVENOUS EVERY 8 HOURS PRN
Status: DISCONTINUED | OUTPATIENT
Start: 2025-01-24 | End: 2025-01-28 | Stop reason: HOSPADM

## 2025-01-24 RX ORDER — ACETAMINOPHEN 325 MG/1
650 TABLET ORAL EVERY 4 HOURS PRN
Status: DISCONTINUED | OUTPATIENT
Start: 2025-01-24 | End: 2025-01-28 | Stop reason: HOSPADM

## 2025-01-24 RX ORDER — NAPROXEN SODIUM 220 MG/1
81 TABLET, FILM COATED ORAL DAILY
Status: DISCONTINUED | OUTPATIENT
Start: 2025-01-25 | End: 2025-01-28 | Stop reason: HOSPADM

## 2025-01-24 RX ORDER — METOPROLOL TARTRATE 25 MG/1
25 TABLET, FILM COATED ORAL 2 TIMES DAILY
Status: DISCONTINUED | OUTPATIENT
Start: 2025-01-24 | End: 2025-01-27

## 2025-01-24 RX ORDER — FUROSEMIDE 10 MG/ML
60 INJECTION INTRAMUSCULAR; INTRAVENOUS
Status: COMPLETED | OUTPATIENT
Start: 2025-01-24 | End: 2025-01-24

## 2025-01-24 RX ORDER — SODIUM CHLORIDE 0.9 % (FLUSH) 0.9 %
10 SYRINGE (ML) INJECTION EVERY 12 HOURS PRN
Status: DISCONTINUED | OUTPATIENT
Start: 2025-01-24 | End: 2025-01-28 | Stop reason: HOSPADM

## 2025-01-24 RX ORDER — HYDROMORPHONE HYDROCHLORIDE 1 MG/ML
1 INJECTION, SOLUTION INTRAMUSCULAR; INTRAVENOUS; SUBCUTANEOUS
Status: DISPENSED | OUTPATIENT
Start: 2025-01-24 | End: 2025-01-24

## 2025-01-24 RX ORDER — ALUMINUM HYDROXIDE, MAGNESIUM HYDROXIDE, AND SIMETHICONE 1200; 120; 1200 MG/30ML; MG/30ML; MG/30ML
30 SUSPENSION ORAL 4 TIMES DAILY PRN
Status: DISCONTINUED | OUTPATIENT
Start: 2025-01-24 | End: 2025-01-28 | Stop reason: HOSPADM

## 2025-01-24 RX ORDER — TALC
6 POWDER (GRAM) TOPICAL NIGHTLY PRN
Status: DISCONTINUED | OUTPATIENT
Start: 2025-01-24 | End: 2025-01-28 | Stop reason: HOSPADM

## 2025-01-24 RX ADMIN — DILTIAZEM HYDROCHLORIDE 20 MG: 5 INJECTION INTRAVENOUS at 09:01

## 2025-01-24 RX ADMIN — METOPROLOL TARTRATE 25 MG: 25 TABLET, FILM COATED ORAL at 12:01

## 2025-01-24 RX ADMIN — METOPROLOL TARTRATE 25 MG: 25 TABLET, FILM COATED ORAL at 08:01

## 2025-01-24 RX ADMIN — ASPIRIN 325 MG ORAL TABLET 325 MG: 325 PILL ORAL at 10:01

## 2025-01-24 RX ADMIN — FUROSEMIDE 60 MG: 10 INJECTION, SOLUTION INTRAMUSCULAR; INTRAVENOUS at 09:01

## 2025-01-24 NOTE — H&P
[facility] - Emergency Dept.  [facility]  History & Physical    Patient Name: Jose Latham  MRN: [id number]  Patient Class: IP- Inpatient  Admission Date: 1/24/2025  Attending Physician: Sean Vázquez MD   Primary Care Provider: Ron Calle MD         Patient information was obtained from patient, past medical records, and ER records.     Subjective:     Principal Problem:Atrial fibrillation    Chief Complaint:   Chief Complaint   Patient presents with    Shortness of Breath     Patient presents to ED with c/o SOB x 1 week. Denies fever. Denies chest pain.        HPI: The patient is a 89 yo male with past medical history of arthritis, hypertension and melanoma who presented to the ED with shortness of breath. He reports symptoms started a week before presentation. He reports he is pretty active. He plays golf and dances on a regular basis. He had some cold symptoms but that has resolved. He denies chest pain. Workup in the ED revealed atrial fibrillation with RVR. Cardiology and hospital medicine consulted.     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    Melanoma     left leg, removed years ago    Nuclear sclerosis - Both Eyes 8/21/2013       Past Surgical History:   Procedure Laterality Date    ADENOIDECTOMY      CATARACT EXTRACTION W/  INTRAOCULAR LENS IMPLANT Right 05/16/2018        CATARACT EXTRACTION W/  INTRAOCULAR LENS IMPLANT Left 07/18/2018        INTRAOCULAR PROSTHESES INSERTION Left 7/18/2018    Procedure: INSERTION, INTRAOCULAR LENS PROSTHESIS;  Surgeon: Mari Dobson MD;  Location: [facility] OR [address] [place] [address];  Service: Ophthalmology;  Laterality: Left;    MALIGNANT SKIN LESION EXCISION      PHACOEMULSIFICATION OF CATARACT Left 7/18/2018    Procedure: PHACOEMULSIFICATION, CATARACT;  Surgeon: Mari Dobson MD;  Location: [facility] OR [address] [place] [address];  Service: Ophthalmology;  Laterality:  Left;    TONSILLECTOMY      YAG CAPSULOTOMY Right 2021           Review of patient's allergies indicates:  No Known Allergies    No current facility-administered medications on file prior to encounter.     Current Outpatient Medications on File Prior to Encounter   Medication Sig    aspirin 81 MG Chew Take 81 mg by mouth once daily.    losartan (COZAAR) 25 MG tablet TAKE 1 TABLET BY MOUTH EVERY DAY    melatonin 3 mg Tab Take 3 mg by mouth every evening.     meloxicam (MOBIC) 15 MG tablet Take 1 tablet (15 mg total) by mouth once daily.    multivit-min-FA-lycopen-lutein 0.4-300-250 mg-mcg-mcg Tab Take 1 tablet by mouth once daily.      Family History       Problem Relation (Age of Onset)    Alcohol abuse Father    Aneurysm Sister    Cancer Sister    Heart disease Sister    Hypertension Sister    No Known Problems Mother, Maternal Aunt, Maternal Uncle, Paternal Aunt, Paternal Uncle, Maternal Grandmother, Maternal Grandfather, Paternal Grandmother, Paternal Grandfather, Son    Stomach cancer Sister    Stroke Sister          Tobacco Use    Smoking status: Former     Current packs/day: 0.00     Average packs/day: 1 pack/day for 15.0 years (15.0 ttl pk-yrs)     Types: Cigarettes     Start date: 5/15/1949     Quit date: 5/15/1964     Years since quittin.7     Passive exposure: Past    Smokeless tobacco: Never   Substance and Sexual Activity    Alcohol use: Yes     Alcohol/week: 2.0 standard drinks of alcohol     Types: 2 Cans of beer per week    Drug use: No    Sexual activity: Not Currently     Partners: Female     Review of Systems   Respiratory:  Positive for shortness of breath. Negative for cough.    Cardiovascular:  Negative for chest pain and palpitations.   All other systems reviewed and are negative.    Objective:     Vital Signs (Most Recent):  Temp: 97.6 °F (36.4 °C) (25 0906)  Pulse: 100 (25 1112)  Resp: 20 (25 1112)  BP: (!) 143/88 (25 1112)  SpO2: (!) 93 %  (01/24/25 1112) Vital Signs (24h Range):  Temp:  [97.6 °F (36.4 °C)] 97.6 °F (36.4 °C)  Pulse:  [] 100  Resp:  [20-26] 20  SpO2:  [93 %-97 %] 93 %  BP: (106-149)/(68-89) 143/88     Weight: 89.9 kg (198 lb 3.2 oz)  Body mass index is 26.88 kg/m².     Physical Exam  HENT:      Head: Normocephalic and atraumatic.   Cardiovascular:      Rate and Rhythm: Tachycardia present. Rhythm irregular.      Heart sounds: No murmur heard.  Pulmonary:      Effort: Pulmonary effort is normal. No respiratory distress.      Breath sounds: Normal breath sounds. No wheezing.   Abdominal:      General: Bowel sounds are normal. There is no distension.      Palpations: Abdomen is soft.      Tenderness: There is no abdominal tenderness.   Musculoskeletal:         General: Swelling present.      Right lower leg: Edema present.      Left lower leg: Edema present.   Skin:     General: Skin is warm and dry.   Neurological:      Mental Status: He is alert and oriented to person, place, and time. Mental status is at baseline.                Significant Labs: All pertinent labs within the past 24 hours have been reviewed.  Recent Lab Results         01/24/25  0924   01/24/25  0912        Influenza A, Molecular Negative         Influenza B, Molecular Negative         Albumin   3.9       ALP   67       ALT   29       Anion Gap   12       PTT   24.5  Comment: Refer to local heparin nomogram for intensity/dose specific   therapeutic   range.         AST   24       Baso #   0.10       Basophil %   1.3       BILIRUBIN TOTAL   1.0  Comment: For infants and newborns, interpretation of results should be based  on gestational age, weight and in agreement with clinical  observations.    Premature Infant recommended reference ranges:  Up to 24 hours.............<8.0 mg/dL  Up to 48 hours............<12.0 mg/dL  3-5 days..................<15.0 mg/dL  6-29 days.................<15.0 mg/dL         BNP   873  Comment: Values of less than 100 pg/ml are  consistent with non-CHF populations.       BUN   19       Calcium   9.5       Chloride   107       CO2   21       Creatinine   1.2       Differential Method   Automated       eGFR   57       Eos #   0.2       Eos %   1.9       Flu A & B Source Nasal swab         Glucose   103       Gran # (ANC)   5.1       Gran %   64.8       Hematocrit   43.9       Hemoglobin   14.2       Hepatitis C Ab   Negative       HEP C Virus Hold Specimen   Hold for HCV sendout       HIV 1/2 Ag/Ab   Negative       Immature Grans (Abs)   0.02  Comment: Mild elevation in immature granulocytes is non specific and   can be seen in a variety of conditions including stress response,   acute inflammation, trauma and pregnancy. Correlation with other   laboratory and clinical findings is essential.         Immature Granulocytes   0.3       INR   1.0  Comment: Coumadin Therapy:  2.0 - 3.0 for INR for all indicators except mechanical heart valves  and antiphospholipid syndromes which should use 2.5 - 3.5.         Lymph #   1.9       Lymph %   24.0       Magnesium    2.1       MCH   28.8       MCHC   32.3       MCV   89       Mono #   0.6       Mono %   7.7       MPV   9.9       nRBC   0       Platelet Count   303       Potassium   3.8       PROTEIN TOTAL   7.4       PT   11.2       RBC   4.93       RDW   14.4       SARS-CoV-2 RNA, Amplification, Qual Negative  Comment: This test utilizes isothermal nucleic acid amplification technology   to   detect the SARS-CoV-2 RdRp nucleic acid segment. The analytical   sensitivity   (limit of detection) is 500 copies/swab.    A POSITIVE result is indicative of the presence of SARS-CoV-2 RNA;   clinical   correlation with patient history and other diagnostic information is   necessary to determine patient infection status.    A NEGATIVE result means that SARS-CoV-2 nucleic acids are not present   above   the limit of detection. A NEGATIVE result should be treated as   presumptive.   It does not rule out the  possibility of COVID-19 and should not be   the sole   basis for treatment decisions.    If COVID-19 is strongly suspected based on clinical and exposure   history,   re-testing using an alternate molecular assay should be considered.    This test is Food and Drug Administration (FDA) approved. Performance   characteristics of this has been independently verified by [facility] Department of Pathology and Laboratory Medicine.           Sodium   140       Troponin I   0.205  Comment: The reference interval for Troponin I represents the 99th percentile   cutoff   for our facility and is consistent with 3rd generation assay   performance.         WBC   7.92               Significant Imaging: I have reviewed all pertinent imaging results/findings within the past 24 hours.  Assessment/Plan:     * Atrial fibrillation  Patient has  new onset  atrial fibrillation. Patient is currently in atrial fibrillation. [id number] Score: 2. The patients heart rate in the last 24 hours is as follows:  Pulse  Min: 85  Max: 142     Antiarrhythmics  metoprolol tartrate (LOPRESSOR) tablet 25 mg, 2 times daily, Oral    Anticoagulants       Plan  - Replete lytes with a goal of K>4, Mg >2  - Patient is not anticoagulated due to defer to cardiology  - Patient's afib is currently uncontrolled. Will adjust treatment as follows: initiate metoprolol  - obtain echo  -check TSH  -cardiology consulted        Elevated troponin  -denies chest pain  -may be demand from afib with RVR  -trend      Essential hypertension  Patient's blood pressure range in the last 24 hours was: BP  Min: 106/84  Max: 149/75.The patient's inpatient anti-hypertensive regimen is listed below:  Current Antihypertensives  metoprolol tartrate (LOPRESSOR) tablet 25 mg, 2 times daily, Oral    Plan  - BP is controlled, no changes needed to their regimen  - hold home losartan for now      VTE Risk Mitigation (From admission, onward)           Ordered     IP VTE HIGH  RISK PATIENT  Once         01/24/25 1204     Place sequential compression device  Until discontinued         01/24/25 1204                                    Sean Vázquez MD  Department of Hospital Medicine  [facility] - Emergency Dept.

## 2025-01-24 NOTE — PHARMACY MED REC
"Admission Medication History     The home medication history was taken by Megan Weldon.    You may go to "Admission" then "Reconcile Home Medications" tabs to review and/or act upon these items.     The home medication list has been updated by the Pharmacy department.   Please read ALL comments highlighted in yellow.   Please address this information as you see fit.    Feel free to contact us if you have any questions or require assistance.        Medications listed below were obtained from: Patient/family and Analytic software- [employer]      [facility] REC COMPLETED:   Megan Weldon  [id number]    Current Outpatient Medications on File Prior to Encounter   Medication Sig Dispense Refill Last Dose/Taking    aspirin 81 MG Chew Take 81 mg by mouth once daily.   1/23/2025    losartan (COZAAR) 25 MG tablet TAKE 1 TABLET BY MOUTH EVERY DAY (Patient taking differently: Take 25 mg by mouth once daily.) 90 tablet 3 1/23/2025    melatonin 3 mg Tab Take 3 mg by mouth every evening.    1/23/2025    meloxicam (MOBIC) 15 MG tablet Take 1 tablet (15 mg total) by mouth once daily. 30 tablet 3 Past Week    multivit-min-FA-lycopen-lutein 0.4-300-250 mg-mcg-mcg Tab Take 1 tablet by mouth once daily.    1/23/2025                           .          "

## 2025-01-24 NOTE — SUBJECTIVE & OBJECTIVE
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    Melanoma     left leg, removed years ago    Nuclear sclerosis - Both Eyes 8/21/2013       Past Surgical History:   Procedure Laterality Date    ADENOIDECTOMY      CATARACT EXTRACTION W/  INTRAOCULAR LENS IMPLANT Right 05/16/2018        CATARACT EXTRACTION W/  INTRAOCULAR LENS IMPLANT Left 07/18/2018        INTRAOCULAR PROSTHESES INSERTION Left 7/18/2018    Procedure: INSERTION, INTRAOCULAR LENS PROSTHESIS;  Surgeon: Mari Dobson MD;  Location: [facility] OR [address] [place] [address];  Service: Ophthalmology;  Laterality: Left;    MALIGNANT SKIN LESION EXCISION      PHACOEMULSIFICATION OF CATARACT Left 7/18/2018    Procedure: PHACOEMULSIFICATION, CATARACT;  Surgeon: Mari Dobson MD;  Location: [facility] OR [address] [place] [address];  Service: Ophthalmology;  Laterality: Left;    TONSILLECTOMY      YAG CAPSULOTOMY Right 05/20/2021           Review of patient's allergies indicates:  No Known Allergies    No current facility-administered medications on file prior to encounter.     Current Outpatient Medications on File Prior to Encounter   Medication Sig    aspirin 81 MG Chew Take 81 mg by mouth once daily.    losartan (COZAAR) 25 MG tablet TAKE 1 TABLET BY MOUTH EVERY DAY    melatonin 3 mg Tab Take 3 mg by mouth every evening.     meloxicam (MOBIC) 15 MG tablet Take 1 tablet (15 mg total) by mouth once daily.    multivit-min-FA-lycopen-lutein 0.4-300-250 mg-mcg-mcg Tab Take 1 tablet by mouth once daily.      Family History       Problem Relation (Age of Onset)    Alcohol abuse Father    Aneurysm Sister    Cancer Sister    Heart disease Sister    Hypertension Sister    No Known Problems Mother, Maternal Aunt, Maternal Uncle, Paternal Aunt, Paternal Uncle, Maternal Grandmother, Maternal Grandfather, Paternal Grandmother, Paternal Grandfather, Son    Stomach cancer Sister    Stroke Sister           Tobacco Use    Smoking status: Former     Current packs/day: 0.00     Average packs/day: 1 pack/day for 15.0 years (15.0 ttl pk-yrs)     Types: Cigarettes     Start date: 5/15/1949     Quit date: 5/15/1964     Years since quittin.7     Passive exposure: Past    Smokeless tobacco: Never   Substance and Sexual Activity    Alcohol use: Yes     Alcohol/week: 2.0 standard drinks of alcohol     Types: 2 Cans of beer per week    Drug use: No    Sexual activity: Not Currently     Partners: Female     Review of Systems   Respiratory:  Positive for shortness of breath. Negative for cough.    Cardiovascular:  Negative for chest pain and palpitations.   All other systems reviewed and are negative.    Objective:     Vital Signs (Most Recent):  Temp: 97.6 °F (36.4 °C) (25 0906)  Pulse: 100 (25 1112)  Resp: 20 (25 1112)  BP: (!) 143/88 (25 1112)  SpO2: (!) 93 % (25 1112) Vital Signs (24h Range):  Temp:  [97.6 °F (36.4 °C)] 97.6 °F (36.4 °C)  Pulse:  [] 100  Resp:  [20-26] 20  SpO2:  [93 %-97 %] 93 %  BP: (106-149)/(68-89) 143/88     Weight: 89.9 kg (198 lb 3.2 oz)  Body mass index is 26.88 kg/m².     Physical Exam  HENT:      Head: Normocephalic and atraumatic.   Cardiovascular:      Rate and Rhythm: Tachycardia present. Rhythm irregular.      Heart sounds: No murmur heard.  Pulmonary:      Effort: Pulmonary effort is normal. No respiratory distress.      Breath sounds: Normal breath sounds. No wheezing.   Abdominal:      General: Bowel sounds are normal. There is no distension.      Palpations: Abdomen is soft.      Tenderness: There is no abdominal tenderness.   Musculoskeletal:         General: Swelling present.      Right lower leg: Edema present.      Left lower leg: Edema present.   Skin:     General: Skin is warm and dry.   Neurological:      Mental Status: He is alert and oriented to person, place, and time. Mental status is at baseline.                Significant Labs: All  pertinent labs within the past 24 hours have been reviewed.  Recent Lab Results         01/24/25  0924   01/24/25  0912        Influenza A, Molecular Negative         Influenza B, Molecular Negative         Albumin   3.9       ALP   67       ALT   29       Anion Gap   12       PTT   24.5  Comment: Refer to local heparin nomogram for intensity/dose specific   therapeutic   range.         AST   24       Baso #   0.10       Basophil %   1.3       BILIRUBIN TOTAL   1.0  Comment: For infants and newborns, interpretation of results should be based  on gestational age, weight and in agreement with clinical  observations.    Premature Infant recommended reference ranges:  Up to 24 hours.............<8.0 mg/dL  Up to 48 hours............<12.0 mg/dL  3-5 days..................<15.0 mg/dL  6-29 days.................<15.0 mg/dL         BNP   873  Comment: Values of less than 100 pg/ml are consistent with non-CHF populations.       BUN   19       Calcium   9.5       Chloride   107       CO2   21       Creatinine   1.2       Differential Method   Automated       eGFR   57       Eos #   0.2       Eos %   1.9       Flu A & B Source Nasal swab         Glucose   103       Gran # (ANC)   5.1       Gran %   64.8       Hematocrit   43.9       Hemoglobin   14.2       Hepatitis C Ab   Negative       HEP C Virus Hold Specimen   Hold for HCV sendout       HIV 1/2 Ag/Ab   Negative       Immature Grans (Abs)   0.02  Comment: Mild elevation in immature granulocytes is non specific and   can be seen in a variety of conditions including stress response,   acute inflammation, trauma and pregnancy. Correlation with other   laboratory and clinical findings is essential.         Immature Granulocytes   0.3       INR   1.0  Comment: Coumadin Therapy:  2.0 - 3.0 for INR for all indicators except mechanical heart valves  and antiphospholipid syndromes which should use 2.5 - 3.5.         Lymph #   1.9       Lymph %   24.0       Magnesium    2.1        MCH   28.8       MCHC   32.3       MCV   89       Mono #   0.6       Mono %   7.7       MPV   9.9       nRBC   0       Platelet Count   303       Potassium   3.8       PROTEIN TOTAL   7.4       PT   11.2       RBC   4.93       RDW   14.4       SARS-CoV-2 RNA, Amplification, Qual Negative  Comment: This test utilizes isothermal nucleic acid amplification technology   to   detect the SARS-CoV-2 RdRp nucleic acid segment. The analytical   sensitivity   (limit of detection) is 500 copies/swab.    A POSITIVE result is indicative of the presence of SARS-CoV-2 RNA;   clinical   correlation with patient history and other diagnostic information is   necessary to determine patient infection status.    A NEGATIVE result means that SARS-CoV-2 nucleic acids are not present   above   the limit of detection. A NEGATIVE result should be treated as   presumptive.   It does not rule out the possibility of COVID-19 and should not be   the sole   basis for treatment decisions.    If COVID-19 is strongly suspected based on clinical and exposure   history,   re-testing using an alternate molecular assay should be considered.    This test is Food and Drug Administration (FDA) approved. Performance   characteristics of this has been independently verified by [facility] Department of Pathology and Laboratory Medicine.           Sodium   140       Troponin I   0.205  Comment: The reference interval for Troponin I represents the 99th percentile   cutoff   for our facility and is consistent with 3rd generation assay   performance.         WBC   7.92               Significant Imaging: I have reviewed all pertinent imaging results/findings within the past 24 hours.

## 2025-01-24 NOTE — ASSESSMENT & PLAN NOTE
Patient's blood pressure range in the last 24 hours was: BP  Min: 106/84  Max: 149/75.The patient's inpatient anti-hypertensive regimen is listed below:  Current Antihypertensives  metoprolol tartrate (LOPRESSOR) tablet 25 mg, 2 times daily, Oral    Plan  - BP is controlled, no changes needed to their regimen  - hold home losartan for now

## 2025-01-24 NOTE — ED PROVIDER NOTES
SCRIBE #1 NOTE: IMushtaq, am scribing for, and in the presence of, Jorge L Santana Jr., MD. I have scribed the entire note.       History     Chief Complaint   Patient presents with    Shortness of Breath     Patient presents to ED with c/o SOB x 1 week. Denies fever. Denies chest pain.     Review of patient's allergies indicates:  No Known Allergies      History of Present Illness     HPI    1/24/2025, 9:10 AM  History obtained from the patient      History of Present Illness: Jose Latham is a 89 y.o. male patient with a PMHx of essential HTN and melanoma who presents to the Emergency Department for evaluation of SOB which onset gradually within the past week. Pt is retired from working with electronics. Pt denies any hx of smoking or oxygen usage at home. Reports that he has recently undergone cold-like sxs with none currently. Symptoms are constant and moderate in severity. No mitigating or exacerbating factors reported. Associated sxs include leg swelling. Patient denies any fever, CP, dysuria, sore throat, congestion, rhinorrhea, and all other sxs at this time. No prior Tx. No further complaints or concerns at this time.       Arrival mode: Personal vehicle      PCP: Ron Calle MD        Past Medical History:  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    Melanoma     left leg, removed years ago    Nuclear sclerosis - Both Eyes 8/21/2013       Past Surgical History:  Past Surgical History:   Procedure Laterality Date    ADENOIDECTOMY      CATARACT EXTRACTION W/  INTRAOCULAR LENS IMPLANT Right 05/16/2018        CATARACT EXTRACTION W/  INTRAOCULAR LENS IMPLANT Left 07/18/2018        INTRAOCULAR PROSTHESES INSERTION Left 7/18/2018    Procedure: INSERTION, INTRAOCULAR LENS PROSTHESIS;  Surgeon: Mari Dobson MD;  Location: [facility] OR [address] [place] [address];  Service: Ophthalmology;  Laterality: Left;     MALIGNANT SKIN LESION EXCISION      PHACOEMULSIFICATION OF CATARACT Left 2018    Procedure: PHACOEMULSIFICATION, CATARACT;  Surgeon: Mari Dobson MD;  Location: [facility] OR [address] [place] [address];  Service: Ophthalmology;  Laterality: Left;    TONSILLECTOMY      YAG CAPSULOTOMY Right 2021             Family History:  Family History   Problem Relation Name Age of Onset    Stroke Sister x3     Hypertension Sister x3     Cancer Sister x3         Stomach    Stomach cancer Sister x3     Heart disease Sister x3     Aneurysm Sister x3     No Known Problems Mother      Alcohol abuse Father      No Known Problems Maternal Aunt      No Known Problems Maternal Uncle      No Known Problems Paternal Aunt      No Known Problems Paternal Uncle      No Known Problems Maternal Grandmother      No Known Problems Maternal Grandfather      No Known Problems Paternal Grandmother      No Known Problems Paternal Grandfather      No Known Problems Son x2     Amblyopia Neg Hx      Blindness Neg Hx      Cataracts Neg Hx      Diabetes Neg Hx      Glaucoma Neg Hx      Macular degeneration Neg Hx      Retinal detachment Neg Hx      Strabismus Neg Hx      Thyroid disease Neg Hx         Social History:  Social History     Tobacco Use    Smoking status: Former     Current packs/day: 0.00     Average packs/day: 1 pack/day for 15.0 years (15.0 ttl pk-yrs)     Types: Cigarettes     Start date: 5/15/1949     Quit date: 5/15/1964     Years since quittin.7     Passive exposure: Past    Smokeless tobacco: Never   Substance and Sexual Activity    Alcohol use: Yes     Alcohol/week: 2.0 standard drinks of alcohol     Types: 2 Cans of beer per week    Drug use: No    Sexual activity: Not Currently     Partners: Female        Review of Systems     Review of Systems   Constitutional:  Negative for chills and fever.   HENT:  Negative for congestion, rhinorrhea and sore throat.    Respiratory:  Positive for shortness of breath. Negative for  cough.    Cardiovascular:  Positive for leg swelling. Negative for chest pain.   Gastrointestinal:  Negative for abdominal pain, nausea and vomiting.   Genitourinary:  Negative for dysuria.   Musculoskeletal:  Negative for back pain.   Skin:  Negative for rash.   Neurological:  Negative for dizziness, weakness, light-headedness and headaches.   Hematological:  Does not bruise/bleed easily.   All other systems reviewed and are negative.       Physical Exam     Initial Vitals [01/24/25 0858]   BP Pulse Resp Temp SpO2   125/89 (S) (!) 141 (S) (!) 22 97.6 °F (36.4 °C) (!) 94 %      MAP       --          Physical Exam  Nursing Notes and Vital Signs Reviewed.  Constitutional: Patient is in no acute distress. Well-developed and well-nourished.  Head: Atraumatic. Normocephalic.  Eyes:  EOM intact.  No scleral icterus.  ENT: Mucous membranes are moist.  Nares clear   Neck:  Full ROM. No JVD.  Cardiovascular: Regular rate. Regular rhythm No murmurs, rubs, or gallops. Distal pulses are 2+ and symmetric patient is markedly tachycardic on  Pulmonary/Chest: No respiratory distress. Clear to auscultation bilaterally. No wheezing or rales.  Equal chest wall rise bilaterally  Abdominal: Soft and non-distended.  There is no tenderness.  No rebound, guarding, or rigidity. Good bowel sounds.  Genitourinary: No CVA tenderness.  No suprapubic tenderness  Musculoskeletal: Moves all extremities. No obvious deformities.  5 x 5 strength in all extremities   Skin: Warm and dry.  Neurological:  Alert, awake, and appropriate.  Normal speech.  No acute focal neurological deficits are appreciated.  Two through 12 intact bilaterally.  Psychiatric: Normal affect. Good eye contact. Appropriate in content.           ED Course   Critical Care    Date/Time: 1/24/2025 12:32 PM    Performed by: Jorge L Santana Jr., MD  Authorized by: Jorge L Santana Jr., MD  Direct patient critical care time: 24 minutes  Additional history critical care time: 9  minutes  Ordering / reviewing critical care time: 10 minutes  Documentation critical care time: 11 minutes  Consulting other physicians critical care time: 5 minutes  Total critical care time (exclusive of procedural time) : 59 minutes  Critical care time was exclusive of separately billable procedures and treating other patients and teaching time.  Critical care was necessary to treat or prevent imminent or life-threatening deterioration of the following conditions: circulatory failure and cardiac failure.  Critical care was time spent personally by me on the following activities: discussions with consultants, development of treatment plan with patient or surrogate, interpretation of cardiac output measurements, evaluation of patient's response to treatment, examination of patient, obtaining history from patient or surrogate, ordering and review of laboratory studies, ordering and performing treatments and interventions, ordering and review of radiographic studies, review of old charts and re-evaluation of patient's condition.        ED Vital Signs:  Vitals:    01/24/25 0858 01/24/25 0906 01/24/25 0918 01/24/25 0925   BP: 125/89 (!) 145/84 (!) 145/84 107/72   Pulse: (S) (!) 141 (!) 142 (!) 131 101   Resp: (S) (!) 22 (!) 22 (!) 22 (!) 22   Temp: 97.6 °F (36.4 °C) 97.6 °F (36.4 °C)     TempSrc: Oral Oral     SpO2: (!) 94% 97% 97% 95%   Weight: 89.9 kg (198 lb 3.2 oz)      Height: 6' (1.829 m)       01/24/25 0930 01/24/25 0941 01/24/25 0943 01/24/25 0945   BP: 106/84  (!) 149/75 (!) 149/75   Pulse: 109 (!) 117  (!) 127   Resp: 20 20  (!) 26   Temp:       TempSrc:       SpO2: (!) 94% (!) 94%  (!) 93%   Weight:       Height:        01/24/25 0947 01/24/25 1000 01/24/25 1030 01/24/25 1112   BP: 122/81 112/68 123/89 (!) 143/88   Pulse: 102 85 92 100   Resp: (!) 24 20 20 20   Temp:       TempSrc:       SpO2: (!) 94% (!) 93% (!) 94% (!) 93%   Weight:       Height:        01/24/25 1216 01/24/25 1228   BP: 106/86 117/85    Pulse: (!) 120 (!) 120   Resp:  20   Temp:     TempSrc:     SpO2:  95%   Weight:     Height:         Abnormal Lab Results:  Labs Reviewed   COMPREHENSIVE METABOLIC PANEL - Abnormal       Result Value    Sodium 140      Potassium 3.8      Chloride 107      CO2 21 (*)     Glucose 103      BUN 19      Creatinine 1.2      Calcium 9.5      Total Protein 7.4      Albumin 3.9      Total Bilirubin 1.0      Alkaline Phosphatase 67      AST 24      ALT 29      eGFR 57 (*)     Anion Gap 12      Narrative:     Release to patient->Immediate   TROPONIN I - Abnormal    Troponin I 0.205 (*)     Narrative:     Release to patient->Immediate   B-TYPE NATRIURETIC PEPTIDE - Abnormal     (*)     Narrative:     Release to patient->Immediate   INFLUENZA A & B BY MOLECULAR    Influenza A, Molecular Negative      Influenza B, Molecular Negative      Flu A & B Source Nasal swab     HEPATITIS C ANTIBODY    Hepatitis C Ab Negative      Narrative:     Release to patient->Immediate   HEP C VIRUS HOLD SPECIMEN    HEP C Virus Hold Specimen Hold for HCV sendout      Narrative:     Release to patient->Immediate   HIV 1 / 2 ANTIBODY    HIV 1/2 Ag/Ab Negative      Narrative:     Release to patient->Immediate   CBC W/ AUTO DIFFERENTIAL    WBC 7.92      RBC 4.93      Hemoglobin 14.2      Hematocrit 43.9      MCV 89      MCH 28.8      MCHC 32.3      RDW 14.4      Platelets 303      MPV 9.9      Immature Granulocytes 0.3      Gran # (ANC) 5.1      Immature Grans (Abs) 0.02      Lymph # 1.9      Mono # 0.6      Eos # 0.2      Baso # 0.10      nRBC 0      Gran % 64.8      Lymph % 24.0      Mono % 7.7      Eosinophil % 1.9      Basophil % 1.3      Differential Method Automated      Narrative:     Release to patient->Immediate   MAGNESIUM    Magnesium 2.1      Narrative:     Release to patient->Immediate   PROTIME-INR    Prothrombin Time 11.2      INR 1.0      Narrative:     Release to patient->Immediate   APTT    aPTT 24.5      Narrative:     Release  to patient->Immediate   SARS-COV-2 RNA AMPLIFICATION, QUAL    SARS-CoV-2 RNA, Amplification, Qual Negative     TSH   TROPONIN I   URINALYSIS, REFLEX TO URINE CULTURE        All Lab Results:  Results for orders placed or performed during the hospital encounter of 01/24/25   EKG 12-lead    Collection Time: 01/24/25  8:55 AM   Result Value Ref Range    QRS Duration 90 ms    [facility] QTC Calculation 513 ms   Hepatitis C Antibody    Collection Time: 01/24/25  9:12 AM   Result Value Ref Range    Hepatitis C Ab Negative Negative   HCV Virus Hold Specimen    Collection Time: 01/24/25  9:12 AM   Result Value Ref Range    HEP C Virus Hold Specimen Hold for HCV sendout    HIV 1/2 Ag/Ab (4th Gen)    Collection Time: 01/24/25  9:12 AM   Result Value Ref Range    HIV 1/2 Ag/Ab Negative Negative   CBC auto differential    Collection Time: 01/24/25  9:12 AM   Result Value Ref Range    WBC 7.92 3.90 - 12.70 K/uL    RBC 4.93 4.60 - 6.20 M/uL    Hemoglobin 14.2 14.0 - 18.0 g/dL    Hematocrit 43.9 40.0 - 54.0 %    MCV 89 82 - 98 fL    MCH 28.8 27.0 - 31.0 pg    MCHC 32.3 32.0 - 36.0 g/dL    RDW 14.4 11.5 - 14.5 %    Platelets 303 150 - 450 K/uL    MPV 9.9 9.2 - 12.9 fL    Immature Granulocytes 0.3 0.0 - 0.5 %    Gran # (ANC) 5.1 1.8 - 7.7 K/uL    Immature Grans (Abs) 0.02 0.00 - 0.04 K/uL    Lymph # 1.9 1.0 - 4.8 K/uL    Mono # 0.6 0.3 - 1.0 K/uL    Eos # 0.2 0.0 - 0.5 K/uL    Baso # 0.10 0.00 - 0.20 K/uL    nRBC 0 0 /100 WBC    Gran % 64.8 38.0 - 73.0 %    Lymph % 24.0 18.0 - 48.0 %    Mono % 7.7 4.0 - 15.0 %    Eosinophil % 1.9 0.0 - 8.0 %    Basophil % 1.3 0.0 - 1.9 %    Differential Method Automated    Comprehensive metabolic panel    Collection Time: 01/24/25  9:12 AM   Result Value Ref Range    Sodium 140 136 - 145 mmol/L    Potassium 3.8 3.5 - 5.1 mmol/L    Chloride 107 95 - 110 mmol/L    CO2 21 (L) 23 - 29 mmol/L    Glucose 103 70 - 110 mg/dL    BUN 19 8 - 23 mg/dL    Creatinine 1.2 0.5 - 1.4 mg/dL    Calcium 9.5 8.7 - 10.5 mg/dL     Total Protein 7.4 6.0 - 8.4 g/dL    Albumin 3.9 3.5 - 5.2 g/dL    Total Bilirubin 1.0 0.1 - 1.0 mg/dL    Alkaline Phosphatase 67 40 - 150 U/L    AST 24 10 - 40 U/L    ALT 29 10 - 44 U/L    eGFR 57 (A) >60 mL/min/1.73 m^2    Anion Gap 12 8 - 16 mmol/L   Magnesium    Collection Time: 01/24/25  9:12 AM   Result Value Ref Range    Magnesium 2.1 1.6 - 2.6 mg/dL   Troponin I    Collection Time: 01/24/25  9:12 AM   Result Value Ref Range    Troponin I 0.205 (H) 0.000 - 0.026 ng/mL   Protime-INR    Collection Time: 01/24/25  9:12 AM   Result Value Ref Range    Prothrombin Time 11.2 9.0 - 12.5 sec    INR 1.0 0.8 - 1.2   APTT    Collection Time: 01/24/25  9:12 AM   Result Value Ref Range    aPTT 24.5 21.0 - 32.0 sec   Brain natriuretic peptide    Collection Time: 01/24/25  9:12 AM   Result Value Ref Range     (H) 0 - 99 pg/mL   Influenza A & B by Molecular    Collection Time: 01/24/25  9:24 AM    Specimen: Nasopharyngeal Swab   Result Value Ref Range    Influenza A, Molecular Negative Negative    Influenza B, Molecular Negative Negative    Flu A & B Source Nasal swab    COVID-19 Rapid Screening    Collection Time: 01/24/25  9:24 AM   Result Value Ref Range    SARS-CoV-2 RNA, Amplification, Qual Negative Negative         Imaging Results:  Imaging Results              X-Ray Chest AP Portable (Final result)  Result time 01/24/25 09:47:52      Final result by Shai Fam MD (01/24/25 09:47:52)                   Impression:      As above      Electronically signed by: Shai Fam MD  Date:    01/24/2025  Time:    09:47               Narrative:    EXAMINATION:  XR CHEST AP PORTABLE    CLINICAL HISTORY:  dyspnea;    FINDINGS:  Single view of the chest.  Comparison 09/10/2022    Cardiac silhouette is normal.  Low lung volumes limits evaluation.  Mild atelectasis or scarring left lung base.  No evidence of pleural effusion or pneumothorax.  Bones appear intact.  Moderate degenerative changes and moderate atherosclerotic  disease.                                       The EKG was ordered, reviewed, and independently interpreted by the ED provider.  Interpretation time: 08:55  Rate: 137 BPM  Rhythm: atrial fibrillation with RVR with premature ventricular or aberrantly conducted complexes  Interpretation: Possible anterior infarct,a ge undetermined. No STEMI.             The Emergency Provider reviewed the vital signs and test results, which are outlined above.     ED Discussion       10:48 AM: Discussed case with AZUCENA Crane ([facility]). Dr. Vázquez agrees with current care and management of pt and accepts admission.   Admitting Service:   Admitting Physician: Dr. Vázquez  Admit to: Inpatient Med/Tele     10:48 AM: Re-evaluated pt. I have discussed test results, shared treatment plan, and the need for admission with patient and family at bedside. Pt and family express understanding at this time and agree with all information. All questions answered. Pt and family have no further questions or concerns at this time. Pt is ready for admit.        Medical Decision Making  Differential diagnosis:  CHF, SVT, atrial fibrillation with rapid ventricular response, cardiac event, STEMI, NSTEMI, cardiac ischemia    Patient presents complaining of shortness a breath and orthopnea.  He has markedly tachycardic on arrival and was found to be in AFib RVR which is new in onset.  His rate control with several doses of Cardizem given Lasix for his pedal edema shortness of breath.  Patient's workup reveals an elevated troponin and with new onset CHF, AFib RVR.  He is being admitted to Hospital Medicine further evaluation and treatment    Amount and/or Complexity of Data Reviewed  External Data Reviewed: notes.  Labs: ordered. Decision-making details documented in ED Course.     Details: Troponin elevated at 0.205.  Magnesium normal 2.1.  CMP is benign.  BNP is elevated at 73.  At coags are normal.  Normal white count.  Normal  H&H  Radiology: ordered. Decision-making details documented in ED Course.     Details: Mild atelectasis or scarring in the left lung base  ECG/medicine tests: ordered and independent interpretation performed. Decision-making details documented in ED Course.     Details: AFib RVR  Discussion of management or test interpretation with external provider(s): Discussed the case with hospital medicine graciously accepts for admission.  Cardiology is on board as well    Risk  OTC drugs.  Prescription drug management.  Drug therapy requiring intensive monitoring for toxicity.  Decision regarding hospitalization.    Critical Care  Total time providing critical care: 59 minutes                ED Medication(s):  Medications   ondansetron injection 4 mg (4 mg Intravenous Not Given 1/24/25 1130)   HYDROmorphone injection 1 mg (1 mg Intravenous Not Given 1/24/25 1130)   sodium chloride 0.9% flush 10 mL (has no administration in time range)   naloxone 0.4 mg/mL injection 0.02 mg (has no administration in time range)   glucose chewable tablet 16 g (has no administration in time range)   glucose chewable tablet 24 g (has no administration in time range)   glucagon (human recombinant) injection 1 mg (has no administration in time range)   acetaminophen tablet 650 mg (has no administration in time range)   ondansetron injection 4 mg (has no administration in time range)   melatonin tablet 6 mg (has no administration in time range)   aluminum-magnesium hydroxide-simethicone 200-200-20 mg/5 mL suspension 30 mL (has no administration in time range)   simethicone chewable tablet 80 mg (has no administration in time range)   aspirin chewable tablet 81 mg (has no administration in time range)   metoprolol tartrate (LOPRESSOR) tablet 25 mg (25 mg Oral Given 1/24/25 1216)   diltiaZEM injection 20 mg (20 mg Intravenous Given 1/24/25 0918)   furosemide injection 60 mg (60 mg Intravenous Given 1/24/25 0921)   diltiaZEM injection 20 mg (20 mg  Intravenous Given 1/24/25 0945)   aspirin tablet 325 mg (325 mg Oral Given 1/24/25 1036)       New Prescriptions    No medications on file               Scribe Attestation:   Scribe #1: I performed the above scribed service and the documentation accurately describes the services I performed. I attest to the accuracy of the note.     Attending:   Physician Attestation Statement for Scribe #1: I, Jorge L Santana Jr., MD, personally performed the services described in this documentation, as scribed by Mushtaq Villegas, in my presence, and it is both accurate and complete.           Clinical Impression       ICD-10-CM ICD-9-CM   1. Atrial fibrillation with rapid ventricular response  I48.91 427.31   2. SOB (shortness of breath)  R06.02 786.05   3. Acute congestive heart failure, unspecified heart failure type  I50.9 428.0   4. Troponin level elevated  R79.89 790.6   5. Atrial fibrillation with RVR  I48.91 427.31   6. Chest pain  R07.9 786.50       Disposition:   Disposition: Admitted  Condition: Stable        Jorge L Santana Jr., MD  01/24/25 1235

## 2025-01-24 NOTE — HPI
The patient is a 89 yo male with past medical history of arthritis, hypertension and melanoma who presented to the ED with shortness of breath. He reports symptoms started a week before presentation. He reports he is pretty active. He plays golf and dances on a regular basis. He had some cold symptoms but that has resolved. He denies chest pain. Workup in the ED revealed atrial fibrillation with RVR. Cardiology and hospital medicine consulted.

## 2025-01-24 NOTE — ASSESSMENT & PLAN NOTE
Patient has  new onset  atrial fibrillation. Patient is currently in atrial fibrillation. [id number] Score: 2. The patients heart rate in the last 24 hours is as follows:  Pulse  Min: 85  Max: 142     Antiarrhythmics  metoprolol tartrate (LOPRESSOR) tablet 25 mg, 2 times daily, Oral    Anticoagulants       Plan  - Replete lytes with a goal of K>4, Mg >2  - Patient is not anticoagulated due to defer to cardiology  - Patient's afib is currently uncontrolled. Will adjust treatment as follows: initiate metoprolol  - obtain echo  -check TSH  -cardiology consulted

## 2025-01-25 PROBLEM — I50.20 SYSTOLIC HEART FAILURE: Status: ACTIVE | Noted: 2025-01-25

## 2025-01-25 LAB
ALBUMIN SERPL BCP-MCNC: 3.3 G/DL (ref 3.5–5.2)
ALP SERPL-CCNC: 54 U/L (ref 40–150)
ALT SERPL W/O P-5'-P-CCNC: 21 U/L (ref 10–44)
ANION GAP SERPL CALC-SCNC: 10 MMOL/L (ref 8–16)
APTT PPP: 26.5 SEC (ref 21–32)
AST SERPL-CCNC: 20 U/L (ref 10–40)
BASOPHILS # BLD AUTO: 0.09 K/UL (ref 0–0.2)
BASOPHILS NFR BLD: 1.3 % (ref 0–1.9)
BILIRUB SERPL-MCNC: 1.1 MG/DL (ref 0.1–1)
BUN SERPL-MCNC: 20 MG/DL (ref 8–23)
CALCIUM SERPL-MCNC: 8.8 MG/DL (ref 8.7–10.5)
CHLORIDE SERPL-SCNC: 105 MMOL/L (ref 95–110)
CO2 SERPL-SCNC: 22 MMOL/L (ref 23–29)
CREAT SERPL-MCNC: 1.1 MG/DL (ref 0.5–1.4)
DIFFERENTIAL METHOD BLD: ABNORMAL
EOSINOPHIL # BLD AUTO: 0.2 K/UL (ref 0–0.5)
EOSINOPHIL NFR BLD: 2.3 % (ref 0–8)
ERYTHROCYTE [DISTWIDTH] IN BLOOD BY AUTOMATED COUNT: 14.1 % (ref 11.5–14.5)
EST. GFR  (NO RACE VARIABLE): >60 ML/MIN/1.73 M^2
GLUCOSE SERPL-MCNC: 96 MG/DL (ref 70–110)
HCT VFR BLD AUTO: 38.4 % (ref 40–54)
HGB BLD-MCNC: 12.3 G/DL (ref 14–18)
IMM GRANULOCYTES # BLD AUTO: 0.01 K/UL (ref 0–0.04)
IMM GRANULOCYTES NFR BLD AUTO: 0.1 % (ref 0–0.5)
INR PPP: 1 (ref 0.8–1.2)
LYMPHOCYTES # BLD AUTO: 1.5 K/UL (ref 1–4.8)
LYMPHOCYTES NFR BLD: 21.8 % (ref 18–48)
MAGNESIUM SERPL-MCNC: 2 MG/DL (ref 1.6–2.6)
MCH RBC QN AUTO: 28.3 PG (ref 27–31)
MCHC RBC AUTO-ENTMCNC: 32 G/DL (ref 32–36)
MCV RBC AUTO: 89 FL (ref 82–98)
MONOCYTES # BLD AUTO: 0.6 K/UL (ref 0.3–1)
MONOCYTES NFR BLD: 9.2 % (ref 4–15)
NEUTROPHILS # BLD AUTO: 4.5 K/UL (ref 1.8–7.7)
NEUTROPHILS NFR BLD: 65.3 % (ref 38–73)
NRBC BLD-RTO: 0 /100 WBC
PLATELET # BLD AUTO: 273 K/UL (ref 150–450)
PMV BLD AUTO: 10.2 FL (ref 9.2–12.9)
POTASSIUM SERPL-SCNC: 3.9 MMOL/L (ref 3.5–5.1)
PROT SERPL-MCNC: 6 G/DL (ref 6–8.4)
PROTHROMBIN TIME: 11.6 SEC (ref 9–12.5)
RBC # BLD AUTO: 4.34 M/UL (ref 4.6–6.2)
SODIUM SERPL-SCNC: 137 MMOL/L (ref 136–145)
WBC # BLD AUTO: 6.88 K/UL (ref 3.9–12.7)

## 2025-01-25 PROCEDURE — 85730 THROMBOPLASTIN TIME PARTIAL: CPT | Performed by: INTERNAL MEDICINE

## 2025-01-25 PROCEDURE — 21400001 HC TELEMETRY ROOM

## 2025-01-25 PROCEDURE — 99223 1ST HOSP IP/OBS HIGH 75: CPT | Mod: ,,, | Performed by: INTERNAL MEDICINE

## 2025-01-25 PROCEDURE — 85025 COMPLETE CBC W/AUTO DIFF WBC: CPT | Performed by: INTERNAL MEDICINE

## 2025-01-25 PROCEDURE — 36415 COLL VENOUS BLD VENIPUNCTURE: CPT | Performed by: INTERNAL MEDICINE

## 2025-01-25 PROCEDURE — 25000003 PHARM REV CODE 250: Performed by: INTERNAL MEDICINE

## 2025-01-25 PROCEDURE — 83735 ASSAY OF MAGNESIUM: CPT | Performed by: INTERNAL MEDICINE

## 2025-01-25 PROCEDURE — 25000003 PHARM REV CODE 250: Performed by: HOSPITALIST

## 2025-01-25 PROCEDURE — 63600175 PHARM REV CODE 636 W HCPCS: Performed by: INTERNAL MEDICINE

## 2025-01-25 PROCEDURE — 80053 COMPREHEN METABOLIC PANEL: CPT | Performed by: INTERNAL MEDICINE

## 2025-01-25 PROCEDURE — 85610 PROTHROMBIN TIME: CPT | Performed by: INTERNAL MEDICINE

## 2025-01-25 RX ORDER — FUROSEMIDE 10 MG/ML
20 INJECTION INTRAMUSCULAR; INTRAVENOUS ONCE
Status: COMPLETED | OUTPATIENT
Start: 2025-01-25 | End: 2025-01-25

## 2025-01-25 RX ORDER — FUROSEMIDE 10 MG/ML
20 INJECTION INTRAMUSCULAR; INTRAVENOUS DAILY
Status: DISCONTINUED | OUTPATIENT
Start: 2025-01-26 | End: 2025-01-27

## 2025-01-25 RX ORDER — HEPARIN SODIUM,PORCINE/D5W 25000/250
0-40 INTRAVENOUS SOLUTION INTRAVENOUS CONTINUOUS
Status: DISCONTINUED | OUTPATIENT
Start: 2025-01-25 | End: 2025-01-27

## 2025-01-25 RX ORDER — METOPROLOL TARTRATE 1 MG/ML
5 INJECTION, SOLUTION INTRAVENOUS ONCE
Status: COMPLETED | OUTPATIENT
Start: 2025-01-25 | End: 2025-01-25

## 2025-01-25 RX ADMIN — SALINE NASAL SPRAY 1 SPRAY: 1.5 SOLUTION NASAL at 02:01

## 2025-01-25 RX ADMIN — AMIODARONE HYDROCHLORIDE 150 MG: 1.5 INJECTION, SOLUTION INTRAVENOUS at 07:01

## 2025-01-25 RX ADMIN — METOROPROLOL TARTRATE 5 MG: 5 INJECTION, SOLUTION INTRAVENOUS at 11:01

## 2025-01-25 RX ADMIN — HEPARIN SODIUM 12 UNITS/KG/HR: 10000 INJECTION, SOLUTION INTRAVENOUS at 06:01

## 2025-01-25 RX ADMIN — Medication 6 MG: at 08:01

## 2025-01-25 RX ADMIN — METOPROLOL TARTRATE 25 MG: 25 TABLET, FILM COATED ORAL at 08:01

## 2025-01-25 RX ADMIN — ASPIRIN 81 MG CHEWABLE TABLET 81 MG: 81 TABLET CHEWABLE at 08:01

## 2025-01-25 RX ADMIN — Medication 6 MG: at 02:01

## 2025-01-25 RX ADMIN — AMIODARONE HYDROCHLORIDE 1 MG/MIN: 1.8 INJECTION, SOLUTION INTRAVENOUS at 07:01

## 2025-01-25 RX ADMIN — FUROSEMIDE 20 MG: 10 INJECTION, SOLUTION INTRAMUSCULAR; INTRAVENOUS at 12:01

## 2025-01-25 NOTE — PLAN OF CARE
[facility] - Telemetry (Hospital)  Initial Discharge Assessment       Primary Care Provider: Ron Calle MD    Admission Diagnosis: SOB (shortness of breath) [R06.02]  Troponin level elevated [R79.89]  Atrial fibrillation with rapid ventricular response [I48.91]  Chest pain [R07.9]  Atrial fibrillation with RVR [I48.91]  Acute congestive heart failure, unspecified heart failure type [I50.9]    Admission Date: 1/24/2025  Expected Discharge Date:          Payor: [employer] MANAGED MEDICARE / Plan: [employer] MEDICARE HMO / Product Type: Capitation /     Extended Emergency Contact Information  Primary Emergency Contact: JovaniMelchor  Mobile Phone: [phone]  Relation: Son  Preferred language: English   needed? No    Discharge Plan A: Home  Discharge Plan B: Home with family      [id number] - [place] - [id number]  [address] [place] [id number]  Phone: [phone] Fax: [phone]      Initial Assessment (most recent)       Adult Discharge Assessment - 01/25/25 1304          Discharge Assessment    Assessment Type Discharge Planning Assessment     Confirmed/corrected address, phone number and insurance Yes     Confirmed Demographics Correct on Facesheet     Source of Information patient     Reason For Admission SOB & A-fib     People in Home alone   pt lives in apt attached to son's home    Do you expect to return to your current living situation? Yes     Do you have help at home or someone to help you manage your care at home? Yes     Who are your caregiver(s) and their phone number(s)? magalie Wayne [phone]     Walking or Climbing Stairs Difficulty no     Dressing/Bathing Difficulty no     Equipment Currently Used at Home none     Readmission within 30 days? No     Patient currently being followed by outpatient case management? No     Do you currently have service(s) that help you manage your care at home? No     Do you take prescription medications? Yes     Do you  have prescription coverage? Yes     Coverage [employer]     Do you have any problems affording any of your prescribed medications? No     Is the patient taking medications as prescribed? yes     Who is going to help you get home at discharge? Son     How do you get to doctors appointments? car, drives self     Are you on dialysis? No     Do you take coumadin? No     Discharge Plan A Home     Discharge Plan B Home with family     DME Needed Upon Discharge  none     Discharge Plan discussed with: Patient

## 2025-01-25 NOTE — ASSESSMENT & PLAN NOTE
Patient has  new onset  atrial fibrillation. Patient is currently in atrial fibrillation. [id number] Score: 2. The patients heart rate in the last 24 hours is as follows:  Pulse  Min: 96  Max: 123     Antiarrhythmics  metoprolol tartrate (LOPRESSOR) tablet 25 mg, 2 times daily, Oral    Anticoagulants       Plan  - Replete lytes with a goal of K>4, Mg >2  - Patient is not anticoagulated due to defer to cardiology  -cardiology consulted

## 2025-01-25 NOTE — PLAN OF CARE
Plan of care reviewed with patient with verbal understanding. Chart and orders reviewed.  Pt remains free from falls this shift, Safety precautions in place.   Pt currently resting comfortably in bed.   Purposeful rounding with bed in lowest position, side rails up, call light in reach.  Will continue to monitor until end of shift.       Problem: Adult Inpatient Plan of Care  Goal: Plan of Care Review  Outcome: Progressing  Flowsheets (Taken 1/25/2025 0009)  Plan of Care Reviewed With: patient  Goal: Patient-Specific Goal (Individualized)  Outcome: Progressing  Flowsheets (Taken 1/25/2025 0009)  Individualized Care Needs: The pt request the bed alarm not be put on.  Anxieties, Fears or Concerns: Pt claims no anxiety  Patient/Family-Specific Goals (Include Timeframe): The pt will be hemodynamically stable by end of admission.     Problem: Fall Injury Risk  Goal: Absence of Fall and Fall-Related Injury  Outcome: Progressing     Problem: Dysrhythmia  Goal: Normalized Cardiac Rhythm  Outcome: Progressing     Problem: Fatigue  Goal: Improved Activity Tolerance  Outcome: Progressing

## 2025-01-25 NOTE — SUBJECTIVE & OBJECTIVE
Interval History: f/u atrial fibrillation rate fluctuating. Discussed awaiting cardiology evaluation for adjustment of medications    Review of Systems  Objective:     Vital Signs (Most Recent):  Temp: 97.3 °F (36.3 °C) (01/25/25 1154)  Pulse: 108 (01/25/25 1154)  Resp: 20 (01/25/25 1154)  BP: (!) 126/90 (01/25/25 1154)  SpO2: (!) 94 % (01/25/25 1154) Vital Signs (24h Range):  Temp:  [97.3 °F (36.3 °C)-98.2 °F (36.8 °C)] 97.3 °F (36.3 °C)  Pulse:  [] 108  Resp:  [20-22] 20  SpO2:  [92 %-97 %] 94 %  BP: (117-158)/() 126/90     Weight: 97.8 kg (215 lb 9.8 oz)  Body mass index is 29.24 kg/m².    Intake/Output Summary (Last 24 hours) at 1/25/2025 1520  Last data filed at 1/25/2025 1130  Gross per 24 hour   Intake --   Output 1600 ml   Net -1600 ml         Physical Exam  HENT:      Head: Normocephalic and atraumatic.   Cardiovascular:      Rate and Rhythm: Tachycardia present. Rhythm irregular.      Heart sounds: No murmur heard.  Pulmonary:      Effort: Pulmonary effort is normal. No respiratory distress.      Breath sounds: Normal breath sounds. No wheezing.   Abdominal:      General: Bowel sounds are normal. There is no distension.      Palpations: Abdomen is soft.      Tenderness: There is no abdominal tenderness.   Musculoskeletal:         General: Swelling present.      Right lower leg: Edema present.      Left lower leg: Edema present.   Skin:     General: Skin is warm and dry.   Neurological:      Mental Status: He is alert and oriented to person, place, and time. Mental status is at baseline.             Significant Labs: All pertinent labs within the past 24 hours have been reviewed.  Recent Lab Results         01/25/25  0434   01/24/25  1856        Albumin 3.3         ALP 54         ALT 21         Anion Gap 10         AST 20         Baso # 0.09         Basophil % 1.3         BILIRUBIN TOTAL 1.1  Comment: For infants and newborns, interpretation of results should be based  on gestational age, weight  and in agreement with clinical  observations.    Premature Infant recommended reference ranges:  Up to 24 hours.............<8.0 mg/dL  Up to 48 hours............<12.0 mg/dL  3-5 days..................<15.0 mg/dL  6-29 days.................<15.0 mg/dL           BUN 20         Calcium 8.8         Chloride 105         CO2 22         Creatinine 1.1         Differential Method Automated         eGFR >60         Eos # 0.2         Eos % 2.3         Glucose 96         Gran # (ANC) 4.5         Gran % 65.3         Hematocrit 38.4         Hemoglobin 12.3         Immature Grans (Abs) 0.01  Comment: Mild elevation in immature granulocytes is non specific and   can be seen in a variety of conditions including stress response,   acute inflammation, trauma and pregnancy. Correlation with other   laboratory and clinical findings is essential.           Immature Granulocytes 0.1         Lymph # 1.5         Lymph % 21.8         Magnesium  2.0         MCH 28.3         MCHC 32.0         MCV 89         Mono # 0.6         Mono % 9.2         MPV 10.2         nRBC 0         Platelet Count 273         Potassium 3.9         PROTEIN TOTAL 6.0         RBC 4.34         RDW 14.1         Sodium 137         Troponin I   0.164  Comment: The reference interval for Troponin I represents the 99th percentile   cutoff   for our facility and is consistent with 3rd generation assay   performance.         WBC 6.88                 Significant Imaging: I have reviewed all pertinent imaging results/findings within the past 24 hours.

## 2025-01-25 NOTE — PROGRESS NOTES
[facility] Medicine  Progress Note    Patient Name: Jose Latham  MRN: [id number]  Patient Class: IP- Inpatient   Admission Date: 1/24/2025  Length of Stay: 1 days  Attending Physician: Sean Vázquez MD  Primary Care Provider: Ron Calle MD        Subjective     Principal Problem:Atrial fibrillation        HPI:  The patient is a 89 yo male with past medical history of arthritis, hypertension and melanoma who presented to the ED with shortness of breath. He reports symptoms started a week before presentation. He reports he is pretty active. He plays golf and dances on a regular basis. He had some cold symptoms but that has resolved. He denies chest pain. Workup in the ED revealed atrial fibrillation with RVR. Cardiology and hospital medicine consulted.     Overview/Hospital Course:  No notes on file    Interval History: f/u atrial fibrillation rate fluctuating. Discussed awaiting cardiology evaluation for adjustment of medications    Review of Systems  Objective:     Vital Signs (Most Recent):  Temp: 97.3 °F (36.3 °C) (01/25/25 1154)  Pulse: 108 (01/25/25 1154)  Resp: 20 (01/25/25 1154)  BP: (!) 126/90 (01/25/25 1154)  SpO2: (!) 94 % (01/25/25 1154) Vital Signs (24h Range):  Temp:  [97.3 °F (36.3 °C)-98.2 °F (36.8 °C)] 97.3 °F (36.3 °C)  Pulse:  [] 108  Resp:  [20-22] 20  SpO2:  [92 %-97 %] 94 %  BP: (117-158)/() 126/90     Weight: 97.8 kg (215 lb 9.8 oz)  Body mass index is 29.24 kg/m².    Intake/Output Summary (Last 24 hours) at 1/25/2025 1520  Last data filed at 1/25/2025 1130  Gross per 24 hour   Intake --   Output 1600 ml   Net -1600 ml         Physical Exam  HENT:      Head: Normocephalic and atraumatic.   Cardiovascular:      Rate and Rhythm: Tachycardia present. Rhythm irregular.      Heart sounds: No murmur heard.  Pulmonary:      Effort: Pulmonary effort is normal. No respiratory distress.      Breath sounds: Normal breath sounds. No wheezing.    Abdominal:      General: Bowel sounds are normal. There is no distension.      Palpations: Abdomen is soft.      Tenderness: There is no abdominal tenderness.   Musculoskeletal:         General: Swelling present.      Right lower leg: Edema present.      Left lower leg: Edema present.   Skin:     General: Skin is warm and dry.   Neurological:      Mental Status: He is alert and oriented to person, place, and time. Mental status is at baseline.             Significant Labs: All pertinent labs within the past 24 hours have been reviewed.  Recent Lab Results         01/25/25  0434   01/24/25  1856        Albumin 3.3         ALP 54         ALT 21         Anion Gap 10         AST 20         Baso # 0.09         Basophil % 1.3         BILIRUBIN TOTAL 1.1  Comment: For infants and newborns, interpretation of results should be based  on gestational age, weight and in agreement with clinical  observations.    Premature Infant recommended reference ranges:  Up to 24 hours.............<8.0 mg/dL  Up to 48 hours............<12.0 mg/dL  3-5 days..................<15.0 mg/dL  6-29 days.................<15.0 mg/dL           BUN 20         Calcium 8.8         Chloride 105         CO2 22         Creatinine 1.1         Differential Method Automated         eGFR >60         Eos # 0.2         Eos % 2.3         Glucose 96         Gran # (ANC) 4.5         Gran % 65.3         Hematocrit 38.4         Hemoglobin 12.3         Immature Grans (Abs) 0.01  Comment: Mild elevation in immature granulocytes is non specific and   can be seen in a variety of conditions including stress response,   acute inflammation, trauma and pregnancy. Correlation with other   laboratory and clinical findings is essential.           Immature Granulocytes 0.1         Lymph # 1.5         Lymph % 21.8         Magnesium  2.0         MCH 28.3         MCHC 32.0         MCV 89         Mono # 0.6         Mono % 9.2         MPV 10.2         nRBC 0         Platelet Count  273         Potassium 3.9         PROTEIN TOTAL 6.0         RBC 4.34         RDW 14.1         Sodium 137         Troponin I   0.164  Comment: The reference interval for Troponin I represents the 99th percentile   cutoff   for our facility and is consistent with 3rd generation assay   performance.         WBC 6.88                 Significant Imaging: I have reviewed all pertinent imaging results/findings within the past 24 hours.    Assessment and Plan     * Atrial fibrillation  Patient has  new onset  atrial fibrillation. Patient is currently in atrial fibrillation. [id number] Score: 2. The patients heart rate in the last 24 hours is as follows:  Pulse  Min: 96  Max: 123     Antiarrhythmics  metoprolol tartrate (LOPRESSOR) tablet 25 mg, 2 times daily, Oral    Anticoagulants       Plan  - Replete lytes with a goal of K>4, Mg >2  - Patient is not anticoagulated due to defer to cardiology  -cardiology consulted        Systolic heart failure  -unclear of chronicity  -give dose of lasix  -cardiology consulted.       Elevated troponin  -denies chest pain  -may be demand from afib with RVR      Essential hypertension  Patient's blood pressure range in the last 24 hours was: BP  Min: 106/84  Max: 149/75.The patient's inpatient anti-hypertensive regimen is listed below:  Current Antihypertensives  metoprolol tartrate (LOPRESSOR) tablet 25 mg, 2 times daily, Oral    Plan  - BP is controlled, no changes needed to their regimen  - hold home losartan for now      VTE Risk Mitigation (From admission, onward)           Ordered     IP VTE HIGH RISK PATIENT  Once         01/24/25 1204     Place sequential compression device  Until discontinued         01/24/25 1204                    Discharge Planning   TIM:      Code Status: Full Code   Medical Readiness for Discharge Date:   Discharge Plan A: Home                        Sean Vázquez MD  Department of Hospital Medicine   [facility] - Telemetry ([facility])

## 2025-01-25 NOTE — HPI
The patient is a 89 yo male with past medical history of arthritis, hypertension and melanoma who presented to the ED with shortness of breath. He reports symptoms started a week before presentation. He reports he is pretty active. He plays golf and dances on a regular basis. He had some cold symptoms but that has resolved. He denies chest pain. Workup in the ED revealed atrial fibrillation with RVR.     Cardiology consult for afib with RVR and CHF rEF exacerbation  C/o sob and palpitation for a week  In ER, EKG showed new afib with RVR  Echo showed EF 30% and normal LV size  Bnp 873  Troponin 0/164 flat  Cr 1.1  HGB 12.3

## 2025-01-26 LAB
ALBUMIN SERPL BCP-MCNC: 3.3 G/DL (ref 3.5–5.2)
ALP SERPL-CCNC: 59 U/L (ref 40–150)
ALT SERPL W/O P-5'-P-CCNC: 27 U/L (ref 10–44)
ANION GAP SERPL CALC-SCNC: 14 MMOL/L (ref 8–16)
APTT PPP: 42.1 SEC (ref 21–32)
APTT PPP: 43.4 SEC (ref 21–32)
APTT PPP: 75.9 SEC (ref 21–32)
AST SERPL-CCNC: 26 U/L (ref 10–40)
BASOPHILS # BLD AUTO: 0.11 K/UL (ref 0–0.2)
BASOPHILS NFR BLD: 1.4 % (ref 0–1.9)
BILIRUB SERPL-MCNC: 0.7 MG/DL (ref 0.1–1)
BUN SERPL-MCNC: 18 MG/DL (ref 8–23)
CALCIUM SERPL-MCNC: 8.9 MG/DL (ref 8.7–10.5)
CHLORIDE SERPL-SCNC: 103 MMOL/L (ref 95–110)
CO2 SERPL-SCNC: 18 MMOL/L (ref 23–29)
CREAT SERPL-MCNC: 1 MG/DL (ref 0.5–1.4)
DIFFERENTIAL METHOD BLD: ABNORMAL
EOSINOPHIL # BLD AUTO: 0.1 K/UL (ref 0–0.5)
EOSINOPHIL NFR BLD: 1.7 % (ref 0–8)
ERYTHROCYTE [DISTWIDTH] IN BLOOD BY AUTOMATED COUNT: 14 % (ref 11.5–14.5)
EST. GFR  (NO RACE VARIABLE): >60 ML/MIN/1.73 M^2
GLUCOSE SERPL-MCNC: 112 MG/DL (ref 70–110)
HCT VFR BLD AUTO: 40.8 % (ref 40–54)
HGB BLD-MCNC: 13.3 G/DL (ref 14–18)
IMM GRANULOCYTES # BLD AUTO: 0.01 K/UL (ref 0–0.04)
IMM GRANULOCYTES NFR BLD AUTO: 0.1 % (ref 0–0.5)
LYMPHOCYTES # BLD AUTO: 2.2 K/UL (ref 1–4.8)
LYMPHOCYTES NFR BLD: 27.6 % (ref 18–48)
MAGNESIUM SERPL-MCNC: 1.9 MG/DL (ref 1.6–2.6)
MCH RBC QN AUTO: 28.6 PG (ref 27–31)
MCHC RBC AUTO-ENTMCNC: 32.6 G/DL (ref 32–36)
MCV RBC AUTO: 88 FL (ref 82–98)
MONOCYTES # BLD AUTO: 0.7 K/UL (ref 0.3–1)
MONOCYTES NFR BLD: 8.6 % (ref 4–15)
NEUTROPHILS # BLD AUTO: 4.7 K/UL (ref 1.8–7.7)
NEUTROPHILS NFR BLD: 60.6 % (ref 38–73)
NRBC BLD-RTO: 0 /100 WBC
OHS QRS DURATION: 90 MS
OHS QTC CALCULATION: 513 MS
PLATELET # BLD AUTO: 268 K/UL (ref 150–450)
PMV BLD AUTO: 9.9 FL (ref 9.2–12.9)
POTASSIUM SERPL-SCNC: 3.4 MMOL/L (ref 3.5–5.1)
PROT SERPL-MCNC: 6.4 G/DL (ref 6–8.4)
RBC # BLD AUTO: 4.65 M/UL (ref 4.6–6.2)
SODIUM SERPL-SCNC: 135 MMOL/L (ref 136–145)
WBC # BLD AUTO: 7.79 K/UL (ref 3.9–12.7)

## 2025-01-26 PROCEDURE — 80053 COMPREHEN METABOLIC PANEL: CPT | Performed by: INTERNAL MEDICINE

## 2025-01-26 PROCEDURE — 27000221 HC OXYGEN, UP TO 24 HOURS

## 2025-01-26 PROCEDURE — 99233 SBSQ HOSP IP/OBS HIGH 50: CPT | Mod: ,,, | Performed by: INTERNAL MEDICINE

## 2025-01-26 PROCEDURE — 83735 ASSAY OF MAGNESIUM: CPT | Performed by: INTERNAL MEDICINE

## 2025-01-26 PROCEDURE — 25000003 PHARM REV CODE 250: Performed by: INTERNAL MEDICINE

## 2025-01-26 PROCEDURE — 36415 COLL VENOUS BLD VENIPUNCTURE: CPT | Performed by: INTERNAL MEDICINE

## 2025-01-26 PROCEDURE — 85730 THROMBOPLASTIN TIME PARTIAL: CPT | Performed by: INTERNAL MEDICINE

## 2025-01-26 PROCEDURE — 85730 THROMBOPLASTIN TIME PARTIAL: CPT | Mod: 91 | Performed by: INTERNAL MEDICINE

## 2025-01-26 PROCEDURE — 85025 COMPLETE CBC W/AUTO DIFF WBC: CPT | Performed by: INTERNAL MEDICINE

## 2025-01-26 PROCEDURE — 21400001 HC TELEMETRY ROOM

## 2025-01-26 PROCEDURE — 63600175 PHARM REV CODE 636 W HCPCS: Performed by: INTERNAL MEDICINE

## 2025-01-26 RX ORDER — LOSARTAN POTASSIUM 25 MG/1
25 TABLET ORAL DAILY
Status: DISCONTINUED | OUTPATIENT
Start: 2025-01-26 | End: 2025-01-26

## 2025-01-26 RX ORDER — POTASSIUM CHLORIDE 20 MEQ/1
40 TABLET, EXTENDED RELEASE ORAL ONCE
Status: COMPLETED | OUTPATIENT
Start: 2025-01-26 | End: 2025-01-26

## 2025-01-26 RX ORDER — SPIRONOLACTONE 25 MG/1
25 TABLET ORAL DAILY
Status: DISCONTINUED | OUTPATIENT
Start: 2025-01-26 | End: 2025-01-28 | Stop reason: HOSPADM

## 2025-01-26 RX ORDER — SACUBITRIL AND VALSARTAN 24; 26 MG/1; MG/1
1 TABLET, FILM COATED ORAL 2 TIMES DAILY
Status: DISCONTINUED | OUTPATIENT
Start: 2025-01-27 | End: 2025-01-28 | Stop reason: HOSPADM

## 2025-01-26 RX ADMIN — POTASSIUM CHLORIDE 40 MEQ: 1500 TABLET, EXTENDED RELEASE ORAL at 03:01

## 2025-01-26 RX ADMIN — ASPIRIN 81 MG CHEWABLE TABLET 81 MG: 81 TABLET CHEWABLE at 08:01

## 2025-01-26 RX ADMIN — AMIODARONE HYDROCHLORIDE 0.5 MG/MIN: 1.8 INJECTION, SOLUTION INTRAVENOUS at 01:01

## 2025-01-26 RX ADMIN — HEPARIN SODIUM 10 UNITS/KG/HR: 10000 INJECTION, SOLUTION INTRAVENOUS at 06:01

## 2025-01-26 RX ADMIN — Medication 6 MG: at 08:01

## 2025-01-26 RX ADMIN — METOPROLOL TARTRATE 25 MG: 25 TABLET, FILM COATED ORAL at 08:01

## 2025-01-26 RX ADMIN — LOSARTAN POTASSIUM 25 MG: 25 TABLET, FILM COATED ORAL at 09:01

## 2025-01-26 RX ADMIN — HEPARIN SODIUM 10 UNITS/KG/HR: 10000 INJECTION, SOLUTION INTRAVENOUS at 01:01

## 2025-01-26 RX ADMIN — SPIRONOLACTONE 25 MG: 25 TABLET, FILM COATED ORAL at 11:01

## 2025-01-26 RX ADMIN — FUROSEMIDE 20 MG: 10 INJECTION, SOLUTION INTRAMUSCULAR; INTRAVENOUS at 08:01

## 2025-01-26 NOTE — ASSESSMENT & PLAN NOTE
New Dx of Afib with RVR  CHFrEF 30%      -- add amio bolus and gtt  -- add Heparin gtt   -- continue BB    01/26/2025  AFIB VRC  Switch amio gtt to 20 mg bid  Consider to switch to OAC before d/c  Continue BB and ARB

## 2025-01-26 NOTE — CONSULTS
[facility] - Tele[facility] ([facility])  Cardiology  Consult Note    Patient Name: Jose Latham  MRN: [id number]  Admission Date: 1/24/2025  Hospital Length of Stay: 1 days  Code Status: Full Code   Attending Provider: Sean Vázquez MD   Consulting Provider: Jim Puente MD  Primary Care Physician: Ron Calle MD  Principal Problem:Atrial fibrillation    Patient information was obtained from patient and ER records.     Inpatient consult to Cardiology  Consult performed by: Jim Puente MD  Consult ordered by: Sean Vázquez MD        Subjective:       HPI:   The patient is a 89 yo male with past medical history of arthritis, hypertension and melanoma who presented to the ED with shortness of breath. He reports symptoms started a week before presentation. He reports he is pretty active. He plays golf and dances on a regular basis. He had some cold symptoms but that has resolved. He denies chest pain. Workup in the ED revealed atrial fibrillation with RVR.     Cardiology consult for afib with RVR and CHF rEF exacerbation  C/o sob and palpitation for a week  In ER, EKG showed new afib with RVR  Echo showed EF 30% and normal LV size  Bnp 873  Troponin 0/164 flat  Cr 1.1  HGB 12.3              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    Melanoma     left leg, removed years ago    Nuclear sclerosis - Both Eyes 8/21/2013       Past Surgical History:   Procedure Laterality Date    ADENOIDECTOMY      CATARACT EXTRACTION W/  INTRAOCULAR LENS IMPLANT Right 05/16/2018        CATARACT EXTRACTION W/  INTRAOCULAR LENS IMPLANT Left 07/18/2018        INTRAOCULAR PROSTHESES INSERTION Left 7/18/2018    Procedure: INSERTION, INTRAOCULAR LENS PROSTHESIS;  Surgeon: Mari Dobson MD;  Location: [facility] OR [address] [place] [address];  Service: Ophthalmology;  Laterality: Left;    MALIGNANT SKIN LESION EXCISION      PHACOEMULSIFICATION OF  CATARACT Left 2018    Procedure: PHACOEMULSIFICATION, CATARACT;  Surgeon: Mari Dobson MD;  Location: [facility] OR [address] [place] [address];  Service: Ophthalmology;  Laterality: Left;    TONSILLECTOMY      YAG CAPSULOTOMY Right 2021           Review of patient's allergies indicates:  No Known Allergies    No current facility-administered medications on file prior to encounter.     Current Outpatient Medications on File Prior to Encounter   Medication Sig    aspirin 81 MG Chew Take 81 mg by mouth once daily.    losartan (COZAAR) 25 MG tablet TAKE 1 TABLET BY MOUTH EVERY DAY (Patient taking differently: Take 25 mg by mouth once daily.)    melatonin 3 mg Tab Take 3 mg by mouth every evening.     meloxicam (MOBIC) 15 MG tablet Take 1 tablet (15 mg total) by mouth once daily.    multivit-min-FA-lycopen-lutein 0.4-300-250 mg-mcg-mcg Tab Take 1 tablet by mouth once daily.      Family History       Problem Relation (Age of Onset)    Alcohol abuse Father    Aneurysm Sister    Cancer Sister    Heart disease Sister    Hypertension Sister    No Known Problems Mother, Maternal Aunt, Maternal Uncle, Paternal Aunt, Paternal Uncle, Maternal Grandmother, Maternal Grandfather, Paternal Grandmother, Paternal Grandfather, Son    Stomach cancer Sister    Stroke Sister          Tobacco Use    Smoking status: Former     Current packs/day: 0.00     Average packs/day: 1 pack/day for 15.0 years (15.0 ttl pk-yrs)     Types: Cigarettes     Start date: 5/15/1949     Quit date: 5/15/1964     Years since quittin.7     Passive exposure: Past    Smokeless tobacco: Never   Substance and Sexual Activity    Alcohol use: Yes     Alcohol/week: 2.0 standard drinks of alcohol     Types: 2 Cans of beer per week    Drug use: No    Sexual activity: Not Currently     Partners: Female     Review of Systems   Constitutional: Negative for decreased appetite, diaphoresis, fever, malaise/fatigue and night sweats.   HENT:  Negative for nosebleeds.     Eyes:  Negative for blurred vision and double vision.   Cardiovascular:  Positive for dyspnea on exertion, orthopnea and palpitations. Negative for chest pain, claudication, irregular heartbeat, leg swelling, near-syncope, paroxysmal nocturnal dyspnea and syncope.   Respiratory:  Negative for cough, shortness of breath, sleep disturbances due to breathing, snoring, sputum production and wheezing.    Endocrine: Negative for cold intolerance and polyuria.   Hematologic/Lymphatic: Does not bruise/bleed easily.   Skin:  Negative for rash.   Musculoskeletal:  Negative for back pain, falls, joint pain, joint swelling and neck pain.   Gastrointestinal:  Negative for abdominal pain, heartburn, nausea and vomiting.   Genitourinary:  Negative for dysuria, frequency and hematuria.   Neurological:  Negative for difficulty with concentration, dizziness, focal weakness, headaches, light-headedness, numbness, seizures and weakness.   Psychiatric/Behavioral:  Negative for depression, memory loss and substance abuse. The patient does not have insomnia.    Allergic/Immunologic: Negative for HIV exposure and hives.     Objective:     Vital Signs (Most Recent):  Temp: 97.4 °F (36.3 °C) (01/25/25 1612)  Pulse: (!) 114 (01/25/25 1612)  Resp: 20 (01/25/25 1612)  BP: (!) 151/97 (01/25/25 1612)  SpO2: 98 % (01/25/25 1612) Vital Signs (24h Range):  Temp:  [97.3 °F (36.3 °C)-98.2 °F (36.8 °C)] 97.4 °F (36.3 °C)  Pulse:  [] 114  Resp:  [20-22] 20  SpO2:  [92 %-98 %] 98 %  BP: (117-158)/() 151/97     Weight: 97.8 kg (215 lb 9.8 oz)  Body mass index is 29.24 kg/m².    SpO2: 98 %         Intake/Output Summary (Last 24 hours) at 1/25/2025 1808  Last data filed at 1/25/2025 1615  Gross per 24 hour   Intake --   Output 2025 ml   Net -2025 ml       Lines/Drains/Airways       Peripheral Intravenous Line  Duration                  Peripheral IV - Single Lumen 01/25/25 0946 20 G Right Antecubital <1 day                     Physical  "Exam  HENT:      Head: Normocephalic.   Eyes:      Pupils: Pupils are equal, round, and reactive to light.   Neck:      Thyroid: No thyromegaly.      Vascular: Normal carotid pulses. JVD present. No carotid bruit.   Cardiovascular:      Rate and Rhythm: Tachycardia present. Rhythm irregularly irregular. No extrasystoles are present.     Chest Wall: PMI is not displaced.      Pulses: Normal pulses.      Heart sounds: Normal heart sounds. No murmur heard.     No gallop. No S3 sounds.   Pulmonary:      Effort: No respiratory distress.      Breath sounds: Normal breath sounds. No stridor.   Abdominal:      General: Bowel sounds are normal.      Palpations: Abdomen is soft.      Tenderness: There is no abdominal tenderness. There is no rebound.   Skin:     Findings: No rash.   Neurological:      Mental Status: He is alert and oriented to person, place, and time.   Psychiatric:         Behavior: Behavior normal.          Significant Labs: ABG: No results for input(s): "PH", "PCO2", "HCO3", "POCSATURATED", "BE" in the last 48 hours., Blood Culture: No results for input(s): "LABBLOO" in the last 48 hours., BMP:   Recent Labs   Lab 01/24/25  0912 01/25/25  0434    96    137   K 3.8 3.9    105   CO2 21* 22*   BUN 19 20   CREATININE 1.2 1.1   CALCIUM 9.5 8.8   MG 2.1 2.0   , CMP   Recent Labs   Lab 01/24/25  0912 01/25/25  0434    137   K 3.8 3.9    105   CO2 21* 22*    96   BUN 19 20   CREATININE 1.2 1.1   CALCIUM 9.5 8.8   PROT 7.4 6.0   ALBUMIN 3.9 3.3*   BILITOT 1.0 1.1*   ALKPHOS 67 54   AST 24 20   ALT 29 21   ANIONGAP 12 10   , CBC   Recent Labs   Lab 01/24/25  0912 01/25/25  0434   WBC 7.92 6.88   HGB 14.2 12.3*   HCT 43.9 38.4*    273   , INR   Recent Labs   Lab 01/24/25  0912   INR 1.0   , Lipid Panel No results for input(s): "CHOL", "HDL", "LDLCALC", "TRIG", "CHOLHDL" in the last 48 hours., and Troponin No results for input(s): "TROPONINIHS" in the last 48 " hours.    Significant Imaging: EKG:    Assessment and Plan:     * Atrial fibrillation  New Dx of Afib with RVR  CHFrEF 30%      -- add amio bolus and gtt  -- add Heparin gtt   -- continue BB    Systolic heart failure  CHFrEF new dx    Continue amio gtt for afib  Continue BB  Add gentle diuresis with lasix 20 mg ivp daily      Elevated troponin  Demand ischemia >> ACS    Ischemia workup once afib and CHFrEF controlled          VTE Risk Mitigation (From admission, onward)           Ordered     heparin 25,000 units in dextrose 5% (100 units/ml) IV bolus from bag LOW INTENSITY nomogram - [facility]  As needed (PRN)        Question:  Heparin Infusion Adjustment (DO NOT MODIFY ANSWER)  Answer:  [email or web address]\epic\Images\Pharmacy\HeparinInfusions\heparin LOW INTENSITY nomogram for [facility] [id number]    01/25/25 1807     heparin 25,000 units in dextrose 5% (100 units/ml) IV bolus from bag LOW INTENSITY nomogram - [facility]  As needed (PRN)        Question:  Heparin Infusion Adjustment (DO NOT MODIFY ANSWER)  Answer:  [email or web address]\epic\Images\Pharmacy\HeparinInfusions\heparin LOW INTENSITY nomogram for [facility] [id number]    01/25/25 1807     heparin 25,000 units in dextrose 5% (100 units/ml) IV bolus from bag LOW INTENSITY nomogram - [facility]  Once        Question:  Heparin Infusion Adjustment (DO NOT MODIFY ANSWER)  Answer:  [email or web address]\epic\Images\Pharmacy\HeparinInfusions\heparin LOW INTENSITY nomogram for [facility] [id number]    01/25/25 1807     heparin 25,000 units in dextrose 5% 250 mL (100 units/mL) infusion LOW INTENSITY nomogram - [facility]  Continuous        Question:  Begin at (units/kg/hr)  Answer:  12    01/25/25 1807     IP VTE HIGH RISK PATIENT  Once         01/24/25 1204     Place sequential compression device  Until discontinued         01/24/25 1204                    Thank you for your consult. I will follow-up with patient. Please contact us if you have any additional questions.    Jim Puente MD  Cardiology   [facility] - Telemetry  ([facility])

## 2025-01-26 NOTE — ASSESSMENT & PLAN NOTE
CHFrEF new dx    Continue amio gtt for afib  Continue BB  Add gentle diuresis with lasix 20 mg ivp daily    01/26/2025  Switch losartan to entresto 24/26 mg bid  Add aldactone 25 mg   Continue toproXL and Lasix 20 mg ivp daily  Fluid restriction 1.5 l a day  Ischemia work up as op if indicated

## 2025-01-26 NOTE — ASSESSMENT & PLAN NOTE
Patient's blood pressure range in the last 24 hours was: BP  Min: 101/75  Max: 163/94.The patient's inpatient anti-hypertensive regimen is listed below:  Current Antihypertensives  metoprolol tartrate (LOPRESSOR) tablet 25 mg, 2 times daily, Oral  furosemide injection 20 mg, Daily, Intravenous  spironolactone tablet 25 mg, Daily, Oral    Plan  - BP is controlled, no changes needed to their regimen  - losartan changed to Entresto

## 2025-01-26 NOTE — HOSPITAL COURSE
The patient presented with shortness of breath. He was found to be atrial fibrillation with RVR. He was placed on metoprolol. Cardiology was consulted. Echo obtained and revealed EF of 30%. He was placed on IV lasix daily. Cardiology recommended amiodarone and heparin gtts as rate not controlled.   1/27 remains on amio gtt. Cardiology further adjusting intravenous lasix. Does not wear supplemental oxygen at baseline. Endorses improvement in dyspnea symptoms.     1/28  No longer requiring supplemental oxygen. Improvement in sleep disturbance overnight. Heart rate controlled on po amio and beta blocker. After discussing with cardiology, ok to discharge home on current regimen.    No acute distress. No respiratory distress. On room air. Heart rate 80-90s, controlled. AO3.    Patient seen and evaluated by me. Patient was determined to be suitable for discharge. Patient deemed stable for discharge to home with nurse practitioner to visit home program to monitor blood pressures, follow up appointments, and medication(s) compliance. No k supplemental prescribed as on entresto and spironolactone.

## 2025-01-26 NOTE — PROGRESS NOTES
[facility] Medicine  Progress Note    Patient Name: Jose Latham  MRN: [id number]  Patient Class: IP- Inpatient   Admission Date: 1/24/2025  Length of Stay: 2 days  Attending Physician: Sean Vázquez MD  Primary Care Provider: Ron Calle MD        Subjective     Principal Problem:Atrial fibrillation        HPI:  The patient is a 89 yo male with past medical history of arthritis, hypertension and melanoma who presented to the ED with shortness of breath. He reports symptoms started a week before presentation. He reports he is pretty active. He plays golf and dances on a regular basis. He had some cold symptoms but that has resolved. He denies chest pain. Workup in the ED revealed atrial fibrillation with RVR. Cardiology and hospital medicine consulted.     Overview/Hospital Course:  The patient presented with shortness of breath. He was found to be atrial fibrillation with RVR. He was placed on metoprolol. Cardiology was consulted. Echo obtained and revealed EF of 30%. He was placed on IV lasix daily. Cardiology recommended amiodarone and heparin gtts as rate not controlled.     Interval History: f/u atrial fibrillation  tachycardic and short of breath when getting out of bed. Discussed echo findings. Plan rate control. Further workup to determine why EF is low after rate controlled.    Review of Systems  Objective:     Vital Signs (Most Recent):  Temp: 97.3 °F (36.3 °C) (01/26/25 1148)  Pulse: 95 (01/26/25 1148)  Resp: 16 (01/26/25 1148)  BP: 123/87 (01/26/25 1148)  SpO2: 95 % (01/26/25 1148) Vital Signs (24h Range):  Temp:  [97.3 °F (36.3 °C)-98 °F (36.7 °C)] 97.3 °F (36.3 °C)  Pulse:  [] 95  Resp:  [14-20] 16  SpO2:  [94 %-99 %] 95 %  BP: (101-163)/(70-97) 123/87     Weight: 95.3 kg (210 lb 1.6 oz)  Body mass index is 28.49 kg/m².    Intake/Output Summary (Last 24 hours) at 1/26/2025 1415  Last data filed at 1/26/2025 0623  Gross per 24 hour   Intake 532.35 ml    Output 1795 ml   Net -1262.65 ml         Physical Exam  HENT:      Head: Normocephalic and atraumatic.   Cardiovascular:      Rate and Rhythm: Tachycardia present. Rhythm irregular.      Heart sounds: No murmur heard.  Pulmonary:      Effort: Pulmonary effort is normal. No respiratory distress.      Breath sounds: Normal breath sounds. No wheezing.   Abdominal:      General: Bowel sounds are normal. There is no distension.      Palpations: Abdomen is soft.      Tenderness: There is no abdominal tenderness.   Musculoskeletal:         General: No swelling.   Skin:     General: Skin is warm and dry.   Neurological:      Mental Status: He is alert and oriented to person, place, and time. Mental status is at baseline.             Significant Labs: All pertinent labs within the past 24 hours have been reviewed.  Recent Lab Results         01/26/25  0805   01/26/25  0439   01/26/25  0029   01/25/25  1825        Albumin   3.3           ALP   59           ALT   27           Anion Gap   14           PTT 43.4  Comment: Refer to local heparin nomogram for intensity/dose specific   therapeutic   range.       75.9  Comment: Refer to local heparin nomogram for intensity/dose specific   therapeutic   range.  Reviewed by Technologist.     26.5  Comment: Refer to local heparin nomogram for intensity/dose specific   therapeutic   range.         AST   26           Baso #   0.11           Basophil %   1.4           BILIRUBIN TOTAL   0.7  Comment: For infants and newborns, interpretation of results should be based  on gestational age, weight and in agreement with clinical  observations.    Premature Infant recommended reference ranges:  Up to 24 hours.............<8.0 mg/dL  Up to 48 hours............<12.0 mg/dL  3-5 days..................<15.0 mg/dL  6-29 days.................<15.0 mg/dL             BUN   18           Calcium   8.9           Chloride   103           CO2   18           Creatinine   1.0           Differential Method    Automated           eGFR   >60           Eos #   0.1           Eos %   1.7           Glucose   112           Gran # (ANC)   4.7           Gran %   60.6           Hematocrit   40.8           Hemoglobin   13.3           Immature Grans (Abs)   0.01  Comment: Mild elevation in immature granulocytes is non specific and   can be seen in a variety of conditions including stress response,   acute inflammation, trauma and pregnancy. Correlation with other   laboratory and clinical findings is essential.             Immature Granulocytes   0.1           INR       1.0  Comment: Coumadin Therapy:  2.0 - 3.0 for INR for all indicators except mechanical heart valves  and antiphospholipid syndromes which should use 2.5 - 3.5.         Lymph #   2.2           Lymph %   27.6           Magnesium    1.9           MCH   28.6           MCHC   32.6           MCV   88           Mono #   0.7           Mono %   8.6           MPV   9.9           nRBC   0           Platelet Count   268           Potassium   3.4           PROTEIN TOTAL   6.4           PT       11.6       RBC   4.65           RDW   14.0           Sodium   135           WBC   7.79                   Significant Imaging: I have reviewed all pertinent imaging results/findings within the past 24 hours.    Assessment and Plan     * Atrial fibrillation  Patient has  new onset  atrial fibrillation. Patient is currently in atrial fibrillation. [id number] Score: 2. The patients heart rate in the last 24 hours is as follows:  Pulse  Min: 88  Max: 124     Antiarrhythmics  metoprolol tartrate (LOPRESSOR) tablet 25 mg, 2 times daily, Oral  amiodarone 360 mg/200 mL (1.8 mg/mL) infusion, Continuous, Intravenous    Anticoagulants  heparin 25,000 units in dextrose 5% 250 mL (100 units/mL) infusion LOW INTENSITY nomogram - [facility], Continuous, Intravenous  heparin 25,000 units in dextrose 5% (100 units/ml) IV bolus from bag LOW INTENSITY nomogram - [facility], As needed (PRN), Intravenous  heparin 25,000  units in dextrose 5% (100 units/ml) IV bolus from bag LOW INTENSITY nomogram - [facility], As needed (PRN), Intravenous    Plan  - Replete lytes with a goal of K>4, Mg >2  - Patient is anticoagulated, see medications listed above.  -cardiology consulted        Systolic heart failure  -new diagnosis  -IV lasix  -possible outpatient ischemic workup  -Entresto added  -continue metoprolol      Elevated troponin  -denies chest pain  -may be demand from afib with RVR      Essential hypertension  Patient's blood pressure range in the last 24 hours was: BP  Min: 101/75  Max: 163/94.The patient's inpatient anti-hypertensive regimen is listed below:  Current Antihypertensives  metoprolol tartrate (LOPRESSOR) tablet 25 mg, 2 times daily, Oral  furosemide injection 20 mg, Daily, Intravenous  spironolactone tablet 25 mg, Daily, Oral    Plan  - BP is controlled, no changes needed to their regimen  - losartan changed to Entresto      VTE Risk Mitigation (From admission, onward)           Ordered     heparin 25,000 units in dextrose 5% (100 units/ml) IV bolus from bag LOW INTENSITY nomogram - [facility]  As needed (PRN)        Question:  Heparin Infusion Adjustment (DO NOT MODIFY ANSWER)  Answer:  \\[email or web address]\epic\Images\Pharmacy\HeparinInfusions\heparin LOW INTENSITY nomogram for [facility] [id number]    01/25/25 1807     heparin 25,000 units in dextrose 5% (100 units/ml) IV bolus from bag LOW INTENSITY nomogram - [facility]  As needed (PRN)        Question:  Heparin Infusion Adjustment (DO NOT MODIFY ANSWER)  Answer:  \\[email or web address]\epic\Images\Pharmacy\HeparinInfusions\heparin LOW INTENSITY nomogram for [facility] [id number]    01/25/25 1807     heparin 25,000 units in dextrose 5% 250 mL (100 units/mL) infusion LOW INTENSITY nomogram - [facility]  Continuous        Question:  Begin at (units/kg/hr)  Answer:  12    01/25/25 1807     IP VTE HIGH RISK PATIENT  Once         01/24/25 1204     Place sequential compression device  Until discontinued         01/24/25 1204                     Discharge Planning   TIM:      Code Status: Full Code   Medical Readiness for Discharge Date:   Discharge Plan A: Home                        Sean Vázquez MD  Department of Hospital Medicine   [facility] ([facility])

## 2025-01-26 NOTE — HOSPITAL COURSE
Cardiology consult for afib with RVR and CHF rEF exacerbation  C/o sob and palpitation for a week  In ER, EKG showed new afib with RVR  Echo showed EF 30% and normal LV size  Bnp 873  Troponin 0/164 flat  Cr 1.1  HGB 12.3    01/26/2025  Feels better. Add Amio gtt yesterday. AFIB and now VR Controlled. BP controlled and Cr 1.0 stable I&O -4.7 liters since admission    1-27-25 HR low 100s, when up and active 130s and SOB. BP stable. Will increase b blockers and IV lasix.  Will also change heparin to eliquis    1-28-25  HR improved with increased b blockers. Currently afib 80-90 . Up and ambulate, amiodarone changed to po. If no symptoms ambulating, can go home today.

## 2025-01-26 NOTE — SUBJECTIVE & OBJECTIVE
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    Melanoma     left leg, removed years ago    Nuclear sclerosis - Both Eyes 8/21/2013       Past Surgical History:   Procedure Laterality Date    ADENOIDECTOMY      CATARACT EXTRACTION W/  INTRAOCULAR LENS IMPLANT Right 05/16/2018        CATARACT EXTRACTION W/  INTRAOCULAR LENS IMPLANT Left 07/18/2018        INTRAOCULAR PROSTHESES INSERTION Left 7/18/2018    Procedure: INSERTION, INTRAOCULAR LENS PROSTHESIS;  Surgeon: Mari Dobson MD;  Location: [facility] OR [address] [place] [address];  Service: Ophthalmology;  Laterality: Left;    MALIGNANT SKIN LESION EXCISION      PHACOEMULSIFICATION OF CATARACT Left 7/18/2018    Procedure: PHACOEMULSIFICATION, CATARACT;  Surgeon: Mari Dobson MD;  Location: [facility] OR [address] [place] [address];  Service: Ophthalmology;  Laterality: Left;    TONSILLECTOMY      YAG CAPSULOTOMY Right 05/20/2021           Review of patient's allergies indicates:  No Known Allergies    No current facility-administered medications on file prior to encounter.     Current Outpatient Medications on File Prior to Encounter   Medication Sig    aspirin 81 MG Chew Take 81 mg by mouth once daily.    losartan (COZAAR) 25 MG tablet TAKE 1 TABLET BY MOUTH EVERY DAY (Patient taking differently: Take 25 mg by mouth once daily.)    melatonin 3 mg Tab Take 3 mg by mouth every evening.     meloxicam (MOBIC) 15 MG tablet Take 1 tablet (15 mg total) by mouth once daily.    multivit-min-FA-lycopen-lutein 0.4-300-250 mg-mcg-mcg Tab Take 1 tablet by mouth once daily.      Family History       Problem Relation (Age of Onset)    Alcohol abuse Father    Aneurysm Sister    Cancer Sister    Heart disease Sister    Hypertension Sister    No Known Problems Mother, Maternal Aunt, Maternal Uncle, Paternal Aunt, Paternal Uncle, Maternal Grandmother, Maternal Grandfather, Paternal Grandmother, Paternal  Grandfather, Son    Stomach cancer Sister    Stroke Sister          Tobacco Use    Smoking status: Former     Current packs/day: 0.00     Average packs/day: 1 pack/day for 15.0 years (15.0 ttl pk-yrs)     Types: Cigarettes     Start date: 5/15/1949     Quit date: 5/15/1964     Years since quittin.7     Passive exposure: Past    Smokeless tobacco: Never   Substance and Sexual Activity    Alcohol use: Yes     Alcohol/week: 2.0 standard drinks of alcohol     Types: 2 Cans of beer per week    Drug use: No    Sexual activity: Not Currently     Partners: Female     Review of Systems   Constitutional: Negative for decreased appetite, diaphoresis, fever, malaise/fatigue and night sweats.   HENT:  Negative for nosebleeds.    Eyes:  Negative for blurred vision and double vision.   Cardiovascular:  Positive for dyspnea on exertion, orthopnea and palpitations. Negative for chest pain, claudication, irregular heartbeat, leg swelling, near-syncope, paroxysmal nocturnal dyspnea and syncope.   Respiratory:  Negative for cough, shortness of breath, sleep disturbances due to breathing, snoring, sputum production and wheezing.    Endocrine: Negative for cold intolerance and polyuria.   Hematologic/Lymphatic: Does not bruise/bleed easily.   Skin:  Negative for rash.   Musculoskeletal:  Negative for back pain, falls, joint pain, joint swelling and neck pain.   Gastrointestinal:  Negative for abdominal pain, heartburn, nausea and vomiting.   Genitourinary:  Negative for dysuria, frequency and hematuria.   Neurological:  Negative for difficulty with concentration, dizziness, focal weakness, headaches, light-headedness, numbness, seizures and weakness.   Psychiatric/Behavioral:  Negative for depression, memory loss and substance abuse. The patient does not have insomnia.    Allergic/Immunologic: Negative for HIV exposure and hives.     Objective:     Vital Signs (Most Recent):  Temp: 97.4 °F (36.3 °C) (25 1612)  Pulse: (!) 114  "(01/25/25 1612)  Resp: 20 (01/25/25 1612)  BP: (!) 151/97 (01/25/25 1612)  SpO2: 98 % (01/25/25 1612) Vital Signs (24h Range):  Temp:  [97.3 °F (36.3 °C)-98.2 °F (36.8 °C)] 97.4 °F (36.3 °C)  Pulse:  [] 114  Resp:  [20-22] 20  SpO2:  [92 %-98 %] 98 %  BP: (117-158)/() 151/97     Weight: 97.8 kg (215 lb 9.8 oz)  Body mass index is 29.24 kg/m².    SpO2: 98 %         Intake/Output Summary (Last 24 hours) at 1/25/2025 1808  Last data filed at 1/25/2025 1615  Gross per 24 hour   Intake --   Output 2025 ml   Net -2025 ml       Lines/Drains/Airways       Peripheral Intravenous Line  Duration                  Peripheral IV - Single Lumen 01/25/25 0946 20 G Right Antecubital <1 day                     Physical Exam  HENT:      Head: Normocephalic.   Eyes:      Pupils: Pupils are equal, round, and reactive to light.   Neck:      Thyroid: No thyromegaly.      Vascular: Normal carotid pulses. JVD present. No carotid bruit.   Cardiovascular:      Rate and Rhythm: Tachycardia present. Rhythm irregularly irregular. No extrasystoles are present.     Chest Wall: PMI is not displaced.      Pulses: Normal pulses.      Heart sounds: Normal heart sounds. No murmur heard.     No gallop. No S3 sounds.   Pulmonary:      Effort: No respiratory distress.      Breath sounds: Normal breath sounds. No stridor.   Abdominal:      General: Bowel sounds are normal.      Palpations: Abdomen is soft.      Tenderness: There is no abdominal tenderness. There is no rebound.   Skin:     Findings: No rash.   Neurological:      Mental Status: He is alert and oriented to person, place, and time.   Psychiatric:         Behavior: Behavior normal.          Significant Labs: ABG: No results for input(s): "PH", "PCO2", "HCO3", "POCSATURATED", "BE" in the last 48 hours., Blood Culture: No results for input(s): "LABBLOO" in the last 48 hours., BMP:   Recent Labs   Lab 01/24/25  0912 01/25/25  0434    96    137   K 3.8 3.9    105 " "  CO2 21* 22*   BUN 19 20   CREATININE 1.2 1.1   CALCIUM 9.5 8.8   MG 2.1 2.0   , CMP   Recent Labs   Lab 01/24/25  0912 01/25/25  0434    137   K 3.8 3.9    105   CO2 21* 22*    96   BUN 19 20   CREATININE 1.2 1.1   CALCIUM 9.5 8.8   PROT 7.4 6.0   ALBUMIN 3.9 3.3*   BILITOT 1.0 1.1*   ALKPHOS 67 54   AST 24 20   ALT 29 21   ANIONGAP 12 10   , CBC   Recent Labs   Lab 01/24/25  0912 01/25/25  0434   WBC 7.92 6.88   HGB 14.2 12.3*   HCT 43.9 38.4*    273   , INR   Recent Labs   Lab 01/24/25 0912   INR 1.0   , Lipid Panel No results for input(s): "CHOL", "HDL", "LDLCALC", "TRIG", "CHOLHDL" in the last 48 hours., and Troponin No results for input(s): "TROPONINIHS" in the last 48 hours.    Significant Imaging: EKG:    "

## 2025-01-26 NOTE — ASSESSMENT & PLAN NOTE
CHFrEF new dx    Continue amio gtt for afib  Continue BB  Add gentle diuresis with lasix 20 mg ivp daily

## 2025-01-26 NOTE — SUBJECTIVE & OBJECTIVE
Interval History: f/u atrial fibrillation  tachycardic and short of breath when getting out of bed. Discussed echo findings. Plan rate control. Further workup to determine why EF is low after rate controlled.    Review of Systems  Objective:     Vital Signs (Most Recent):  Temp: 97.3 °F (36.3 °C) (01/26/25 1148)  Pulse: 95 (01/26/25 1148)  Resp: 16 (01/26/25 1148)  BP: 123/87 (01/26/25 1148)  SpO2: 95 % (01/26/25 1148) Vital Signs (24h Range):  Temp:  [97.3 °F (36.3 °C)-98 °F (36.7 °C)] 97.3 °F (36.3 °C)  Pulse:  [] 95  Resp:  [14-20] 16  SpO2:  [94 %-99 %] 95 %  BP: (101-163)/(70-97) 123/87     Weight: 95.3 kg (210 lb 1.6 oz)  Body mass index is 28.49 kg/m².    Intake/Output Summary (Last 24 hours) at 1/26/2025 1415  Last data filed at 1/26/2025 0623  Gross per 24 hour   Intake 532.35 ml   Output 1795 ml   Net -1262.65 ml         Physical Exam  HENT:      Head: Normocephalic and atraumatic.   Cardiovascular:      Rate and Rhythm: Tachycardia present. Rhythm irregular.      Heart sounds: No murmur heard.  Pulmonary:      Effort: Pulmonary effort is normal. No respiratory distress.      Breath sounds: Normal breath sounds. No wheezing.   Abdominal:      General: Bowel sounds are normal. There is no distension.      Palpations: Abdomen is soft.      Tenderness: There is no abdominal tenderness.   Musculoskeletal:         General: No swelling.   Skin:     General: Skin is warm and dry.   Neurological:      Mental Status: He is alert and oriented to person, place, and time. Mental status is at baseline.             Significant Labs: All pertinent labs within the past 24 hours have been reviewed.  Recent Lab Results         01/26/25  0805   01/26/25  0439   01/26/25  0029   01/25/25  1825        Albumin   3.3           ALP   59           ALT   27           Anion Gap   14           PTT 43.4  Comment: Refer to local heparin nomogram for intensity/dose specific   therapeutic   range.       75.9  Comment: Refer to  local heparin nomogram for intensity/dose specific   therapeutic   range.  Reviewed by Technologist.     26.5  Comment: Refer to local heparin nomogram for intensity/dose specific   therapeutic   range.         AST   26           Baso #   0.11           Basophil %   1.4           BILIRUBIN TOTAL   0.7  Comment: For infants and newborns, interpretation of results should be based  on gestational age, weight and in agreement with clinical  observations.    Premature Infant recommended reference ranges:  Up to 24 hours.............<8.0 mg/dL  Up to 48 hours............<12.0 mg/dL  3-5 days..................<15.0 mg/dL  6-29 days.................<15.0 mg/dL             BUN   18           Calcium   8.9           Chloride   103           CO2   18           Creatinine   1.0           Differential Method   Automated           eGFR   >60           Eos #   0.1           Eos %   1.7           Glucose   112           Gran # (ANC)   4.7           Gran %   60.6           Hematocrit   40.8           Hemoglobin   13.3           Immature Grans (Abs)   0.01  Comment: Mild elevation in immature granulocytes is non specific and   can be seen in a variety of conditions including stress response,   acute inflammation, trauma and pregnancy. Correlation with other   laboratory and clinical findings is essential.             Immature Granulocytes   0.1           INR       1.0  Comment: Coumadin Therapy:  2.0 - 3.0 for INR for all indicators except mechanical heart valves  and antiphospholipid syndromes which should use 2.5 - 3.5.         Lymph #   2.2           Lymph %   27.6           Magnesium    1.9           MCH   28.6           MCHC   32.6           MCV   88           Mono #   0.7           Mono %   8.6           MPV   9.9           nRBC   0           Platelet Count   268           Potassium   3.4           PROTEIN TOTAL   6.4           PT       11.6       RBC   4.65           RDW   14.0           Sodium   135           WBC   7.79                    Significant Imaging: I have reviewed all pertinent imaging results/findings within the past 24 hours.

## 2025-01-26 NOTE — ASSESSMENT & PLAN NOTE
Patient has  new onset  atrial fibrillation. Patient is currently in atrial fibrillation. [id number] Score: 2. The patients heart rate in the last 24 hours is as follows:  Pulse  Min: 88  Max: 124     Antiarrhythmics  metoprolol tartrate (LOPRESSOR) tablet 25 mg, 2 times daily, Oral  amiodarone 360 mg/200 mL (1.8 mg/mL) infusion, Continuous, Intravenous    Anticoagulants  heparin 25,000 units in dextrose 5% 250 mL (100 units/mL) infusion LOW INTENSITY nomogram - [facility], Continuous, Intravenous  heparin 25,000 units in dextrose 5% (100 units/ml) IV bolus from bag LOW INTENSITY nomogram - [facility], As needed (PRN), Intravenous  heparin 25,000 units in dextrose 5% (100 units/ml) IV bolus from bag LOW INTENSITY nomogram - [facility], As needed (PRN), Intravenous    Plan  - Replete lytes with a goal of K>4, Mg >2  - Patient is anticoagulated, see medications listed above.  -cardiology consulted

## 2025-01-26 NOTE — SUBJECTIVE & OBJECTIVE
"    ROS  Objective:     Vital Signs (Most Recent):  Temp: 97.6 °F (36.4 °C) (01/26/25 0735)  Pulse: (!) 113 (01/26/25 0735)  Resp: 16 (01/26/25 0735)  BP: (!) 163/94 (01/26/25 0958)  SpO2: (!) 94 % (01/26/25 0735) Vital Signs (24h Range):  Temp:  [97.3 °F (36.3 °C)-98 °F (36.7 °C)] 97.6 °F (36.4 °C)  Pulse:  [] 113  Resp:  [14-20] 16  SpO2:  [94 %-99 %] 94 %  BP: (101-163)/(70-97) 163/94     Weight: 95.3 kg (210 lb 1.6 oz)  Body mass index is 28.49 kg/m².     SpO2: (!) 94 %         Intake/Output Summary (Last 24 hours) at 1/26/2025 1135  Last data filed at 1/26/2025 0623  Gross per 24 hour   Intake 532.35 ml   Output 1795 ml   Net -1262.65 ml       Lines/Drains/Airways       Peripheral Intravenous Line  Duration                  Peripheral IV - Single Lumen 01/25/25 1855 22 G Anterior;Distal;Left Upper Arm <1 day         Peripheral IV - Single Lumen 01/26/25 0950 20 G Anterior;Proximal;Right Forearm <1 day                       Physical Exam  HENT:      Head: Normocephalic.   Eyes:      Pupils: Pupils are equal, round, and reactive to light.   Neck:      Thyroid: No thyromegaly.      Vascular: Normal carotid pulses. JVD present. No carotid bruit.   Cardiovascular:      Rate and Rhythm: Normal rate. Rhythm irregularly irregular. No extrasystoles are present.     Chest Wall: PMI is not displaced.      Pulses: Normal pulses.      Heart sounds: Normal heart sounds. No murmur heard.     No gallop. No S3 sounds.   Pulmonary:      Effort: No respiratory distress.      Breath sounds: Normal breath sounds. No stridor.   Abdominal:      General: Bowel sounds are normal.      Palpations: Abdomen is soft.      Tenderness: There is no abdominal tenderness. There is no rebound.   Skin:     Findings: No rash.   Neurological:      Mental Status: He is alert and oriented to person, place, and time.   Psychiatric:         Behavior: Behavior normal.            Significant Labs: ABG: No results for input(s): "PH", "PCO2", "HCO3", " ""POCSATURATED", "BE" in the last 48 hours., Blood Culture: No results for input(s): "LABBLOO" in the last 48 hours., BMP:   Recent Labs   Lab 01/25/25 0434 01/26/25 0439   GLU 96 112*    135*   K 3.9 3.4*    103   CO2 22* 18*   BUN 20 18   CREATININE 1.1 1.0   CALCIUM 8.8 8.9   MG 2.0 1.9   , CMP   Recent Labs   Lab 01/25/25 0434 01/26/25 0439    135*   K 3.9 3.4*    103   CO2 22* 18*   GLU 96 112*   BUN 20 18   CREATININE 1.1 1.0   CALCIUM 8.8 8.9   PROT 6.0 6.4   ALBUMIN 3.3* 3.3*   BILITOT 1.1* 0.7   ALKPHOS 54 59   AST 20 26   ALT 21 27   ANIONGAP 10 14   , CBC   Recent Labs   Lab 01/25/25 0434 01/26/25 0439   WBC 6.88 7.79   HGB 12.3* 13.3*   HCT 38.4* 40.8    268   , INR   Recent Labs   Lab 01/25/25  1825   INR 1.0   , Lipid Panel No results for input(s): "CHOL", "HDL", "LDLCALC", "TRIG", "CHOLHDL" in the last 48 hours., and Troponin No results for input(s): "TROPONINIHS" in the last 48 hours.    Significant Imaging: EKG:    "

## 2025-01-26 NOTE — ASSESSMENT & PLAN NOTE
New Dx of Afib with RVR  CHFrEF 30%      -- add amio bolus and gtt  -- add Heparin gtt   -- continue BB

## 2025-01-26 NOTE — PLAN OF CARE
Plan of care reviewed with patient with verbal understanding. Chart and orders reviewed.  Pt remains free from falls this shift, Safety precautions in place.   Pt currently resting comfortably in high fowlers position in bed.   Purposeful rounding with bed in lowest position, side rails up, call light in reach.  Pt is on amio gtt @ 0.5 mcg/min and heparin gtt @ 10 u/kg will no therapeutic levels. Next aptt lab scheduled for 741 am.  Will continue to monitor until end of shift.       Problem: Adult Inpatient Plan of Care  Goal: Plan of Care Review  Outcome: Progressing  Flowsheets (Taken 1/26/2025 0223)  Plan of Care Reviewed With: patient  Goal: Patient-Specific Goal (Individualized)  Outcome: Progressing  Flowsheets (Taken 1/26/2025 0223)  Individualized Care Needs: Dump urinal frequently, keep head of bed high.  Anxieties, Fears or Concerns: Pt is worried about his breathing.  Patient/Family-Specific Goals (Include Timeframe): The pt will be educated on the new medications and life style changes r/t new diagnosis by end of admission.  Goal: Absence of Hospital-Acquired Illness or Injury  Outcome: Progressing     Problem: Fall Injury Risk  Goal: Absence of Fall and Fall-Related Injury  Outcome: Progressing     Problem: Dysrhythmia  Goal: Normalized Cardiac Rhythm  Outcome: Progressing     Problem: Fatigue  Goal: Improved Activity Tolerance  Outcome: Progressing

## 2025-01-26 NOTE — PLAN OF CARE
Problem: Adult Inpatient Plan of Care  Goal: Plan of Care Review  Outcome: Progressing  Goal: Patient-Specific Goal (Individualized)  Outcome: Progressing  Goal: Absence of Hospital-Acquired Illness or Injury  Outcome: Progressing  Goal: Optimal Comfort and Wellbeing  Outcome: Progressing  Goal: Readiness for Transition of Care  Outcome: Progressing     Problem: Fall Injury Risk  Goal: Absence of Fall and Fall-Related Injury  Outcome: Progressing     Problem: Dysrhythmia  Goal: Normalized Cardiac Rhythm  Outcome: Progressing     Problem: Fatigue  Goal: Improved Activity Tolerance  Outcome: Progressing

## 2025-01-26 NOTE — PROGRESS NOTES
[facility] - Telemetry ([facility])  Cardiology  Progress Note    Patient Name: Jose Latham  MRN: [id number]  Admission Date: 1/24/2025  Hospital Length of Stay: 2 days  Code Status: Full Code   Attending Physician: Sean Vázquez MD   Primary Care Physician: Ron Calle MD  Expected Discharge Date:   Principal Problem:Atrial fibrillation    Subjective:     Hospital Course:   Cardiology consult for afib with RVR and CHF rEF exacerbation  C/o sob and palpitation for a week  In ER, EKG showed new afib with RVR  Echo showed EF 30% and normal LV size  Bnp 873  Troponin 0/164 flat  Cr 1.1  HGB 12.3    01/26/2025  Feels better. Add Amio gtt yesterday. AFIB and now VR Controlled. BP controlled and Cr 1.0 stable I&O -4.7 liters since admission            ROS  Objective:     Vital Signs (Most Recent):  Temp: 97.6 °F (36.4 °C) (01/26/25 0735)  Pulse: (!) 113 (01/26/25 0735)  Resp: 16 (01/26/25 0735)  BP: (!) 163/94 (01/26/25 0958)  SpO2: (!) 94 % (01/26/25 0735) Vital Signs (24h Range):  Temp:  [97.3 °F (36.3 °C)-98 °F (36.7 °C)] 97.6 °F (36.4 °C)  Pulse:  [] 113  Resp:  [14-20] 16  SpO2:  [94 %-99 %] 94 %  BP: (101-163)/(70-97) 163/94     Weight: 95.3 kg (210 lb 1.6 oz)  Body mass index is 28.49 kg/m².     SpO2: (!) 94 %         Intake/Output Summary (Last 24 hours) at 1/26/2025 1135  Last data filed at 1/26/2025 0623  Gross per 24 hour   Intake 532.35 ml   Output 1795 ml   Net -1262.65 ml       Lines/Drains/Airways       Peripheral Intravenous Line  Duration                  Peripheral IV - Single Lumen 01/25/25 1855 22 G Anterior;Distal;Left Upper Arm <1 day         Peripheral IV - Single Lumen 01/26/25 0950 20 G Anterior;Proximal;Right Forearm <1 day                       Physical Exam  HENT:      Head: Normocephalic.   Eyes:      Pupils: Pupils are equal, round, and reactive to light.   Neck:      Thyroid: No thyromegaly.      Vascular: Normal carotid pulses. JVD present. No carotid bruit.   Cardiovascular:  "     Rate and Rhythm: Normal rate. Rhythm irregularly irregular. No extrasystoles are present.     Chest Wall: PMI is not displaced.      Pulses: Normal pulses.      Heart sounds: Normal heart sounds. No murmur heard.     No gallop. No S3 sounds.   Pulmonary:      Effort: No respiratory distress.      Breath sounds: Normal breath sounds. No stridor.   Abdominal:      General: Bowel sounds are normal.      Palpations: Abdomen is soft.      Tenderness: There is no abdominal tenderness. There is no rebound.   Skin:     Findings: No rash.   Neurological:      Mental Status: He is alert and oriented to person, place, and time.   Psychiatric:         Behavior: Behavior normal.            Significant Labs: ABG: No results for input(s): "PH", "PCO2", "HCO3", "POCSATURATED", "BE" in the last 48 hours., Blood Culture: No results for input(s): "LABBLOO" in the last 48 hours., BMP:   Recent Labs   Lab 01/25/25 0434 01/26/25 0439   GLU 96 112*    135*   K 3.9 3.4*    103   CO2 22* 18*   BUN 20 18   CREATININE 1.1 1.0   CALCIUM 8.8 8.9   MG 2.0 1.9   , CMP   Recent Labs   Lab 01/25/25 0434 01/26/25 0439    135*   K 3.9 3.4*    103   CO2 22* 18*   GLU 96 112*   BUN 20 18   CREATININE 1.1 1.0   CALCIUM 8.8 8.9   PROT 6.0 6.4   ALBUMIN 3.3* 3.3*   BILITOT 1.1* 0.7   ALKPHOS 54 59   AST 20 26   ALT 21 27   ANIONGAP 10 14   , CBC   Recent Labs   Lab 01/25/25 0434 01/26/25 0439   WBC 6.88 7.79   HGB 12.3* 13.3*   HCT 38.4* 40.8    268   , INR   Recent Labs   Lab 01/25/25  1825   INR 1.0   , Lipid Panel No results for input(s): "CHOL", "HDL", "LDLCALC", "TRIG", "CHOLHDL" in the last 48 hours., and Troponin No results for input(s): "TROPONINIHS" in the last 48 hours.    Significant Imaging: EKG:    Assessment and Plan:       * Atrial fibrillation  New Dx of Afib with RVR  CHFrEF 30%      -- add amio bolus and gtt  -- add Heparin gtt   -- continue BB    01/26/2025  AFIB VRC  Switch amio gtt to 20 mg " bid  Consider to switch to OAC before d/c  Continue BB and ARB    Systolic heart failure  CHFrEF new dx    Continue amio gtt for afib  Continue BB  Add gentle diuresis with lasix 20 mg ivp daily    01/26/2025  Switch losartan to entresto 24/26 mg bid  Add aldactone 25 mg   Continue toproXL and Lasix 20 mg ivp daily  Fluid restriction 1.5 l a day  Ischemia work up as op if indicated        Elevated troponin  Demand ischemia >> ACS    Ischemia workup once afib and CHFrEF controlled          VTE Risk Mitigation (From admission, onward)           Ordered     heparin 25,000 units in dextrose 5% (100 units/ml) IV bolus from bag LOW INTENSITY nomogram - [facility]  As needed (PRN)        Question:  Heparin Infusion Adjustment (DO NOT MODIFY ANSWER)  Answer:  \\[email or web address]\epic\Images\Pharmacy\HeparinInfusions\heparin LOW INTENSITY nomogram for [facility] [id number].pdf    01/25/25 1807     heparin 25,000 units in dextrose 5% (100 units/ml) IV bolus from bag LOW INTENSITY nomogram - [facility]  As needed (PRN)        Question:  Heparin Infusion Adjustment (DO NOT MODIFY ANSWER)  Answer:  [email or web address]\epic\Images\Pharmacy\HeparinInfusions\heparin LOW INTENSITY nomogram for [facility] [id number].pdf    01/25/25 1807     heparin 25,000 units in dextrose 5% 250 mL (100 units/mL) infusion LOW INTENSITY nomogram - [facility]  Continuous        Question:  Begin at (units/kg/hr)  Answer:  12    01/25/25 1807     IP VTE HIGH RISK PATIENT  Once         01/24/25 1204     Place sequential compression device  Until discontinued         01/24/25 1204                    Jim Puente MD  Cardiology  [facility] - Telemetry ([facility])

## 2025-01-26 NOTE — ASSESSMENT & PLAN NOTE
-new diagnosis  -IV lasix  -possible outpatient ischemic workup  -Entresto added  -continue metoprolol

## 2025-01-27 LAB
ALBUMIN SERPL BCP-MCNC: 3.5 G/DL (ref 3.5–5.2)
ALP SERPL-CCNC: 63 U/L (ref 40–150)
ALT SERPL W/O P-5'-P-CCNC: 31 U/L (ref 10–44)
ANION GAP SERPL CALC-SCNC: 14 MMOL/L (ref 8–16)
APTT PPP: 40.9 SEC (ref 21–32)
AST SERPL-CCNC: 27 U/L (ref 10–40)
BASOPHILS # BLD AUTO: 0.08 K/UL (ref 0–0.2)
BASOPHILS NFR BLD: 0.9 % (ref 0–1.9)
BILIRUB SERPL-MCNC: 0.6 MG/DL (ref 0.1–1)
BUN SERPL-MCNC: 19 MG/DL (ref 8–23)
CALCIUM SERPL-MCNC: 9.1 MG/DL (ref 8.7–10.5)
CHLORIDE SERPL-SCNC: 101 MMOL/L (ref 95–110)
CO2 SERPL-SCNC: 20 MMOL/L (ref 23–29)
CREAT SERPL-MCNC: 1.1 MG/DL (ref 0.5–1.4)
DIFFERENTIAL METHOD BLD: ABNORMAL
EOSINOPHIL # BLD AUTO: 0.1 K/UL (ref 0–0.5)
EOSINOPHIL NFR BLD: 0.9 % (ref 0–8)
ERYTHROCYTE [DISTWIDTH] IN BLOOD BY AUTOMATED COUNT: 13.9 % (ref 11.5–14.5)
EST. GFR  (NO RACE VARIABLE): >60 ML/MIN/1.73 M^2
GLUCOSE SERPL-MCNC: 117 MG/DL (ref 70–110)
HCT VFR BLD AUTO: 42.7 % (ref 40–54)
HGB BLD-MCNC: 13.7 G/DL (ref 14–18)
IMM GRANULOCYTES # BLD AUTO: 0.03 K/UL (ref 0–0.04)
IMM GRANULOCYTES NFR BLD AUTO: 0.3 % (ref 0–0.5)
LYMPHOCYTES # BLD AUTO: 1.9 K/UL (ref 1–4.8)
LYMPHOCYTES NFR BLD: 21.6 % (ref 18–48)
MAGNESIUM SERPL-MCNC: 2 MG/DL (ref 1.6–2.6)
MCH RBC QN AUTO: 28.1 PG (ref 27–31)
MCHC RBC AUTO-ENTMCNC: 32.1 G/DL (ref 32–36)
MCV RBC AUTO: 88 FL (ref 82–98)
MONOCYTES # BLD AUTO: 0.8 K/UL (ref 0.3–1)
MONOCYTES NFR BLD: 9.1 % (ref 4–15)
NEUTROPHILS # BLD AUTO: 5.9 K/UL (ref 1.8–7.7)
NEUTROPHILS NFR BLD: 67.2 % (ref 38–73)
NRBC BLD-RTO: 0 /100 WBC
PLATELET # BLD AUTO: 290 K/UL (ref 150–450)
PMV BLD AUTO: 10.2 FL (ref 9.2–12.9)
POTASSIUM SERPL-SCNC: 4.2 MMOL/L (ref 3.5–5.1)
PROT SERPL-MCNC: 6.8 G/DL (ref 6–8.4)
RBC # BLD AUTO: 4.87 M/UL (ref 4.6–6.2)
SODIUM SERPL-SCNC: 135 MMOL/L (ref 136–145)
WBC # BLD AUTO: 8.81 K/UL (ref 3.9–12.7)

## 2025-01-27 PROCEDURE — 85730 THROMBOPLASTIN TIME PARTIAL: CPT | Performed by: INTERNAL MEDICINE

## 2025-01-27 PROCEDURE — 83735 ASSAY OF MAGNESIUM: CPT | Performed by: INTERNAL MEDICINE

## 2025-01-27 PROCEDURE — 63600175 PHARM REV CODE 636 W HCPCS: Performed by: INTERNAL MEDICINE

## 2025-01-27 PROCEDURE — 99232 SBSQ HOSP IP/OBS MODERATE 35: CPT | Mod: ,,, | Performed by: INTERNAL MEDICINE

## 2025-01-27 PROCEDURE — 21400001 HC TELEMETRY ROOM

## 2025-01-27 PROCEDURE — 25000003 PHARM REV CODE 250: Performed by: INTERNAL MEDICINE

## 2025-01-27 PROCEDURE — 25000003 PHARM REV CODE 250: Performed by: HOSPITALIST

## 2025-01-27 PROCEDURE — 85025 COMPLETE CBC W/AUTO DIFF WBC: CPT | Performed by: INTERNAL MEDICINE

## 2025-01-27 PROCEDURE — 80053 COMPREHEN METABOLIC PANEL: CPT | Performed by: INTERNAL MEDICINE

## 2025-01-27 RX ORDER — METOPROLOL TARTRATE 50 MG/1
50 TABLET ORAL 2 TIMES DAILY
Status: DISCONTINUED | OUTPATIENT
Start: 2025-01-27 | End: 2025-01-27

## 2025-01-27 RX ORDER — AMOXICILLIN 250 MG
1 CAPSULE ORAL DAILY PRN
Status: DISCONTINUED | OUTPATIENT
Start: 2025-01-27 | End: 2025-01-28 | Stop reason: HOSPADM

## 2025-01-27 RX ORDER — METOPROLOL TARTRATE 50 MG/1
50 TABLET ORAL 3 TIMES DAILY
Status: DISCONTINUED | OUTPATIENT
Start: 2025-01-27 | End: 2025-01-28 | Stop reason: HOSPADM

## 2025-01-27 RX ORDER — FUROSEMIDE 10 MG/ML
20 INJECTION INTRAMUSCULAR; INTRAVENOUS EVERY 12 HOURS
Status: DISCONTINUED | OUTPATIENT
Start: 2025-01-27 | End: 2025-01-28 | Stop reason: HOSPADM

## 2025-01-27 RX ADMIN — FUROSEMIDE 20 MG: 10 INJECTION, SOLUTION INTRAMUSCULAR; INTRAVENOUS at 08:01

## 2025-01-27 RX ADMIN — METOPROLOL TARTRATE 50 MG: 50 TABLET, FILM COATED ORAL at 08:01

## 2025-01-27 RX ADMIN — FUROSEMIDE 20 MG: 10 INJECTION, SOLUTION INTRAMUSCULAR; INTRAVENOUS at 09:01

## 2025-01-27 RX ADMIN — SACUBITRIL AND VALSARTAN 1 TABLET: 24; 26 TABLET, FILM COATED ORAL at 08:01

## 2025-01-27 RX ADMIN — SACUBITRIL AND VALSARTAN 1 TABLET: 24; 26 TABLET, FILM COATED ORAL at 09:01

## 2025-01-27 RX ADMIN — AMIODARONE HYDROCHLORIDE 0.5 MG/MIN: 1.8 INJECTION, SOLUTION INTRAVENOUS at 01:01

## 2025-01-27 RX ADMIN — ASPIRIN 81 MG CHEWABLE TABLET 81 MG: 81 TABLET CHEWABLE at 08:01

## 2025-01-27 RX ADMIN — METOPROLOL TARTRATE 50 MG: 50 TABLET, FILM COATED ORAL at 09:01

## 2025-01-27 RX ADMIN — Medication 6 MG: at 09:01

## 2025-01-27 RX ADMIN — SPIRONOLACTONE 25 MG: 25 TABLET, FILM COATED ORAL at 08:01

## 2025-01-27 RX ADMIN — SENNOSIDES AND DOCUSATE SODIUM 1 TABLET: 50; 8.6 TABLET ORAL at 09:01

## 2025-01-27 RX ADMIN — METOPROLOL TARTRATE 50 MG: 50 TABLET, FILM COATED ORAL at 02:01

## 2025-01-27 RX ADMIN — APIXABAN 5 MG: 2.5 TABLET, FILM COATED ORAL at 08:01

## 2025-01-27 RX ADMIN — APIXABAN 5 MG: 2.5 TABLET, FILM COATED ORAL at 09:01

## 2025-01-27 NOTE — ASSESSMENT & PLAN NOTE
Patient's blood pressure range in the last 24 hours was: BP  Min: 113/83  Max: 160/95.The patient's inpatient anti-hypertensive regimen is listed below:  Current Antihypertensives  spironolactone tablet 25 mg, Daily, Oral  metoprolol tartrate (LOPRESSOR) tablet 50 mg, 3 times daily, Oral  furosemide injection 20 mg, Every 12 hours, Intravenous    Plan  - BP is controlled, no changes needed to their regimen  - losartan changed to Entresto  Lasix intravenous increased to bid dosing

## 2025-01-27 NOTE — PROGRESS NOTES
[facility] Medicine  Progress Note    Patient Name: Jose Latham  MRN: [id number]  Patient Class: IP- Inpatient   Admission Date: 1/24/2025  Length of Stay: 3 days  Attending Physician: Te Aldana MD  Primary Care Provider: Ron Calle MD        Subjective     Principal Problem:Atrial fibrillation        HPI:  The patient is a 89 yo male with past medical history of arthritis, hypertension and melanoma who presented to the ED with shortness of breath. He reports symptoms started a week before presentation. He reports he is pretty active. He plays golf and dances on a regular basis. He had some cold symptoms but that has resolved. He denies chest pain. Workup in the ED revealed atrial fibrillation with RVR. Cardiology and hospital medicine consulted.     Overview/Hospital Course:  The patient presented with shortness of breath. He was found to be atrial fibrillation with RVR. He was placed on metoprolol. Cardiology was consulted. Echo obtained and revealed EF of 30%. He was placed on IV lasix daily. Cardiology recommended amiodarone and heparin gtts as rate not controlled.   1/27 remains on amio gtt. Cardiology further adjusting intravenous lasix. Does not wear supplemental oxygen at baseline. Endorses improvement in dyspnea symptoms.     Interval History: See hospital course for today      Review of Systems   Constitutional:  Positive for activity change and fatigue. Negative for appetite change.   Respiratory:  Positive for shortness of breath.    Cardiovascular:  Positive for palpitations and leg swelling.   Gastrointestinal:  Negative for nausea and vomiting.   Genitourinary:  Positive for frequency.   Neurological:  Positive for weakness.   Psychiatric/Behavioral:  Negative for agitation, behavioral problems, confusion, decreased concentration and dysphoric mood. The patient is not nervous/anxious.      Objective:     Vital Signs (Most Recent):  Temp: 97.9 °F (36.6 °C)  (01/27/25 1156)  Pulse: 86 (01/27/25 1156)  Resp: 16 (01/27/25 1156)  BP: 114/73 (01/27/25 1156)  SpO2: 95 % (01/27/25 1156) Vital Signs (24h Range):  Temp:  [97.5 °F (36.4 °C)-98.1 °F (36.7 °C)] 97.9 °F (36.6 °C)  Pulse:  [] 86  Resp:  [16-24] 16  SpO2:  [94 %-97 %] 95 %  BP: (113-160)/() 114/73     Weight: 95.3 kg (210 lb 1.6 oz)  Body mass index is 28.49 kg/m².    Intake/Output Summary (Last 24 hours) at 1/27/2025 1300  Last data filed at 1/27/2025 0550  Gross per 24 hour   Intake 616.5 ml   Output 400 ml   Net 216.5 ml         Physical Exam  Vitals and nursing note reviewed.   Constitutional:       General: He is not in acute distress.     Appearance: He is ill-appearing. He is not toxic-appearing.      Interventions: Nasal cannula in place.   HENT:      Head: Normocephalic and atraumatic.   Cardiovascular:      Rate and Rhythm: Normal rate. Rhythm irregular.   Pulmonary:      Effort: Pulmonary effort is normal. No tachypnea or respiratory distress.   Chest:      Chest wall: No tenderness.   Abdominal:      Palpations: Abdomen is soft.      Tenderness: There is no abdominal tenderness.   Musculoskeletal:      Right lower leg: Edema present.      Left lower leg: Edema present.   Skin:     General: Skin is warm.   Neurological:      Mental Status: He is alert and oriented to person, place, and time.      Motor: Weakness present.             Significant Labs: All pertinent labs within the past 24 hours have been reviewed.  CBC:   Recent Labs   Lab 01/26/25  0439 01/27/25  0535   WBC 7.79 8.81   HGB 13.3* 13.7*   HCT 40.8 42.7    290     CMP:   Recent Labs   Lab 01/26/25  0439 01/27/25  0535   * 135*   K 3.4* 4.2    101   CO2 18* 20*   * 117*   BUN 18 19   CREATININE 1.0 1.1   CALCIUM 8.9 9.1   PROT 6.4 6.8   ALBUMIN 3.3* 3.5   BILITOT 0.7 0.6   ALKPHOS 59 63   AST 26 27   ALT 27 31   ANIONGAP 14 14       Coagulation:   Recent Labs   Lab 01/25/25  1825 01/26/25  0029  01/27/25  0535   INR 1.0  --   --    APTT 26.5   < > 40.9*    < > = values in this interval not displayed.       Significant Imaging: I have reviewed all pertinent imaging results/findings within the past 24 hours.    Assessment and Plan     * Atrial fibrillation  Patient has  new onset  atrial fibrillation. Patient is currently in atrial fibrillation. [id number] Score: 2. The patients heart rate in the last 24 hours is as follows:  Pulse  Min: 85  Max: 106     Antiarrhythmics  amiodarone 360 mg/200 mL (1.8 mg/mL) infusion, Continuous, Intravenous  metoprolol tartrate (LOPRESSOR) tablet 50 mg, 3 times daily, Oral    Anticoagulants  apixaban tablet 5 mg, 2 times daily, Oral    Plan  - Replete lytes with a goal of K>4, Mg >2  - Patient is anticoagulated, see medications listed above.  -cardiology consulted  Remains on amio gtt  Heart rate improving      Systolic heart failure  -new diagnosis  -IV lasix  -possible outpatient ischemic workup  -Entresto added  -continue metoprolol  Remains on amio infusion per cardiology       Elevated troponin  -denies chest pain  -may be demand from afib with RVR      Essential hypertension  Patient's blood pressure range in the last 24 hours was: BP  Min: 113/83  Max: 160/95.The patient's inpatient anti-hypertensive regimen is listed below:  Current Antihypertensives  spironolactone tablet 25 mg, Daily, Oral  metoprolol tartrate (LOPRESSOR) tablet 50 mg, 3 times daily, Oral  furosemide injection 20 mg, Every 12 hours, Intravenous    Plan  - BP is controlled, no changes needed to their regimen  - losartan changed to Entresto  Lasix intravenous increased to bid dosing      VTE Risk Mitigation (From admission, onward)           Ordered     apixaban tablet 5 mg  2 times daily         01/27/25 0823     IP VTE HIGH RISK PATIENT  Once         01/24/25 1204     Place sequential compression device  Until discontinued         01/24/25 1204                    Discharge Planning   TIM:      Code  Status: Full Code   Medical Readiness for Discharge Date:   Discharge Plan A: Home                        Te Aldana MD  Department of Hospital Medicine   [facility])

## 2025-01-27 NOTE — PLAN OF CARE
Plan of care reviewed with patient with verbal understanding. Chart and orders reviewed.  Pt remains free from falls this shift, Safety precautions in place.   Pt currently resting comfortably in bed.   Pt continues to be short of breath with tachypnea and heart rates increasing >120 afib rvr on exertion.  Frequent purposeful rounding with bed in lowest position, side rails up, call light in reach.  Will continue to monitor until end of shift.       Problem: Adult Inpatient Plan of Care  Goal: Plan of Care Review  Outcome: Progressing  Flowsheets (Taken 1/27/2025 0234)  Plan of Care Reviewed With: patient  Goal: Patient-Specific Goal (Individualized)  Outcome: Progressing  Flowsheets (Taken 1/27/2025 0234)  Individualized Care Needs: Dump urinal frequently, keep head of bed high, round frequently.  Anxieties, Fears or Concerns: Pt is worried about his breathing  Patient/Family-Specific Goals (Include Timeframe): The pt will be educated on the new medications and lifestyle changes by end of admission.     Problem: Fall Injury Risk  Goal: Absence of Fall and Fall-Related Injury  Outcome: Progressing     Problem: Dysrhythmia  Goal: Normalized Cardiac Rhythm  Outcome: Progressing     Problem: Fatigue  Goal: Improved Activity Tolerance  Outcome: Progressing

## 2025-01-27 NOTE — SUBJECTIVE & OBJECTIVE
Interval History: See hospital course for today      Review of Systems   Constitutional:  Positive for activity change and fatigue. Negative for appetite change.   Respiratory:  Positive for shortness of breath.    Cardiovascular:  Positive for palpitations and leg swelling.   Gastrointestinal:  Negative for nausea and vomiting.   Genitourinary:  Positive for frequency.   Neurological:  Positive for weakness.   Psychiatric/Behavioral:  Negative for agitation, behavioral problems, confusion, decreased concentration and dysphoric mood. The patient is not nervous/anxious.      Objective:     Vital Signs (Most Recent):  Temp: 97.9 °F (36.6 °C) (01/27/25 1156)  Pulse: 86 (01/27/25 1156)  Resp: 16 (01/27/25 1156)  BP: 114/73 (01/27/25 1156)  SpO2: 95 % (01/27/25 1156) Vital Signs (24h Range):  Temp:  [97.5 °F (36.4 °C)-98.1 °F (36.7 °C)] 97.9 °F (36.6 °C)  Pulse:  [] 86  Resp:  [16-24] 16  SpO2:  [94 %-97 %] 95 %  BP: (113-160)/() 114/73     Weight: 95.3 kg (210 lb 1.6 oz)  Body mass index is 28.49 kg/m².    Intake/Output Summary (Last 24 hours) at 1/27/2025 1300  Last data filed at 1/27/2025 0550  Gross per 24 hour   Intake 616.5 ml   Output 400 ml   Net 216.5 ml         Physical Exam  Vitals and nursing note reviewed.   Constitutional:       General: He is not in acute distress.     Appearance: He is ill-appearing. He is not toxic-appearing.      Interventions: Nasal cannula in place.   HENT:      Head: Normocephalic and atraumatic.   Cardiovascular:      Rate and Rhythm: Normal rate. Rhythm irregular.   Pulmonary:      Effort: Pulmonary effort is normal. No tachypnea or respiratory distress.   Chest:      Chest wall: No tenderness.   Abdominal:      Palpations: Abdomen is soft.      Tenderness: There is no abdominal tenderness.   Musculoskeletal:      Right lower leg: Edema present.      Left lower leg: Edema present.   Skin:     General: Skin is warm.   Neurological:      Mental Status: He is alert and  oriented to person, place, and time.      Motor: Weakness present.             Significant Labs: All pertinent labs within the past 24 hours have been reviewed.  CBC:   Recent Labs   Lab 01/26/25  0439 01/27/25  0535   WBC 7.79 8.81   HGB 13.3* 13.7*   HCT 40.8 42.7    290     CMP:   Recent Labs   Lab 01/26/25  0439 01/27/25  0535   * 135*   K 3.4* 4.2    101   CO2 18* 20*   * 117*   BUN 18 19   CREATININE 1.0 1.1   CALCIUM 8.9 9.1   PROT 6.4 6.8   ALBUMIN 3.3* 3.5   BILITOT 0.7 0.6   ALKPHOS 59 63   AST 26 27   ALT 27 31   ANIONGAP 14 14       Coagulation:   Recent Labs   Lab 01/25/25  1825 01/26/25  0029 01/27/25  0535   INR 1.0  --   --    APTT 26.5   < > 40.9*    < > = values in this interval not displayed.       Significant Imaging: I have reviewed all pertinent imaging results/findings within the past 24 hours.

## 2025-01-27 NOTE — PROGRESS NOTES
formerly [facility] - Telemetry ([facility])  Cardiology  Progress Note    Patient Name: Jose Latham  MRN: [id number]  Admission Date: 1/24/2025  Hospital Length of Stay: 3 days  Code Status: Full Code   Attending Physician: Te Aldana MD   Primary Care Physician: Ron Calle MD  Expected Discharge Date:   Principal Problem:Atrial fibrillation    Subjective:     Hospital Course:   Cardiology consult for afib with RVR and CHF rEF exacerbation  C/o sob and palpitation for a week  In ER, EKG showed new afib with RVR  Echo showed EF 30% and normal LV size  Bnp 873  Troponin 0/164 flat  Cr 1.1  HGB 12.3    01/26/2025  Feels better. Add Amio gtt yesterday. AFIB and now VR Controlled. BP controlled and Cr 1.0 stable I&O -4.7 liters since admission    1-27-25 HR low 100s, when up and active 130s and SOB. BP stable. Will increase b blockers and IV lasix.  Will also change heparin to eliquis    Interval History:     Review of Systems   Constitutional: Negative. Negative for weight gain.   HENT: Negative.     Eyes: Negative.    Cardiovascular: Negative.  Negative for chest pain, leg swelling and palpitations.   Respiratory: Negative.  Negative for shortness of breath.    Endocrine: Negative.    Hematologic/Lymphatic: Negative.    Skin: Negative.    Musculoskeletal:  Negative for muscle weakness.   Gastrointestinal: Negative.    Genitourinary: Negative.    Neurological: Negative.  Negative for dizziness.   Psychiatric/Behavioral: Negative.     Allergic/Immunologic: Negative.    All other systems reviewed and are negative.    Objective:     Vital Signs (Most Recent):  Temp: 97.8 °F (36.6 °C) (01/27/25 0539)  Pulse: 101 (01/27/25 0539)  Resp: 18 (01/27/25 0539)  BP: (!) 160/95 (01/27/25 0539)  SpO2: 96 % (01/27/25 0539) Vital Signs (24h Range):  Temp:  [97.3 °F (36.3 °C)-98.1 °F (36.7 °C)] 97.8 °F (36.6 °C)  Pulse:  [] 101  Resp:  [16-24] 18  SpO2:  [94 %-97 %] 96 %  BP: (113-163)/(83-99) 160/95     Weight: 95.3 kg (210 lb 1.6  oz)  Body mass index is 28.49 kg/m².     SpO2: 96 %         Intake/Output Summary (Last 24 hours) at 1/27/2025 0821  Last data filed at 1/27/2025 0550  Gross per 24 hour   Intake 616.5 ml   Output 400 ml   Net 216.5 ml       Lines/Drains/Airways       Peripheral Intravenous Line  Duration                  Peripheral IV - Single Lumen 01/25/25 1855 22 G Anterior;Distal;Left Upper Arm 1 day         Peripheral IV - Single Lumen 01/26/25 0950 20 G Anterior;Proximal;Right Forearm <1 day                       Physical Exam  Vitals and nursing note reviewed.   Constitutional:       Appearance: He is well-developed.   HENT:      Head: Normocephalic and atraumatic.   Eyes:      Conjunctiva/sclera: Conjunctivae normal.      Pupils: Pupils are equal, round, and reactive to light.   Cardiovascular:      Rate and Rhythm: Tachycardia present. Rhythm irregular.      Pulses: Intact distal pulses.      Heart sounds: Normal heart sounds.   Pulmonary:      Effort: Pulmonary effort is normal.      Breath sounds: Normal breath sounds.   Abdominal:      General: Bowel sounds are normal.      Palpations: Abdomen is soft.   Musculoskeletal:      Cervical back: Normal range of motion and neck supple.   Skin:     General: Skin is warm and dry.   Neurological:      Mental Status: He is alert and oriented to person, place, and time.            Significant Labs: All pertinent lab results from the last 24 hours have been reviewed.    Significant Imaging: X-Ray: CXR: X-Ray Chest 1 View (CXR): No results found for this visit on 01/24/25.  Assessment and Plan:     Brief HPI:     * Atrial fibrillation  New Dx of Afib with RVR  CHFrEF 30%      -- add amio bolus and gtt  -- add Heparin gtt   -- continue BB    01/26/2025  AFIB VRC  Switch amio gtt to 20 mg bid  Consider to switch to OAC before d/c  Continue BB and ARB    Systolic heart failure  CHFrEF new dx    Continue amio gtt for afib  Continue BB  Add gentle diuresis with lasix 20 mg ivp  daily    01/26/2025  Switch losartan to entresto 24/26 mg bid  Add aldactone 25 mg   Continue toproXL and Lasix 20 mg ivp daily  Fluid restriction 1.5 l a day  Ischemia work up as op if indicated        Elevated troponin  Demand ischemia >> ACS    Ischemia workup once afib and CHFrEF controlled          VTE Risk Mitigation (From admission, onward)           Ordered     heparin 25,000 units in dextrose 5% (100 units/ml) IV bolus from bag LOW INTENSITY nomogram - [facility]  As needed (PRN)        Question:  Heparin Infusion Adjustment (DO NOT MODIFY ANSWER)  Answer:  [email or web address]\epic\Images\Pharmacy\HeparinInfusions\heparin LOW INTENSITY nomogram for [facility] [id number].pdf    01/25/25 1807     heparin 25,000 units in dextrose 5% (100 units/ml) IV bolus from bag LOW INTENSITY nomogram - [facility]  As needed (PRN)        Question:  Heparin Infusion Adjustment (DO NOT MODIFY ANSWER)  Answer:  [email or web address]\epic\Images\Pharmacy\HeparinInfusions\heparin LOW INTENSITY nomogram for [facility] [id number].pdf    01/25/25 1807     heparin 25,000 units in dextrose 5% 250 mL (100 units/mL) infusion LOW INTENSITY nomogram - [facility]  Continuous        Question:  Begin at (units/kg/hr)  Answer:  12    01/25/25 1807     IP VTE HIGH RISK PATIENT  Once         01/24/25 1204     Place sequential compression device  Until discontinued         01/24/25 1204                    Bill Fonseca MD  Cardiology  [facility] ([facility])

## 2025-01-27 NOTE — ASSESSMENT & PLAN NOTE
Patient has  new onset  atrial fibrillation. Patient is currently in atrial fibrillation. [id number] Score: 2. The patients heart rate in the last 24 hours is as follows:  Pulse  Min: 85  Max: 106     Antiarrhythmics  amiodarone 360 mg/200 mL (1.8 mg/mL) infusion, Continuous, Intravenous  metoprolol tartrate (LOPRESSOR) tablet 50 mg, 3 times daily, Oral    Anticoagulants  apixaban tablet 5 mg, 2 times daily, Oral    Plan  - Replete lytes with a goal of K>4, Mg >2  - Patient is anticoagulated, see medications listed above.  -cardiology consulted  Remains on amio gtt  Heart rate improving

## 2025-01-27 NOTE — SUBJECTIVE & OBJECTIVE
Interval History:     Review of Systems   Constitutional: Negative. Negative for weight gain.   HENT: Negative.     Eyes: Negative.    Cardiovascular: Negative.  Negative for chest pain, leg swelling and palpitations.   Respiratory: Negative.  Negative for shortness of breath.    Endocrine: Negative.    Hematologic/Lymphatic: Negative.    Skin: Negative.    Musculoskeletal:  Negative for muscle weakness.   Gastrointestinal: Negative.    Genitourinary: Negative.    Neurological: Negative.  Negative for dizziness.   Psychiatric/Behavioral: Negative.     Allergic/Immunologic: Negative.    All other systems reviewed and are negative.    Objective:     Vital Signs (Most Recent):  Temp: 97.8 °F (36.6 °C) (01/27/25 0539)  Pulse: 101 (01/27/25 0539)  Resp: 18 (01/27/25 0539)  BP: (!) 160/95 (01/27/25 0539)  SpO2: 96 % (01/27/25 0539) Vital Signs (24h Range):  Temp:  [97.3 °F (36.3 °C)-98.1 °F (36.7 °C)] 97.8 °F (36.6 °C)  Pulse:  [] 101  Resp:  [16-24] 18  SpO2:  [94 %-97 %] 96 %  BP: (113-163)/(83-99) 160/95     Weight: 95.3 kg (210 lb 1.6 oz)  Body mass index is 28.49 kg/m².     SpO2: 96 %         Intake/Output Summary (Last 24 hours) at 1/27/2025 0821  Last data filed at 1/27/2025 0550  Gross per 24 hour   Intake 616.5 ml   Output 400 ml   Net 216.5 ml       Lines/Drains/Airways       Peripheral Intravenous Line  Duration                  Peripheral IV - Single Lumen 01/25/25 1855 22 G Anterior;Distal;Left Upper Arm 1 day         Peripheral IV - Single Lumen 01/26/25 0950 20 G Anterior;Proximal;Right Forearm <1 day                       Physical Exam  Vitals and nursing note reviewed.   Constitutional:       Appearance: He is well-developed.   HENT:      Head: Normocephalic and atraumatic.   Eyes:      Conjunctiva/sclera: Conjunctivae normal.      Pupils: Pupils are equal, round, and reactive to light.   Cardiovascular:      Rate and Rhythm: Tachycardia present. Rhythm irregular.      Pulses: Intact distal pulses.       Heart sounds: Normal heart sounds.   Pulmonary:      Effort: Pulmonary effort is normal.      Breath sounds: Normal breath sounds.   Abdominal:      General: Bowel sounds are normal.      Palpations: Abdomen is soft.   Musculoskeletal:      Cervical back: Normal range of motion and neck supple.   Skin:     General: Skin is warm and dry.   Neurological:      Mental Status: He is alert and oriented to person, place, and time.            Significant Labs: All pertinent lab results from the last 24 hours have been reviewed.    Significant Imaging: X-Ray: CXR: X-Ray Chest 1 View (CXR): No results found for this visit on 01/24/25.

## 2025-01-28 ENCOUNTER — DOCUMENTATION ONLY (OUTPATIENT)
Dept: CARDIOLOGY | Facility: CLINIC | Age: OVER 89
End: 2025-01-28
Payer: MEDICARE

## 2025-01-28 VITALS
DIASTOLIC BLOOD PRESSURE: 78 MMHG | BODY MASS INDEX: 27.02 KG/M2 | WEIGHT: 199.5 LBS | HEIGHT: 72 IN | HEART RATE: 94 BPM | TEMPERATURE: 98 F | OXYGEN SATURATION: 95 % | RESPIRATION RATE: 20 BRPM | SYSTOLIC BLOOD PRESSURE: 129 MMHG

## 2025-01-28 PROCEDURE — 25000003 PHARM REV CODE 250: Performed by: INTERNAL MEDICINE

## 2025-01-28 PROCEDURE — 99232 SBSQ HOSP IP/OBS MODERATE 35: CPT | Mod: ,,, | Performed by: INTERNAL MEDICINE

## 2025-01-28 PROCEDURE — 94761 N-INVAS EAR/PLS OXIMETRY MLT: CPT

## 2025-01-28 PROCEDURE — 63600175 PHARM REV CODE 636 W HCPCS: Performed by: INTERNAL MEDICINE

## 2025-01-28 RX ORDER — SACUBITRIL AND VALSARTAN 24; 26 MG/1; MG/1
1 TABLET, FILM COATED ORAL 2 TIMES DAILY
Qty: 60 TABLET | Refills: 0 | Status: SHIPPED | OUTPATIENT
Start: 2025-01-28 | End: 2025-02-27

## 2025-01-28 RX ORDER — AMIODARONE HYDROCHLORIDE 200 MG/1
200 TABLET ORAL DAILY
Qty: 30 TABLET | Refills: 0 | Status: SHIPPED | OUTPATIENT
Start: 2025-01-29 | End: 2025-02-28

## 2025-01-28 RX ORDER — FUROSEMIDE 20 MG/1
20 TABLET ORAL 2 TIMES DAILY
Qty: 60 TABLET | Refills: 0 | Status: SHIPPED | OUTPATIENT
Start: 2025-01-28 | End: 2025-02-27

## 2025-01-28 RX ORDER — SPIRONOLACTONE 25 MG/1
25 TABLET ORAL DAILY
Qty: 30 TABLET | Refills: 0 | Status: SHIPPED | OUTPATIENT
Start: 2025-01-29 | End: 2025-02-28

## 2025-01-28 RX ORDER — AMIODARONE HYDROCHLORIDE 200 MG/1
200 TABLET ORAL DAILY
Status: DISCONTINUED | OUTPATIENT
Start: 2025-01-28 | End: 2025-01-28 | Stop reason: HOSPADM

## 2025-01-28 RX ORDER — METOPROLOL TARTRATE 50 MG/1
50 TABLET ORAL 3 TIMES DAILY
Qty: 90 TABLET | Refills: 0 | Status: SHIPPED | OUTPATIENT
Start: 2025-01-28 | End: 2025-02-27

## 2025-01-28 RX ADMIN — FUROSEMIDE 20 MG: 10 INJECTION, SOLUTION INTRAMUSCULAR; INTRAVENOUS at 08:01

## 2025-01-28 RX ADMIN — SPIRONOLACTONE 25 MG: 25 TABLET, FILM COATED ORAL at 08:01

## 2025-01-28 RX ADMIN — AMIODARONE HYDROCHLORIDE 0.5 MG/MIN: 1.8 INJECTION, SOLUTION INTRAVENOUS at 12:01

## 2025-01-28 RX ADMIN — AMIODARONE HYDROCHLORIDE 200 MG: 200 TABLET ORAL at 08:01

## 2025-01-28 RX ADMIN — SACUBITRIL AND VALSARTAN 1 TABLET: 24; 26 TABLET, FILM COATED ORAL at 08:01

## 2025-01-28 RX ADMIN — ASPIRIN 81 MG CHEWABLE TABLET 81 MG: 81 TABLET CHEWABLE at 08:01

## 2025-01-28 RX ADMIN — APIXABAN 5 MG: 2.5 TABLET, FILM COATED ORAL at 08:01

## 2025-01-28 RX ADMIN — METOPROLOL TARTRATE 50 MG: 50 TABLET, FILM COATED ORAL at 08:01

## 2025-01-28 NOTE — PLAN OF CARE
Plan of care reviewed with patient with verbal understanding. Chart and orders reviewed.  Pt remains free from falls this shift, Safety precautions in place.   Pt currently resting comfortably in bed.   Purposeful rounding with bed in lowest position, side rails up, call light in reach.  Will continue to monitor until end of shift.       Problem: Adult Inpatient Plan of Care  Goal: Plan of Care Review  Outcome: Progressing  Goal: Patient-Specific Goal (Individualized)  Outcome: Progressing  Goal: Absence of Hospital-Acquired Illness or Injury  Outcome: Progressing     Problem: Dysrhythmia  Goal: Normalized Cardiac Rhythm  Outcome: Progressing     Problem: Fatigue  Goal: Improved Activity Tolerance  Outcome: Progressing

## 2025-01-28 NOTE — PROGRESS NOTES
AVS virtually reviewed with patient in its entirety with emphasis on diet, medications, follow-up appointments and reasons to return to the ED or contact the [facility] On Call Nurse Care Line. Patient also encouraged to utilize their patient portal. Ease and convenience of use reiterated. Education complete and patient voiced understanding. All questions answered. Discharge teaching complete. Encouraged to complete patient survey.  Active with patient portal. Encouraged to monitor weight, sodium intake and fluid intake.

## 2025-01-28 NOTE — DISCHARGE INSTRUCTIONS
You have been started on new medication(s) from this hospitalization. Please take as directed and schedule hospital follow up appointments with your primary providers.    A nurse practitioner may be contacting you to assess your status post-hospitalization.     Our goal at [facility] is to always give you outstanding care and exceptional service. You may receive a survey by mail, text or e-mail in the next 7-10 days from Geovanny Baltazar and our leadership team asking about the care you received with us. The survey should only take 5-10 minutes to complete and is very important to us.     Your feedback provides us with a way to recognize our staff who work tirelessly to provide the best care! Also, your responses help us learn how to improve when your experience was below our aspiration of excellence. We WILL use your feedback to continue making improvements to help us provide the highest quality care. We keep your personal information and feedback confidential. We appreciate your time completing this survey and can't wait to hear from you!!!     We want you to leave us today feeling like you can DEFINITELY recommend us to others! We look forward to your continued care with us! Thanks so much for choosing [facility] for your healthcare needs!

## 2025-01-28 NOTE — PLAN OF CARE
[facility] - Tele[facility] (Hospital)  Discharge Final Note    Primary Care Provider: Ron Calle MD    Expected Discharge Date: 1/28/2025    Final Discharge Note (most recent)       Final Note - 01/28/25 1118          Final Note    Assessment Type Final Discharge Note (P)      Anticipated Discharge Disposition Home or Self Care (P)      Hospital Resources/Appts/Education Provided Appointments scheduled and added to AVS (P)         Post-Acute Status    Discharge Delays None known at this time (P)                      Important Message from Medicare  Important Message from Medicare regarding Discharge Appeal Rights: Given to patient/caregiver, Explained to patient/caregiver, Signed/date by patient/caregiver     Date IMM was signed: 01/28/25  Time IMM was signed: 1114    Contact Info       Ron Calle MD   Specialty: Internal Medicine   Relationship: PCP - General    [address] [place] [id number]   Phone: [phone]       Next Steps: Schedule an appointment as soon as possible for a visit in 3 day(s)    Instructions: hospital follow up    Bill Fonseca MD   Specialty: Interventional Cardiology, Cardiology    [address]  [place] [address]   Phone: [phone]       Next Steps: Schedule an appointment as soon as possible for a visit in 1 week(s)    Instructions: hospital follow up    Abby Roberts PA   Specialty: Transplant    [address] [facility] [address]   Phone: [phone]       Next Steps: Schedule an appointment as soon as possible for a visit in 3 day(s)    Instructions: hospital follow up

## 2025-01-28 NOTE — PROGRESS NOTES
[facility] ([facility])  Cardiology  Progress Note    Patient Name: Jose Latham  MRN: [id number]  Admission Date: 1/24/2025  Hospital Length of Stay: 4 days  Code Status: Full Code   Attending Physician: Te Aldana MD   Primary Care Physician: Ron Calle MD  Expected Discharge Date:   Principal Problem:Atrial fibrillation    Subjective:     Hospital Course:   Cardiology consult for afib with RVR and CHF rEF exacerbation  C/o sob and palpitation for a week  In ER, EKG showed new afib with RVR  Echo showed EF 30% and normal LV size  Bnp 873  Troponin 0/164 flat  Cr 1.1  HGB 12.3    01/26/2025  Feels better. Add Amio gtt yesterday. AFIB and now VR Controlled. BP controlled and Cr 1.0 stable I&O -4.7 liters since admission    1-27-25 HR low 100s, when up and active 130s and SOB. BP stable. Will increase b blockers and IV lasix.  Will also change heparin to eliquis    1-28-25  HR improved with increased b blockers. Currently afib 80-90 . Up and ambulate, amiodarone changed to po. If no symptoms ambulating, can go home today.    Interval History:     Review of Systems   Constitutional: Negative. Negative for weight gain.   HENT: Negative.     Eyes: Negative.    Cardiovascular: Negative.  Negative for chest pain, leg swelling and palpitations.   Respiratory: Negative.  Negative for shortness of breath.    Endocrine: Negative.    Hematologic/Lymphatic: Negative.    Skin: Negative.    Musculoskeletal:  Negative for muscle weakness.   Gastrointestinal: Negative.    Genitourinary: Negative.    Neurological: Negative.  Negative for dizziness.   Psychiatric/Behavioral: Negative.     Allergic/Immunologic: Negative.    All other systems reviewed and are negative.    Objective:     Vital Signs (Most Recent):  Temp: 97.5 °F (36.4 °C) (01/28/25 0723)  Pulse: 90 (01/28/25 0723)  Resp: 19 (01/28/25 0723)  BP: (!) 120/90 (01/28/25 0723)  SpO2: (!) 93 % (01/28/25 0723) Vital Signs (24h Range):  Temp:  [97.5 °F (36.4  °C)-98.6 °F (37 °C)] 97.5 °F (36.4 °C)  Pulse:  [] 90  Resp:  [16-19] 19  SpO2:  [91 %-97 %] 93 %  BP: (106-156)/() 120/90     Weight: 90.5 kg (199 lb 8.3 oz)  Body mass index is 27.06 kg/m².     SpO2: (!) 93 %         Intake/Output Summary (Last 24 hours) at 1/28/2025 0820  Last data filed at 1/28/2025 0340  Gross per 24 hour   Intake 363.66 ml   Output 1100 ml   Net -736.34 ml       Lines/Drains/Airways       Peripheral Intravenous Line  Duration                  Peripheral IV - Single Lumen 01/25/25 1855 22 G Anterior;Distal;Left Upper Arm 2 days         Peripheral IV - Single Lumen 01/26/25 0950 20 G Anterior;Proximal;Right Forearm 1 day                       Physical Exam  Cardiovascular:      Rate and Rhythm: Rhythm irregular.            Significant Labs: All pertinent lab results from the last 24 hours have been reviewed.    Significant Imaging: X-Ray: CXR: X-Ray Chest 1 View (CXR): No results found for this visit on 01/24/25.  Assessment and Plan:     Brief HPI:     * Atrial fibrillation  New Dx of Afib with RVR  CHFrEF 30%      -- add amio bolus and gtt  -- add Heparin gtt   -- continue BB    01/26/2025  AFIB VRC  Switch amio gtt to 20 mg bid  Consider to switch to OAC before d/c  Continue BB and ARB    Systolic heart failure  CHFrEF new dx    Continue amio gtt for afib  Continue BB  Add gentle diuresis with lasix 20 mg ivp daily    01/26/2025  Switch losartan to entresto 24/26 mg bid  Add aldactone 25 mg   Continue toproXL and Lasix 20 mg ivp daily  Fluid restriction 1.5 l a day  Ischemia work up as op if indicated        Elevated troponin  Demand ischemia >> ACS    Ischemia workup once afib and CHFrEF controlled          VTE Risk Mitigation (From admission, onward)           Ordered     apixaban tablet 5 mg  2 times daily         01/27/25 0823     IP VTE HIGH RISK PATIENT  Once         01/24/25 1204     Place sequential compression device  Until discontinued         01/24/25 1204                     Bill Fonseca MD  Cardiology  [facility] Tele[facility] ([facility])

## 2025-01-28 NOTE — SUBJECTIVE & OBJECTIVE
Interval History:     Review of Systems   Constitutional: Negative. Negative for weight gain.   HENT: Negative.     Eyes: Negative.    Cardiovascular: Negative.  Negative for chest pain, leg swelling and palpitations.   Respiratory: Negative.  Negative for shortness of breath.    Endocrine: Negative.    Hematologic/Lymphatic: Negative.    Skin: Negative.    Musculoskeletal:  Negative for muscle weakness.   Gastrointestinal: Negative.    Genitourinary: Negative.    Neurological: Negative.  Negative for dizziness.   Psychiatric/Behavioral: Negative.     Allergic/Immunologic: Negative.    All other systems reviewed and are negative.    Objective:     Vital Signs (Most Recent):  Temp: 97.5 °F (36.4 °C) (01/28/25 0723)  Pulse: 90 (01/28/25 0723)  Resp: 19 (01/28/25 0723)  BP: (!) 120/90 (01/28/25 0723)  SpO2: (!) 93 % (01/28/25 0723) Vital Signs (24h Range):  Temp:  [97.5 °F (36.4 °C)-98.6 °F (37 °C)] 97.5 °F (36.4 °C)  Pulse:  [] 90  Resp:  [16-19] 19  SpO2:  [91 %-97 %] 93 %  BP: (106-156)/() 120/90     Weight: 90.5 kg (199 lb 8.3 oz)  Body mass index is 27.06 kg/m².     SpO2: (!) 93 %         Intake/Output Summary (Last 24 hours) at 1/28/2025 0820  Last data filed at 1/28/2025 0340  Gross per 24 hour   Intake 363.66 ml   Output 1100 ml   Net -736.34 ml       Lines/Drains/Airways       Peripheral Intravenous Line  Duration                  Peripheral IV - Single Lumen 01/25/25 1855 22 G Anterior;Distal;Left Upper Arm 2 days         Peripheral IV - Single Lumen 01/26/25 0950 20 G Anterior;Proximal;Right Forearm 1 day                       Physical Exam  Cardiovascular:      Rate and Rhythm: Rhythm irregular.            Significant Labs: All pertinent lab results from the last 24 hours have been reviewed.    Significant Imaging: X-Ray: CXR: X-Ray Chest 1 View (CXR): No results found for this visit on 01/24/25.

## 2025-01-28 NOTE — PLAN OF CARE
Problem: Adult Inpatient Plan of Care  Goal: Plan of Care Review  Outcome: Met  Goal: Patient-Specific Goal (Individualized)  Outcome: Met  Goal: Absence of Hospital-Acquired Illness or Injury  Outcome: Met  Goal: Optimal Comfort and Wellbeing  Outcome: Met  Goal: Readiness for Transition of Care  Outcome: Met     Problem: Fall Injury Risk  Goal: Absence of Fall and Fall-Related Injury  Outcome: Met     Problem: Dysrhythmia  Goal: Normalized Cardiac Rhythm  Outcome: Met     Problem: Fatigue  Goal: Improved Activity Tolerance  Outcome: Met

## 2025-01-28 NOTE — PROGRESS NOTES
"[facility]) nurse navigator notified of H[facility] candidate in need of education and introduction to 4-6 week program.      PT aao x 3 while sitting up on the side of bed. Introduced self to pt as HFT[facility] nurse navigator.     Patient given "Home Care Guide for Heart Failure Patients" , "[facility]" flyer and "Daily weight and symptom tracker".  Encouraged pt to review information.      Reviewed the following key points of HFT[facility] program with pt and family:   1.) Take your medications as directed.    2.) Weight yourself daily   3.) Follow low salt and limited fluid diet.    4.) Stop smoking and start exercising   5.) Go to your appointments and call your team.      Pt reminded to follow Symptom tracker and to call at the onset of symptoms according to tracker.     Reviewed plan for follow up once discharged to include phone calls, in person and virtual visits to assist pt optimizing their heart failure medication regimen and encouraging healthy lifestyle modifications.  Reminded pt that program will assist them over the next 4-6 weeks and then patient will be transferred to long term care provider .  Reminded pt how to contact HFT[facility] navigator via phone and or via [employer].     Pt given appointment or instructed appointment will be printed on hospital discharge paperwork.     Pt also reminded HF nurse will call 48-72 hours after discharge to check on them.     PT verbalize read back of information given.  Encouraged pt and family to read over information often and contact team with any questions or concerns.      "

## 2025-01-28 NOTE — DISCHARGE SUMMARY
[facility] - Telemetry ([facility] Medicine  Discharge Summary      Patient Name: Jose Latham  MRN: [id number]  DALTON: [id number]  Patient Class: IP- Inpatient  Admission Date: 1/24/2025  Hospital Length of Stay: 4 days  Discharge Date and Time:  01/28/2025 11:15 AM  Attending Physician: Te Aldana MD   Discharging Provider: Te Aldana MD  Primary Care Provider: Ron Calle MD    Primary Care Team: [facility] MEDICINE D    HPI:   The patient is a 89 yo male with past medical history of arthritis, hypertension and melanoma who presented to the ED with shortness of breath. He reports symptoms started a week before presentation. He reports he is pretty active. He plays golf and dances on a regular basis. He had some cold symptoms but that has resolved. He denies chest pain. Workup in the ED revealed atrial fibrillation with RVR. Cardiology and hospital medicine consulted.     * No surgery found *      Hospital Course:   The patient presented with shortness of breath. He was found to be atrial fibrillation with RVR. He was placed on metoprolol. Cardiology was consulted. Echo obtained and revealed EF of 30%. He was placed on IV lasix daily. Cardiology recommended amiodarone and heparin gtts as rate not controlled.   1/27 remains on amio gtt. Cardiology further adjusting intravenous lasix. Does not wear supplemental oxygen at baseline. Endorses improvement in dyspnea symptoms.     1/28  No longer requiring supplemental oxygen. Improvement in sleep disturbance overnight. Heart rate controlled on po amio and beta blocker. After discussing with cardiology, ok to discharge home on current regimen.    No acute distress. No respiratory distress. On room air. Heart rate 80-90s, controlled. AO3.    Patient seen and evaluated by me. Patient was determined to be suitable for discharge. Patient deemed stable for discharge to home with nurse practitioner to visit home program to monitor blood pressures, follow up  appointments, and medication(s) compliance. No k supplemental prescribed as on entresto and spironolactone.       Goals of Care Treatment Preferences:  Code Status: Full Code      SDOH Screening:  The patient was screened for utility difficulties, food insecurity, transport difficulties, housing insecurity, and interpersonal safety and there were no concerns identified this admission.     Consults:   Consults (From admission, onward)          Status Ordering Provider     Inpatient consult to Cardiology  Once        Provider:  Jim Puente MD    Completed KALIE IRIZARRY            * Atrial fibrillation  Patient has  new onset  atrial fibrillation. Patient is currently in atrial fibrillation. [id number] Score: 2. The patients heart rate in the last 24 hours is as follows:  Pulse  Min: 85  Max: 106     Antiarrhythmics  amiodarone 360 mg/200 mL (1.8 mg/mL) infusion, Continuous, Intravenous  metoprolol tartrate (LOPRESSOR) tablet 50 mg, 3 times daily, Oral    Anticoagulants  apixaban tablet 5 mg, 2 times daily, Oral    Plan  - Replete lytes with a goal of K>4, Mg >2  - Patient is anticoagulated, see medications listed above.  -cardiology consulted  Remains on amio gtt  Heart rate improving      Systolic heart failure  -new diagnosis  -IV lasix  -possible outpatient ischemic workup  -Entresto added  -continue metoprolol  Remains on amio infusion per cardiology       Elevated troponin  -denies chest pain  -may be demand from afib with RVR      Essential hypertension  Patient's blood pressure range in the last 24 hours was: BP  Min: 113/83  Max: 160/95.The patient's inpatient anti-hypertensive regimen is listed below:  Current Antihypertensives  spironolactone tablet 25 mg, Daily, Oral  metoprolol tartrate (LOPRESSOR) tablet 50 mg, 3 times daily, Oral  furosemide injection 20 mg, Every 12 hours, Intravenous    Plan  - BP is controlled, no changes needed to their regimen  - losartan changed to Entresto  Lasix intravenous  increased to bid dosing      Final Active Diagnoses:    Diagnosis Date Noted POA    PRINCIPAL PROBLEM:  Atrial fibrillation [I48.91] 01/24/2025 Yes    Systolic heart failure [I50.20] 01/25/2025 Yes    Elevated troponin [R79.89] 01/24/2025 Yes    Essential hypertension [I10] 09/07/2016 Yes      Problems Resolved During this Admission:       Discharged Condition: stable    Disposition:     Follow Up:   Follow-up Information       Ron Calle MD. Schedule an appointment as soon as possible for a visit in 3 day(s).    Specialty: Internal Medicine  Why: hospital follow up  Contact information:  [address]  [address]  [place] [address] [phone]               Bill Fonseca MD. Schedule an appointment as soon as possible for a visit in 1 week(s).    Specialties: Interventional Cardiology, Cardiology  Why: hospital follow up  Contact information:  [address]  [place] [address] [phone]               Abby Roberts PA. Schedule an appointment as soon as possible for a visit in 3 day(s).    Specialty: Transplant  Why: hospital follow up  Contact information:  [address] [id number] [phone]                           Patient Instructions:      Ambulatory referral/consult to [facility] Failure [facility]   Standing Status: Future   Referral Priority: Routine Referral Type: Consultation   Referral Reason: Specialty Services Required   Requested Specialty: Cardiology   Number of Visits Requested: 1     Ambulatory referral/consult to [facility]     Diet Cardiac     Activity as tolerated       Significant Diagnostic Studies: Labs: CMP   Recent Labs   Lab 01/27/25  0535   *   K 4.2      CO2 20*   *   BUN 19   CREATININE 1.1   CALCIUM 9.1   PROT 6.8   ALBUMIN 3.5   BILITOT 0.6   ALKPHOS 63   AST 27   ALT 31   ANIONGAP 14   , CBC   Recent Labs   Lab 01/27/25  0535   WBC 8.81   HGB 13.7*   HCT 42.7      , INR    Lab Results   Component Value Date    INR 1.0 01/25/2025    INR 1.0 01/24/2025   , Troponin   Recent Labs   Lab 01/24/25  0912 01/24/25  1408 01/24/25  1856   TROPONINI 0.205* 0.130* 0.164*   , and All labs within the past 24 hours have been reviewed  Microbiology: flu negative   Radiology: X-Ray: CXR: low lung volumes  Cardiac Graphics: Echocardiogram: Transthoracic echo (TTE) complete (Cupid Only):   Results for orders placed or performed during the hospital encounter of 01/24/25   Echo   Result Value Ref Range    BSA 2.14 m2    LVOT stroke volume 40.8 cm3    LVIDd 5.1 3.5 - 6.0 cm    LV Systolic Volume 83.48 mL    LV Systolic Volume Index 39.4 mL/m2    LVIDs 4.3 (A) 2.1 - 4.0 cm    LV ESV A2C 121.99 mL    LV Diastolic Volume 121.72 mL    LV ESV A4C 122.14 mL    LV Diastolic Volume Index 57.42 mL/m2    Left Ventricular End Systolic Volume by Teichholz Method 83.48 mL    Left Ventricular End Diastolic Volume by Teichholz Method 121.72 mL    IVS 0.9 0.6 - 1.1 cm    LVOT diameter 2.1 cm    LVOT area 3.5 cm2    FS 15.7 (A) 28 - 44 %    Left Ventricle Relative Wall Thickness 0.31 cm    PW 0.8 0.6 - 1.1 cm    LV mass 151.8 g    LV Mass Index 71.6 g/m2    MV Peak E Gasper 0.86 m/s    TDI LATERAL 0.09 m/s    TDI SEPTAL 0.08 m/s    E/E' ratio 10 m/s    MV Peak A Gasper 0.26 m/s    TR Max Gasper 2.7 m/s    E/A ratio 3.31     IVRT 94 msec    E wave deceleration time 174 msec    LV SEPTAL E/E' RATIO 10.8 m/s    LV LATERAL E/E' RATIO 9.6 m/s    PV Peak S Gasper 0.24 m/s    PV Peak D Gasper 0.63 m/s    Pulm vein S/D ratio 0.38     LVOT peak gasper 0.7 m/s    Left Ventricular Outflow Tract Mean Velocity 0.47 cm/s    Left Ventricular Outflow Tract Mean Gradient 0.99 mmHg    RV- holt basal diam 3.9 cm    RVOT peak VTI 7.6 cm    TAPSE 1.30 cm    RV/LV Ratio 0.76 cm    LA size 4.7 cm    Left Atrium Minor Axis 6.4 cm    Left Atrium Major Axis 7.3 cm    LA Vol (MOD) 130 mL    LUCIANA (MOD) 61 mL/m2    RA Major Axis 5.81 cm    AV regurgitation pressure  1/2 time 316 ms    AR Max Gasper 2.40 m/s    AV mean gradient 3 mmHg    AV peak gradient 4 mmHg    Ao peak gasper 1.0 m/s    Ao VTI 18.5 cm    LVOT peak VTI 11.8 cm    AV valve area 2.2 cm²    AV Velocity Ratio 0.70     AV index (prosthetic) 0.64     EUGENIO by Velocity Ratio 2.4 cm²    MV stenosis pressure 1/2 time 50.58 ms    MV valve area p 1/2 method 4.35 cm2    Triscuspid Valve Regurgitation Peak Gradient 29 mmHg    PV mean gradient 1 mmHg    PV PEAK VELOCITY 0.63 m/s    PV peak gradient 2 mmHg    Pulmonary Valve Mean Velocity 0.49 m/s    RVOT peak gasper 0.52 m/s    Ao root annulus 3.83 cm    STJ 3.29 cm    Ascending aorta 3.13 cm    Mean e' 0.09 m/s    ZLVIDS 0.40     ZLVIDD -2.73     LA area A4C 33.44 cm2    LA area A2C 31.01 cm2    RVDD 3.92 cm    LUCIANA 69 mL/m2    LA Vol 147 cm3    LA WIDTH 5.4 cm    RA Width 4.6 cm    TV resting pulmonary artery pressure 32 mmHg    RV TB RVSP 6 mmHg    Est. RA pres 3 mmHg    Narrative      Left Ventricle: The left ventricle is normal in size. Ventricular mass   is normal. Normal wall thickness. There is moderately reduced systolic   function with a visually estimated ejection fraction of 30 - 35%. Unable   to assess diastolic function due to atrial fibrillation.    Right Ventricle: Normal right ventricular cavity size. Wall thickness   is normal. Systolic function is moderately reduced.    Left Atrium: Left atrium is severely dilated.    Right Atrium: Right atrium is dilated.    Tricuspid Valve: There is mild regurgitation.    Pulmonary Artery: The estimated pulmonary artery systolic pressure is   32 mmHg.    IVC/SVC: Normal venous pressure at 3 mmHg.         Pending Diagnostic Studies:       None           Medications:  Reconciled Home Medications:      Medication List        START taking these medications      amiodarone 200 MG Tab  Commonly known as: PACERONE  Take 1 tablet (200 mg total) by mouth once daily.  Start taking on: January 29, 2025     apixaban 5 mg Tab  Commonly known  as: ELIQUIS  Take 1 tablet (5 mg total) by mouth 2 (two) times daily.     furosemide 20 MG tablet  Commonly known as: LASIX  Take 1 tablet (20 mg total) by mouth 2 (two) times daily.     metoprolol tartrate 50 MG tablet  Commonly known as: LOPRESSOR  Take 1 tablet (50 mg total) by mouth 3 (three) times daily.     sacubitriL-valsartan 24-26 mg per tablet  Commonly known as: ENTRESTO  Take 1 tablet by mouth 2 (two) times daily.     spironolactone 25 MG tablet  Commonly known as: ALDACTONE  Take 1 tablet (25 mg total) by mouth once daily.  Start taking on: January 29, 2025            CONTINUE taking these medications      aspirin 81 MG Chew  Take 81 mg by mouth once daily.     melatonin 3 mg tablet  Commonly known as: MELATIN  Take 3 mg by mouth every evening.     multivit-min-FA-lycopen-lutein 0.4 mg-300 mcg- 250 mcg Tab  Take 1 tablet by mouth once daily.            STOP taking these medications      losartan 25 MG tablet  Commonly known as: COZAAR     meloxicam 15 MG tablet  Commonly known as: MOBIC              Indwelling Lines/Drains at time of discharge:   Lines/Drains/Airways       None                   Time spent on the discharge of patient: 31 minutes         Te Aldana MD  Department of Hospital Medicine  [facility] - Telemetry ([facility])

## 2025-01-30 ENCOUNTER — PATIENT OUTREACH (OUTPATIENT)
Dept: ADMINISTRATIVE | Facility: CLINIC | Age: OVER 89
End: 2025-01-30
Payer: MEDICARE

## 2025-01-30 ENCOUNTER — TELEPHONE (OUTPATIENT)
Dept: CARDIOLOGY | Facility: CLINIC | Age: OVER 89
End: 2025-01-30
Payer: MEDICARE

## 2025-01-30 DIAGNOSIS — Z00.00 ENCOUNTER FOR MEDICARE ANNUAL WELLNESS EXAM: ICD-10-CM

## 2025-01-30 NOTE — PROGRESS NOTES
C3 nurse attempted to contact Jose Latham  for a TCC post hospital discharge follow up call. No answer. Left voicemail with callback information. The patient has a scheduled HOSFU appointment with Ron Calle MD  on 2/1/25 @ 6295.

## 2025-01-30 NOTE — TELEPHONE ENCOUNTER
"Heart Failure [facility]itional [facility]) hospital discharge 48-72 hour phone follow up completed.     Most Recent Hospital Discharge Date: 1/28/25  Last admission Diagnosis/chief complaint:Afib/CHF    TCC nurse Navigator spoke with pt    Current Patient reported weight: 180.6 lbs    Current Patient reported blood pressure and heart rate:  B/P 123/77    Pt reports the following:  []  Shortness of Breath with Activity  []  Shortness of Breath at rest   []  Fatigue  []  Edema   [] Chest pain or tightness  [] Weight Increase since discharge  [x] None of the above    Medications:   Discharge medication reviewed with pt.  Pt reports having medication list available and has all medications at home for use per list.     Education:   Confirmed pt received "Home Care Guide for Heart Failure Patients" while admitted.   Reviewed key points as listed below.     Recommend 2 -3 gram sodium restriction and 1500 cc-2000 cc fluid restriction.  Encourage physical activity with graded exercise program.  Requested patient to weigh themselves daily, and to notify us if their weight increases by more than 3 lbs in 1 day or 5 lbs in 3 days.   Reminded patient to use "Daily weight and symptom tracker" to record and guide patient on when and how to call HFTCC. PT may also use symptom tracker if no scale available  Pt reports being in the green(color) Zone. If in yellow/red, reminded that they should be calling HFTCC today or now.     Watch for these Signs and Symptoms: If any of these occur, contact HFTCC immediately:   Increase in shortness of breath with movement   Increase in swelling in your legs and ankles   Weight gain of more than 3 pounds in a day or 5 pounds in 3 days.   Difficulty breathing when you are lying down   Worsening fatigue or tiredness   Stomach bloating, a full feeling or a loss of appetite   Increased coughing--especially when you are lying down      Pt was able to verbalize back to nurse in their own words " correct diet/fluid restrictions, necessity for exercise, warning signs and symptoms, when and how to contact their TCC team.      Pt educated on follow-up plan while in HFT[facility] program to include:   Week 1 -  F/u appt with Provider and HF nurse (Date) 2/4/25 at 9:30 am with Abby Roberts PA-C   Week 2-5 - In person/ Virtual/ phone call check ins    Week 5-7 - Pt will discharge from [facility] and transition to longterm care provider (Cardiology/PCP/ Advanced Heart Failure).      Patient active on myChart? Yes      Pt given the following contact information for ease of communication: [phone] (Mon-Fri, 8a-5p) & for urgent issues on the weekend call Michael on-call [phone] to page the Cardiology MD on call.  Pt also encouraged utilize [facility] messaging as well.      Will follow up with pt at first clinic visit and HF nurse navigator available for pt questions, issues or concerns.

## 2025-01-31 ENCOUNTER — PATIENT MESSAGE (OUTPATIENT)
Dept: ADMINISTRATIVE | Facility: CLINIC | Age: OVER 89
End: 2025-01-31
Payer: MEDICARE

## 2025-01-31 NOTE — PROGRESS NOTES
25 @ 0824  2nd Attempt made to reach patient for TCC call. Left voicemail please call [phone] leave first name, last name, and  for Carminelucrecia I will return your call.    Message sent via [facility] portal for Post Discharge Attempt.     Yes

## 2025-01-31 NOTE — PROGRESS NOTES
3rd Attempt made to reach patient for TCC call. Left voicemail please call [phone] leave first name, last name, and  for Jean Carlos I will return your call.

## 2025-02-01 ENCOUNTER — OFFICE VISIT (OUTPATIENT)
Dept: INTERNAL MEDICINE | Facility: CLINIC | Age: OVER 89
End: 2025-02-01
Payer: MEDICARE

## 2025-02-01 VITALS
BODY MASS INDEX: 24.85 KG/M2 | HEART RATE: 79 BPM | DIASTOLIC BLOOD PRESSURE: 78 MMHG | TEMPERATURE: 97 F | WEIGHT: 183.19 LBS | SYSTOLIC BLOOD PRESSURE: 120 MMHG | OXYGEN SATURATION: 99 %

## 2025-02-01 DIAGNOSIS — I48.19 PERSISTENT ATRIAL FIBRILLATION: Primary | ICD-10-CM

## 2025-02-01 DIAGNOSIS — J84.10 PULMONARY FIBROSIS, UNSPECIFIED: ICD-10-CM

## 2025-02-01 DIAGNOSIS — I50.21 ACUTE SYSTOLIC HEART FAILURE: ICD-10-CM

## 2025-02-01 DIAGNOSIS — H35.3222 EXUDATIVE AGE-RELATED MACULAR DEGENERATION OF LEFT EYE WITH INACTIVE CHOROIDAL NEOVASCULARIZATION: ICD-10-CM

## 2025-02-01 DIAGNOSIS — I10 ESSENTIAL HYPERTENSION: ICD-10-CM

## 2025-02-01 PROBLEM — R79.89 ELEVATED TROPONIN: Status: RESOLVED | Noted: 2025-01-24 | Resolved: 2025-02-01

## 2025-02-01 PROCEDURE — 99999 PR PBB SHADOW E&M-EST. PATIENT-LVL III: CPT | Mod: PBBFAC,HCNC,, | Performed by: INTERNAL MEDICINE

## 2025-02-01 PROCEDURE — 1160F RVW MEDS BY RX/DR IN RCRD: CPT | Mod: HCNC,CPTII,S$GLB, | Performed by: INTERNAL MEDICINE

## 2025-02-01 PROCEDURE — 3288F FALL RISK ASSESSMENT DOCD: CPT | Mod: HCNC,CPTII,S$GLB, | Performed by: INTERNAL MEDICINE

## 2025-02-01 PROCEDURE — 1126F AMNT PAIN NOTED NONE PRSNT: CPT | Mod: HCNC,CPTII,S$GLB, | Performed by: INTERNAL MEDICINE

## 2025-02-01 PROCEDURE — 1101F PT FALLS ASSESS-DOCD LE1/YR: CPT | Mod: HCNC,CPTII,S$GLB, | Performed by: INTERNAL MEDICINE

## 2025-02-01 PROCEDURE — 1159F MED LIST DOCD IN RCRD: CPT | Mod: HCNC,CPTII,S$GLB, | Performed by: INTERNAL MEDICINE

## 2025-02-01 PROCEDURE — 99495 TRANSJ CARE MGMT MOD F2F 14D: CPT | Mod: HCNC,S$GLB,, | Performed by: INTERNAL MEDICINE

## 2025-02-01 PROCEDURE — 1111F DSCHRG MED/CURRENT MED MERGE: CPT | Mod: HCNC,CPTII,S$GLB, | Performed by: INTERNAL MEDICINE

## 2025-02-01 NOTE — PROGRESS NOTES
Subjective:       Patient ID: Jose Latham is a 89 y.o. male.    Chief Complaint: Hospital Follow Up      HPI:  History of Present Illness    89-year-old male presents for follow up after recent hospitalization for atrial fibrillation    He reports feeling back to normal since his recent hospitalization, with the exception of experiencing frequent urination and some residual weakness.    He reports improvement in knee symptoms but continues to experience falls. He has an upcoming orthopedic appointment scheduled.    He lives with his son and daughter who provide adequate support at home. He denies need for home health services and reports all needs are being met.       Transitional Care Note    Family and/or Caretaker present at visit?  No.  Diagnostic tests reviewed/disposition: No diagnosic tests pending after this hospitalization.  Disease/illness education: afib, chf  Home health/community services discussion/referrals: Patient does not have home health established from hospital visit.  They do not need home health.  If needed, we will set up home health for the patient.   Establishment or re-establishment of referral orders for community resources: No other necessary community resources.   Discussion with other health care providers: No discussion with other health care providers necessary.         Review of Systems   Constitutional:  Positive for fatigue. Negative for fever and unexpected weight change.   HENT:  Negative for hearing loss, postnasal drip and rhinorrhea.    Eyes:  Negative for pain and visual disturbance.   Respiratory:  Negative for cough, shortness of breath and wheezing.    Cardiovascular:  Negative for chest pain and palpitations.   Gastrointestinal:  Negative for constipation, diarrhea, nausea and vomiting.   Genitourinary:  Negative for dysuria and hematuria.   Musculoskeletal:  Negative for arthralgias, back pain, myalgias and neck stiffness.   Skin:  Negative for pallor and rash.    Neurological:  Negative for seizures, syncope and headaches.   Hematological:  Negative for adenopathy.   Psychiatric/Behavioral:  Negative for dysphoric mood. The patient is not nervous/anxious.        Objective:   /78   Pulse 79   Temp 97.4 °F (36.3 °C) (Tympanic)   Wt 83.1 kg (183 lb 3.2 oz)   SpO2 99%   BMI 24.85 kg/m²      Physical Exam  Vitals reviewed.   Constitutional:       General: He is not in acute distress.     Appearance: He is well-developed.   HENT:      Head: Normocephalic and atraumatic.      Right Ear: Tympanic membrane and ear canal normal.      Left Ear: Tympanic membrane and ear canal normal.   Eyes:      Pupils: Pupils are equal, round, and reactive to light.   Neck:      Thyroid: No thyromegaly.      Vascular: No JVD.   Cardiovascular:      Rate and Rhythm: Normal rate. Rhythm regularly irregular.      Heart sounds: Normal heart sounds. No murmur heard.     No friction rub. No gallop.   Pulmonary:      Effort: Pulmonary effort is normal.      Breath sounds: Normal breath sounds. No wheezing or rales.   Abdominal:      General: Bowel sounds are normal. There is no distension.      Palpations: Abdomen is soft.      Tenderness: There is no abdominal tenderness. There is no guarding or rebound.   Musculoskeletal:         General: Normal range of motion.      Cervical back: Normal range of motion and neck supple.   Lymphadenopathy:      Cervical: No cervical adenopathy.   Skin:     General: Skin is warm and dry.      Findings: No rash.   Neurological:      General: No focal deficit present.      Mental Status: He is alert and oriented to person, place, and time.      Cranial Nerves: No cranial nerve deficit.      Deep Tendon Reflexes: Reflexes are normal and symmetric.   Psychiatric:         Mood and Affect: Mood normal.         Judgment: Judgment normal.         No results displayed because visit has over 200 results.          Assessment:       1. Persistent atrial fibrillation    2.  Acute systolic heart failure    3. Essential hypertension    4. Pulmonary fibrosis, unspecified    5. Exudative age-related macular degeneration of left eye with inactive choroidal neovascularization        Plan:   No problem-specific Assessment & Plan notes found for this encounter.    Jose was seen today for hospital follow up.    Diagnoses and all orders for this visit:    Persistent atrial fibrillation    Acute systolic heart failure    Essential hypertension    Pulmonary fibrosis, unspecified  Comments:  no breathing issues at this time.  continue monitoring    Exudative age-related macular degeneration of left eye with inactive choroidal neovascularization  Comments:  Continue routine follow up with ophthalmology. no changes today      Assessment & Plan    PULMONARY FIBROSIS, UNSPECIFIED:  - Performed physical exam, including auscultation of the patient's lungs.  - Patient's breathing was normal, and they report feeling back to normal.    ATRIAL FIBRILLATION:  - Confirmed patient's current atrial fibrillation status.  - Explained AFib mechanism and associated stroke risk to the patient, reassuring that it's manageable and compatible with a full life while acknowledging the ongoing risk of reverting to AFib.  - Continued amiodarone for AFib control and potential conversion to sinus rhythm, and maintained Metoprolol for heart rate control.  - Continued anticoagulation therapy for stroke prevention, educating the patient about increased bleeding risk.  - Instructed patient to seek immediate medical attention if head injury occurs while on anticoagulation.    CONGESTIVE HEART FAILURE:  - Assessed patient's congestive heart failure, noting diminished but manageable pump function with current medication regimen.  - Continued diuretic therapy for fluid management, considering potential dosage decrease to 1 tablet daily at the upcoming heart failure clinic follow-up.  - Confirmed overall stable condition compatible  with normal daily activities.  - Scheduled follow-up visit on Tuesday at the heart failure clinic.    KNEE ISSUES:  - Monitored ongoing knee issues, noting improvement in the patient's condition.  - Confirmed upcoming appointment for knee evaluation.    FOLLOW UP:  - Patient to continue regular exercise, including dancing activities, and resume normal daily activities to regain strength after hospital stay.  - Follow up in approximately 6 weeks to assess progress and on Friday with Dr. Fonseca as scheduled.         Follow up in about 6 weeks (around 3/15/2025) for afib, chf, with Brenna Ham.    This note was generated with the assistance of ambient listening technology. Verbal consent was obtained by the patient and accompanying visitor(s) for the recording of patient appointment to facilitate this note

## 2025-02-03 ENCOUNTER — TELEPHONE (OUTPATIENT)
Dept: HOME HEALTH SERVICES | Facility: CLINIC | Age: OVER 89
End: 2025-02-03
Payer: MEDICARE

## 2025-02-04 ENCOUNTER — OFFICE VISIT (OUTPATIENT)
Dept: CARDIOLOGY | Facility: CLINIC | Age: OVER 89
End: 2025-02-04
Payer: MEDICARE

## 2025-02-04 VITALS
SYSTOLIC BLOOD PRESSURE: 88 MMHG | HEART RATE: 70 BPM | HEIGHT: 72 IN | DIASTOLIC BLOOD PRESSURE: 60 MMHG | BODY MASS INDEX: 24.58 KG/M2 | WEIGHT: 181.44 LBS

## 2025-02-04 DIAGNOSIS — I10 ESSENTIAL HYPERTENSION: Primary | ICD-10-CM

## 2025-02-04 DIAGNOSIS — I48.0 PAROXYSMAL ATRIAL FIBRILLATION: ICD-10-CM

## 2025-02-04 DIAGNOSIS — I50.21 ACUTE SYSTOLIC HEART FAILURE: ICD-10-CM

## 2025-02-04 PROCEDURE — 1159F MED LIST DOCD IN RCRD: CPT | Mod: HCNC,CPTII,S$GLB, | Performed by: PHYSICIAN ASSISTANT

## 2025-02-04 PROCEDURE — 1160F RVW MEDS BY RX/DR IN RCRD: CPT | Mod: HCNC,CPTII,S$GLB, | Performed by: PHYSICIAN ASSISTANT

## 2025-02-04 PROCEDURE — 99204 OFFICE O/P NEW MOD 45 MIN: CPT | Mod: HCNC,S$GLB,, | Performed by: PHYSICIAN ASSISTANT

## 2025-02-04 PROCEDURE — 1111F DSCHRG MED/CURRENT MED MERGE: CPT | Mod: HCNC,CPTII,S$GLB, | Performed by: PHYSICIAN ASSISTANT

## 2025-02-04 PROCEDURE — 1126F AMNT PAIN NOTED NONE PRSNT: CPT | Mod: HCNC,CPTII,S$GLB, | Performed by: PHYSICIAN ASSISTANT

## 2025-02-04 PROCEDURE — 99999 PR PBB SHADOW E&M-EST. PATIENT-LVL III: CPT | Mod: PBBFAC,HCNC,, | Performed by: PHYSICIAN ASSISTANT

## 2025-02-04 NOTE — PROGRESS NOTES
HF TCC Provider Note (Follow-up) Consult Note    The patient is a 89 yo male with past medical history of arthritis, hypertension and melanoma who presented to the ED with shortness of breath. He reports symptoms started a week before presentation. He reports he is pretty active. He plays golf and dances on a regular basis. He had some cold symptoms but that has resolved. He denies chest pain. Workup in the ED revealed atrial fibrillation with RVR. Cardiology and hospital medicine consulted.     The patient presented with shortness of breath. He was found to be atrial fibrillation with RVR. He was placed on metoprolol. Cardiology was consulted. Echo obtained and revealed EF of 30%. He was placed on IV lasix daily. Cardiology recommended amiodarone and heparin gtts as rate not controlled.   1/27 remains on amio gtt. Cardiology further adjusting intravenous lasix. Does not wear supplemental oxygen at baseline. Endorses improvement in dyspnea symptoms.      1/28  No longer requiring supplemental oxygen. Improvement in sleep disturbance overnight. Heart rate controlled on po amio and beta blocker. After discussing with cardiology, ok to discharge home on current regimen.     No acute distress. No respiratory distress. On room air. Heart rate 80-90s, controlled. AO3.     Patient seen and evaluated by me. Patient was determined to be suitable for discharge. Patient deemed stable for discharge to home with nurse practitioner to visit home program to monitor blood pressures, follow up appointments, and medication(s) compliance. No k supplemental prescribed as on entresto and spironolactone.    He is unsure of meds today, knows he has eliquis    In a fib but rate controlled    Sees Dr Calle       HPI:    PHYSICAL:   Vitals:    02/04/25 0916   BP: (!) 88/60   Pulse: 70          JVD: no,    Heart rhythm: irregular  Cardiac murmur: No    S3: no  S4: no  Lungs: clear  Liver span: 10 cm:   Hepatojugular reflux: no  Edema: no,        ASSESSMENT: atrial fibrillation    PLAN:      Patient Instructions:   Instruct the patient to notify this clinic if HH, a physician or an advanced care provider wants to change medication one of their HF medications   Activity and Diet restrictions:   Recommend 2-3 gram sodium restriction and 1500cc- 2000cc fluid restriction.  Encourage physical activity with graded exercise program.  Requested patient to weigh themselves daily, and to notify us if their weight increases by more than 3 lbs in 1 day or 5 lbs in 3 days.    Assigned dry weight on home scale: daily weight  Medication changes (include current dose and changed dose)  Needs follow up dc labs  CHF education done  Continue eliquis, rate control for atrial fibrillation  GDMT with ARNi and BB  No addition with BP  RTC 5 months with echo

## 2025-02-05 NOTE — PROGRESS NOTES
Patient ID: Jose Latham  YOB: 1934  MRN: [id number]    Referred By: Dr. Calle    Occupation: Retired      History of Present Illness: Jose Latham is a 89 y.o. male who presents today with Pain of the Left Knee and Pain of the Right Knee     History of Present Illness    HPI:  Jose presents with bilateral knee pain, primarily affecting the left knee. Pain is located on the inner aspect of his knees, particularly the left. His knee problems began in November 2024, approximately 7-8 months ago. Pain was initially severe enough to interfere with sleep, with the patient reporting significant discomfort even when lying on his right side.    He is an active individual who plays golf and dances twice weekly. Pain tends to worsen after these activities. Recently, he experienced a period of reduced activity due to a cold and atrial fibrillation (a-fib), during which pain subsided. However, upon resuming dance activities last week, he noticed pain returning.    His medication regimen has recently changed significantly. He was previously only taking a small blood pressure pill but is now on multiple new medications, including Eliquis for a-fib and Amiodarone. These medication changes occurred within the last few weeks. Prior to these changes, he had been using Advil for pain management, but he is no longer taking it due to potential interactions with his new medications.    Jose denies any previous issues with his knees before the onset of the current problem. He denies any history of knee-related medical conditions prior to this episode. Advil (ibuprofen) for knee pain.  Golf and dancing twice weekly, which has helped manage knee pain to some extent.    MEDICATIONS:  - Eliquis: for atrial fibrillation, recently started  - Amiodarone: recently started  - Small blood pressure pill: was taking before recent medication changes         Past Medical History:   Past Medical History:   Diagnosis Date     Essential hypertension     Macular degeneration of both eyes - Both Eyes 8/22/2014    Melanoma     left leg, removed years ago    Nuclear sclerosis - Both Eyes 8/21/2013     Past Surgical History:   Procedure Laterality Date    ADENOIDECTOMY      CATARACT EXTRACTION W/  INTRAOCULAR LENS IMPLANT Right 05/16/2018        CATARACT EXTRACTION W/  INTRAOCULAR LENS IMPLANT Left 07/18/2018        INTRAOCULAR PROSTHESES INSERTION Left 7/18/2018    Procedure: INSERTION, INTRAOCULAR LENS PROSTHESIS;  Surgeon: Mari Dobson MD;  Location: [facility] OR [address] [place] [address];  Service: Ophthalmology;  Laterality: Left;    MALIGNANT SKIN LESION EXCISION      PHACOEMULSIFICATION OF CATARACT Left 7/18/2018    Procedure: PHACOEMULSIFICATION, CATARACT;  Surgeon: Mari Dobson MD;  Location: [facility] OR [address] [place] [address];  Service: Ophthalmology;  Laterality: Left;    TONSILLECTOMY      YAG CAPSULOTOMY Right 05/20/2021         Family History   Problem Relation Name Age of Onset    Stroke Sister x3     Hypertension Sister x3     Cancer Sister x3         Stomach    Stomach cancer Sister x3     Heart disease Sister x3     Aneurysm Sister x3     No Known Problems Mother      Alcohol abuse Father      No Known Problems Maternal Aunt      No Known Problems Maternal Uncle      No Known Problems Paternal Aunt      No Known Problems Paternal Uncle      No Known Problems Maternal Grandmother      No Known Problems Maternal Grandfather      No Known Problems Paternal Grandmother      No Known Problems Paternal Grandfather      No Known Problems Son x2     Amblyopia Neg Hx      Blindness Neg Hx      Cataracts Neg Hx      Diabetes Neg Hx      Glaucoma Neg Hx      Macular degeneration Neg Hx      Retinal detachment Neg Hx      Strabismus Neg Hx      Thyroid disease Neg Hx       Social History     Socioeconomic History    Marital status:     Number of children: 2   Occupational History    Occupation: Retired - [employer]  company   Tobacco Use    Smoking status: Former     Current packs/day: 0.00     Average packs/day: 1 pack/day for 15.0 years (15.0 ttl pk-yrs)     Types: Cigarettes     Start date: 5/15/1949     Quit date: 5/15/1964     Years since quittin.7     Passive exposure: Past    Smokeless tobacco: Never   Substance and Sexual Activity    Alcohol use: Yes     Alcohol/week: 2.0 standard drinks of alcohol     Types: 2 Cans of beer per week    Drug use: No    Sexual activity: Not Currently     Partners: Female     Social Drivers of Health     Financial Resource Strain: Low Risk  (2025)    Overall Financial Resource Strain (CARDIA)     Difficulty of Paying Living Expenses: Not hard at all   Food Insecurity: No Food Insecurity (2025)    Hunger Vital Sign     Worried About Running Out of Food in the Last Year: Never true     Ran Out of Food in the Last Year: Never true   Transportation Needs: No Transportation Needs (2025)    TRANSPORTATION NEEDS     Transportation : No   Stress: No Stress Concern Present (2025)    [employer] [place] [employer] - Occupational Stress Questionnaire     Feeling of Stress : Not at all   Housing Stability: Low Risk  (2025)    Housing Stability Vital Sign     Unable to Pay for Housing in the Last Year: No     Homeless in the Last Year: No     Medication List with Changes/Refills   Current Medications    AMIODARONE (PACERONE) 200 MG TAB    Take 1 tablet (200 mg total) by mouth once daily.    APIXABAN (ELIQUIS) 5 MG TAB    Take 1 tablet (5 mg total) by mouth 2 (two) times daily.    ASPIRIN 81 MG CHEW    Take 81 mg by mouth once daily.    FUROSEMIDE (LASIX) 20 MG TABLET    Take 1 tablet (20 mg total) by mouth 2 (two) times daily.    MELATONIN 3 MG TAB    Take 3 mg by mouth every evening.     METOPROLOL TARTRATE (LOPRESSOR) 50 MG TABLET    Take 1 tablet (50 mg total) by mouth 3 (three) times daily.    MULTIVIT-MIN-FA-LYCOPEN-LUTEIN 0.4-300-250 MG-MCG-MCG TAB    Take  1 tablet by mouth once daily.     SACUBITRIL-VALSARTAN (ENTRESTO) 24-26 MG PER TABLET    Take 1 tablet by mouth 2 (two) times daily.    SPIRONOLACTONE (ALDACTONE) 25 MG TABLET    Take 1 tablet (25 mg total) by mouth once daily.     Review of patient's allergies indicates:  No Known Allergies    Physical Exam:   There is no height or weight on file to calculate BMI.    Detailed MSK exam:     Left Knee:  Inspection: Genu varus  Palpation tenderness: Medial joint line  Range of motion: 0 deg extension - 130 deg flexion  Strength:  5/5 Extension    5/5 Flexion  Stability: Stable ACL/Lachman      Stable Posterior Drawer      1+ with firm endpoint to Valgus Stress      Stable Varus Stress  Patella Exam: Negative J-sign   Negative Patellar apprehension   Positive Patellar crepitus   N/V Exam:  Tibial:    Normal sensory (plantar foot)  Normal motor (FHL)    Sup Peroneal:  Normal sensory (dorsal foot)  Normal motor (Peroneals)            Deep Peroneal:  Normal sensory (1st web space) Normal motor (EHL)    Sural:   Normal sensory (lateral foot)   Saphenous:   Normal sensory (medial lower leg)   Normal pedal pulses, warm and well perfused with capillary refill < 2 sec       Right Knee:  Inspection: Genu varus  Palpation tenderness: Medial joint line  Range of motion: 0 deg extension - 130 deg flexion  Strength:  5/5 Extension    5/5 Flexion  Stability: Stable ACL/Lachman      Stable Posterior Drawer      1+ with firm endpoint to Valgus Stress      Stable Varus Stress  Patella Exam: Negative J-sign   Negative Patellar apprehension   Positive Patellar crepitus   N/V Exam:  Tibial:    Normal sensory (plantar foot)  Normal motor (FHL)    Sup Peroneal:  Normal sensory (dorsal foot)  Normal motor (Peroneals)            Deep Peroneal:  Normal sensory (1st web space) Normal motor (EHL)    Sural:   Normal sensory (lateral foot)   Saphenous:   Normal sensory (medial lower leg)   Normal pedal pulses, warm and well perfused with capillary  refill < 2 sec      Imaging:     XR Results:  Results for orders placed during the hospital encounter of 01/16/25    X-ray Knee Ortho Bilateral    Narrative  EXAMINATION:  XR KNEE ORTHO BILAT    CLINICAL HISTORY:  Pain in right knee    TECHNIQUE:  AP standing, lateral and Merchant views of both knees were performed.    COMPARISON:  None    FINDINGS:  No acute osseous abnormality.  Joint spaces relatively maintained with minimal to mild degenerative findings bilaterally.  No large joint effusion.  Arterial vascular calcifications present.    Impression  As above      Electronically signed by: Gus Alberto MD  Date:    01/16/2025  Time:    09:50    Relevant imaging results were reviewed and interpreted by me and per my read:  Moderate degenerative changes about both knees, more prominent in the patellofemoral compartment.  Normal alignment.  No fractures or other acute abnormalities.    This was discussed with the patient and / or family today.     Patient Instructions   Assessment:  Jose Latham is a 89 y.o. male with a chief complaint of Pain of the Left Knee and Pain of the Right Knee    Encounter Diagnoses   Name Primary?    Primary osteoarthritis of both knees Yes    Chronic pain of both knees     Chronic anticoagulation       Plan:  Patient with chronic bilateral knee pain due to underlying degenerative osteoarthritis.  Left as a bit more severe than the right.  X-rays today showing moderate degenerative changes of both knees.  Diagnosis, treatment options, prognosis discussed today.    Recommend conservative management.    Recommend to avoid NSAIDs, such as ibuprofen, Aleve, as you are now taking blood thinner Eliquis.  For pain, you can use Tylenol, 1-2 extra-strength tablets, twice per day as needed.  Would recommend to ice her knee after exercise/dancing/golfing, etc., for 15-20 minutes at a time.  Would recommend to stretch, flexor knee, at least daily to avoid stiffness.  If your knee pain is not  improving, we can consider for alternative treatments, such as corticosteroid injections, or more long-acting viscosupplementation gel injections.    Follow-up:  1 month or sooner if there are any problems between now and then.    Thank you for choosing [facility] and Dr. Loco Sue for your orthopedic & sports medicine care. It is our goal to provide you with exceptional care that will help keep you healthy, active, and get you back in the game.    Please do not hesitate to reach out to us via email, phone, or MyChart with any questions, concerns, or feedback.    If you are experiencing pain/discomfort ,or have questions after 5pm and would like to be connected to the [facility] on-call team, please call this number and specify which Sports Medicine provider is treating you: [phone]       A copy of today's visit note has been sent to the referring provider.           Loco Sue MD  Primary Care Sports Medicine    Disclaimer: This note was prepared using a voice recognition system and is likely to have sound alike errors within the text.     This note was generated with the assistance of ambient listening technology. Verbal consent was obtained by the patient and accompanying visitor(s) for the recording of patient appointment to facilitate this note. I attest to having reviewed and edited the generated note for accuracy, though some syntax or spelling errors may persist. Please contact the author of this note for any clarification.

## 2025-02-07 ENCOUNTER — OFFICE VISIT (OUTPATIENT)
Dept: CARDIOLOGY | Facility: CLINIC | Age: OVER 89
End: 2025-02-07
Payer: MEDICARE

## 2025-02-07 ENCOUNTER — OFFICE VISIT (OUTPATIENT)
Dept: SPORTS MEDICINE | Facility: CLINIC | Age: OVER 89
End: 2025-02-07
Payer: MEDICARE

## 2025-02-07 ENCOUNTER — HOSPITAL ENCOUNTER (OUTPATIENT)
Dept: CARDIOLOGY | Facility: HOSPITAL | Age: OVER 89
Discharge: HOME | End: 2025-02-07
Attending: INTERNAL MEDICINE
Payer: MEDICARE

## 2025-02-07 VITALS
RESPIRATION RATE: 16 BRPM | HEART RATE: 73 BPM | HEIGHT: 72 IN | BODY MASS INDEX: 24.59 KG/M2 | SYSTOLIC BLOOD PRESSURE: 102 MMHG | OXYGEN SATURATION: 97 % | DIASTOLIC BLOOD PRESSURE: 60 MMHG | WEIGHT: 181.56 LBS

## 2025-02-07 DIAGNOSIS — I48.0 PAROXYSMAL ATRIAL FIBRILLATION: ICD-10-CM

## 2025-02-07 DIAGNOSIS — M25.562 CHRONIC PAIN OF BOTH KNEES: ICD-10-CM

## 2025-02-07 DIAGNOSIS — I70.0 AORTIC ATHEROSCLEROSIS: ICD-10-CM

## 2025-02-07 DIAGNOSIS — M25.561 CHRONIC PAIN OF BOTH KNEES: ICD-10-CM

## 2025-02-07 DIAGNOSIS — M17.0 PRIMARY OSTEOARTHRITIS OF BOTH KNEES: Primary | ICD-10-CM

## 2025-02-07 DIAGNOSIS — G89.29 CHRONIC PAIN OF BOTH KNEES: ICD-10-CM

## 2025-02-07 DIAGNOSIS — I50.20 SYSTOLIC HEART FAILURE, UNSPECIFIED HF CHRONICITY: Primary | ICD-10-CM

## 2025-02-07 DIAGNOSIS — I10 ESSENTIAL HYPERTENSION: ICD-10-CM

## 2025-02-07 DIAGNOSIS — Z79.01 CHRONIC ANTICOAGULATION: ICD-10-CM

## 2025-02-07 PROCEDURE — 1111F DSCHRG MED/CURRENT MED MERGE: CPT | Mod: HCNC,CPTII,S$GLB, | Performed by: STUDENT IN AN ORGANIZED HEALTH CARE EDUCATION/TRAINING PROGRAM

## 2025-02-07 PROCEDURE — 99204 OFFICE O/P NEW MOD 45 MIN: CPT | Mod: HCNC,S$GLB,, | Performed by: STUDENT IN AN ORGANIZED HEALTH CARE EDUCATION/TRAINING PROGRAM

## 2025-02-07 PROCEDURE — 1159F MED LIST DOCD IN RCRD: CPT | Mod: HCNC,CPTII,S$GLB, | Performed by: INTERNAL MEDICINE

## 2025-02-07 PROCEDURE — G2211 COMPLEX E/M VISIT ADD ON: HCPCS | Mod: HCNC,S$GLB,, | Performed by: STUDENT IN AN ORGANIZED HEALTH CARE EDUCATION/TRAINING PROGRAM

## 2025-02-07 PROCEDURE — 3288F FALL RISK ASSESSMENT DOCD: CPT | Mod: HCNC,CPTII,S$GLB, | Performed by: INTERNAL MEDICINE

## 2025-02-07 PROCEDURE — 93005 ELECTROCARDIOGRAM TRACING: CPT | Mod: HCNC

## 2025-02-07 PROCEDURE — 1125F AMNT PAIN NOTED PAIN PRSNT: CPT | Mod: HCNC,CPTII,S$GLB, | Performed by: STUDENT IN AN ORGANIZED HEALTH CARE EDUCATION/TRAINING PROGRAM

## 2025-02-07 PROCEDURE — 1160F RVW MEDS BY RX/DR IN RCRD: CPT | Mod: HCNC,CPTII,S$GLB, | Performed by: INTERNAL MEDICINE

## 2025-02-07 PROCEDURE — 99999 PR PBB SHADOW E&M-EST. PATIENT-LVL III: CPT | Mod: PBBFAC,HCNC,, | Performed by: STUDENT IN AN ORGANIZED HEALTH CARE EDUCATION/TRAINING PROGRAM

## 2025-02-07 PROCEDURE — 93010 ELECTROCARDIOGRAM REPORT: CPT | Mod: HCNC,,, | Performed by: INTERNAL MEDICINE

## 2025-02-07 PROCEDURE — 1159F MED LIST DOCD IN RCRD: CPT | Mod: HCNC,CPTII,S$GLB, | Performed by: STUDENT IN AN ORGANIZED HEALTH CARE EDUCATION/TRAINING PROGRAM

## 2025-02-07 PROCEDURE — 1111F DSCHRG MED/CURRENT MED MERGE: CPT | Mod: HCNC,CPTII,S$GLB, | Performed by: INTERNAL MEDICINE

## 2025-02-07 PROCEDURE — 1101F PT FALLS ASSESS-DOCD LE1/YR: CPT | Mod: HCNC,CPTII,S$GLB, | Performed by: INTERNAL MEDICINE

## 2025-02-07 PROCEDURE — 99214 OFFICE O/P EST MOD 30 MIN: CPT | Mod: HCNC,S$GLB,, | Performed by: INTERNAL MEDICINE

## 2025-02-07 PROCEDURE — 1101F PT FALLS ASSESS-DOCD LE1/YR: CPT | Mod: HCNC,CPTII,S$GLB, | Performed by: STUDENT IN AN ORGANIZED HEALTH CARE EDUCATION/TRAINING PROGRAM

## 2025-02-07 PROCEDURE — 99999 PR PBB SHADOW E&M-EST. PATIENT-LVL III: CPT | Mod: PBBFAC,HCNC,, | Performed by: INTERNAL MEDICINE

## 2025-02-07 PROCEDURE — 3288F FALL RISK ASSESSMENT DOCD: CPT | Mod: HCNC,CPTII,S$GLB, | Performed by: STUDENT IN AN ORGANIZED HEALTH CARE EDUCATION/TRAINING PROGRAM

## 2025-02-07 NOTE — PATIENT INSTRUCTIONS
Assessment:  Jose Latham is a 89 y.o. male with a chief complaint of Pain of the Left Knee and Pain of the Right Knee    Encounter Diagnoses   Name Primary?    Primary osteoarthritis of both knees Yes    Chronic pain of both knees     Chronic anticoagulation       Plan:  Patient with chronic bilateral knee pain due to underlying degenerative osteoarthritis.  Left as a bit more severe than the right.  X-rays today showing moderate degenerative changes of both knees.  Diagnosis, treatment options, prognosis discussed today.    Recommend conservative management.    Recommend to avoid NSAIDs, such as ibuprofen, Aleve, as you are now taking blood thinner Eliquis.  For pain, you can use Tylenol, 1-2 extra-strength tablets, twice per day as needed.  Would recommend to ice her knee after exercise/dancing/golfing, etc., for 15-20 minutes at a time.  Would recommend to stretch, flexor knee, at least daily to avoid stiffness.  If your knee pain is not improving, we can consider for alternative treatments, such as corticosteroid injections, or more long-acting viscosupplementation gel injections.    Follow-up:  1 month or sooner if there are any problems between now and then.    Thank you for choosing [facility] and Dr. Loco Sue for your orthopedic & sports medicine care. It is our goal to provide you with exceptional care that will help keep you healthy, active, and get you back in the game.    Please do not hesitate to reach out to us via email, phone, or MyChart with any questions, concerns, or feedback.    If you are experiencing pain/discomfort ,or have questions after 5pm and would like to be connected to the [facility] on-call team, please call this number and specify which Sports Medicine provider is treating you: [phone]

## 2025-02-07 NOTE — PROGRESS NOTES
Subjective:   Patient ID:  Jose Latham is a 89 y.o. male who presents for follow-up of No chief complaint on file.  Pt is s/p hospitalization for new onset CHf and new onset afib. Echo- Ef 30-35%  EKG today afib 69 bpm  Congestive Heart Failure  Presents for follow-up visit. Pertinent negatives include no abdominal pain, chest pain, chest pressure, claudication, edema, fatigue, muscle weakness, near-syncope, nocturia, orthopnea, palpitations, paroxysmal nocturnal dyspnea, shortness of breath or unexpected weight change. The symptoms have been stable. Compliance with total regimen is %. Compliance with diet is %. Compliance with exercise is %. Compliance with medications is %.   Atrial Fibrillation  Presents for follow-up visit. Symptoms are negative for chest pain, dizziness, palpitations, shortness of breath, syncope, tachycardia and weakness. The symptoms have been stable. Past medical history includes atrial fibrillation and CHF. There are no medication compliance problems.       Review of Systems   Constitutional: Negative. Negative for fatigue, unexpected weight change and weight gain.   HENT: Negative.     Eyes: Negative.    Cardiovascular: Negative.  Negative for chest pain, claudication, leg swelling, near-syncope, palpitations and syncope.   Respiratory:  Negative for shortness of breath.    Endocrine: Negative.    Hematologic/Lymphatic: Negative.    Skin: Negative.    Musculoskeletal:  Negative for muscle weakness.   Gastrointestinal: Negative.  Negative for abdominal pain.   Genitourinary: Negative.  Negative for nocturia.   Neurological: Negative.  Negative for dizziness and weakness.   Psychiatric/Behavioral: Negative.     Allergic/Immunologic: Negative.    All other systems reviewed and are negative.    Family History   Problem Relation Name Age of Onset    Stroke Sister x3     Hypertension Sister x3     Cancer Sister x3         Stomach    Stomach cancer Sister x3     Heart  disease Sister x3     Aneurysm Sister x3     No Known Problems Mother      Alcohol abuse Father      No Known Problems Maternal Aunt      No Known Problems Maternal Uncle      No Known Problems Paternal Aunt      No Known Problems Paternal Uncle      No Known Problems Maternal Grandmother      No Known Problems Maternal Grandfather      No Known Problems Paternal Grandmother      No Known Problems Paternal Grandfather      No Known Problems Son x2     Amblyopia Neg Hx      Blindness Neg Hx      Cataracts Neg Hx      Diabetes Neg Hx      Glaucoma Neg Hx      Macular degeneration Neg Hx      Retinal detachment Neg Hx      Strabismus Neg Hx      Thyroid disease Neg Hx       Past Medical History:   Diagnosis Date    Essential hypertension     Macular degeneration of both eyes - Both Eyes 2014    Melanoma     left leg, removed years ago    Nuclear sclerosis - Both Eyes 2013     Social History     Socioeconomic History    Marital status:     Number of children: 2   Occupational History    Occupation: Retired - [employer]   Tobacco Use    Smoking status: Former     Current packs/day: 0.00     Average packs/day: 1 pack/day for 15.0 years (15.0 ttl pk-yrs)     Types: Cigarettes     Start date: 5/15/1949     Quit date: 5/15/1964     Years since quittin.7     Passive exposure: Past    Smokeless tobacco: Never   Substance and Sexual Activity    Alcohol use: Yes     Alcohol/week: 2.0 standard drinks of alcohol     Types: 2 Cans of beer per week    Drug use: No    Sexual activity: Not Currently     Partners: Female     Social Drivers of Health     Financial Resource Strain: Low Risk  (2025)    Overall Financial Resource Strain (CARDIA)     Difficulty of Paying Living Expenses: Not hard at all   Food Insecurity: No Food Insecurity (2025)    Hunger Vital Sign     Worried About Running Out of Food in the Last Year: Never true     Ran Out of Food in the Last Year: Never true   Transportation  Needs: No Transportation Needs (1/24/2025)    TRANSPORTATION NEEDS     Transportation : No   Stress: No Stress Concern Present (1/24/2025)    [employer] [place] [employer] - Occupational Stress Questionnaire     Feeling of Stress : Not at all   Housing Stability: Low Risk  (1/24/2025)    Housing Stability Vital Sign     Unable to Pay for Housing in the Last Year: No     Homeless in the Last Year: No     Current Outpatient Medications on File Prior to Visit   Medication Sig Dispense Refill    amiodarone (PACERONE) 200 MG Tab Take 1 tablet (200 mg total) by mouth once daily. 30 tablet 0    apixaban (ELIQUIS) 5 mg Tab Take 1 tablet (5 mg total) by mouth 2 (two) times daily. 60 tablet 0    aspirin 81 MG Chew Take 81 mg by mouth once daily.      furosemide (LASIX) 20 MG tablet Take 1 tablet (20 mg total) by mouth 2 (two) times daily. 60 tablet 0    melatonin 3 mg Tab Take 3 mg by mouth every evening.      metoprolol tartrate (LOPRESSOR) 50 MG tablet Take 1 tablet (50 mg total) by mouth 3 (three) times daily. 90 tablet 0    multivit-min-FA-lycopen-lutein 0.4-300-250 mg-mcg-mcg Tab Take 1 tablet by mouth once daily.       sacubitriL-valsartan (ENTRESTO) 24-26 mg per tablet Take 1 tablet by mouth 2 (two) times daily. 60 tablet 0    spironolactone (ALDACTONE) 25 MG tablet Take 1 tablet (25 mg total) by mouth once daily. 30 tablet 0     No current facility-administered medications on file prior to visit.     Review of patient's allergies indicates:  No Known Allergies    Objective:     Physical Exam  Vitals and nursing note reviewed.   Constitutional:       Appearance: He is well-developed.   HENT:      Head: Normocephalic and atraumatic.   Eyes:      Conjunctiva/sclera: Conjunctivae normal.      Pupils: Pupils are equal, round, and reactive to light.   Cardiovascular:      Rate and Rhythm: Normal rate. Rhythm irregular.      Pulses: Intact distal pulses.      Heart sounds: Normal heart sounds.   Pulmonary:       Effort: Pulmonary effort is normal.      Breath sounds: Normal breath sounds.   Abdominal:      General: Bowel sounds are normal.      Palpations: Abdomen is soft.   Musculoskeletal:      Cervical back: Normal range of motion and neck supple.   Skin:     General: Skin is warm and dry.   Neurological:      Mental Status: He is alert and oriented to person, place, and time.         Assessment:     1. Systolic heart failure, unspecified HF chronicity    2. Essential hypertension    3. Paroxysmal atrial fibrillation    4. Aortic atherosclerosis        Plan:     Systolic heart failure, unspecified HF chronicity    Essential hypertension    Paroxysmal atrial fibrillation  -     SCHEDULED EKG 12-LEAD (to Muse); Future    Aortic atherosclerosis      Continue eliquis, amiodarone, metoprolol- afib  Continue entresto- CMY/CHF/afib

## 2025-02-11 LAB
OHS QRS DURATION: 98 MS
OHS QTC CALCULATION: 430 MS

## 2025-02-19 DIAGNOSIS — I50.21 ACUTE SYSTOLIC HEART FAILURE: Primary | ICD-10-CM

## 2025-02-19 RX ORDER — AMIODARONE HYDROCHLORIDE 200 MG/1
200 TABLET ORAL DAILY
Qty: 90 TABLET | Refills: 3 | Status: SHIPPED | OUTPATIENT
Start: 2025-02-19 | End: 2026-03-21

## 2025-02-19 RX ORDER — SPIRONOLACTONE 25 MG/1
25 TABLET ORAL DAILY
Qty: 90 TABLET | Refills: 3 | Status: SHIPPED | OUTPATIENT
Start: 2025-02-19 | End: 2026-03-21

## 2025-03-14 ENCOUNTER — TELEPHONE (OUTPATIENT)
Dept: CARDIOLOGY | Facility: CLINIC | Age: OVER 89
End: 2025-03-14
Payer: MEDICARE

## 2025-03-14 NOTE — TELEPHONE ENCOUNTER
Followed up with Jose, rescheduled appointment for patient. Patient verbalized understanding of date, time, and location of appointment.

## 2025-03-14 NOTE — TELEPHONE ENCOUNTER
----- Message from Karissa sent at 3/14/2025  3:17 PM CDT -----  Contact: LULU CRAWFORD [[id number]]  .Type:  Patient Returning CallWho Called:LULU CRAWFORD [[id number]]Who Left Message for Patient:Does the patient know what this is regarding?:Would the patient rather a call back or a response via Friendsignianer?  callBe Call Back Number: .[phone] Information

## 2025-03-19 ENCOUNTER — HOSPITAL ENCOUNTER (OUTPATIENT)
Dept: CARDIOLOGY | Facility: HOSPITAL | Age: OVER 89
Discharge: HOME | End: 2025-03-19
Payer: MEDICARE

## 2025-03-19 ENCOUNTER — OFFICE VISIT (OUTPATIENT)
Dept: CARDIOLOGY | Facility: CLINIC | Age: OVER 89
End: 2025-03-19
Payer: MEDICARE

## 2025-03-19 VITALS
HEIGHT: 72 IN | WEIGHT: 179.69 LBS | SYSTOLIC BLOOD PRESSURE: 110 MMHG | DIASTOLIC BLOOD PRESSURE: 70 MMHG | HEART RATE: 74 BPM | OXYGEN SATURATION: 95 % | BODY MASS INDEX: 24.34 KG/M2

## 2025-03-19 DIAGNOSIS — I48.0 PAROXYSMAL ATRIAL FIBRILLATION: Primary | ICD-10-CM

## 2025-03-19 DIAGNOSIS — I10 ESSENTIAL HYPERTENSION: ICD-10-CM

## 2025-03-19 DIAGNOSIS — I70.0 AORTIC ATHEROSCLEROSIS: ICD-10-CM

## 2025-03-19 DIAGNOSIS — I48.0 PAROXYSMAL ATRIAL FIBRILLATION: ICD-10-CM

## 2025-03-19 DIAGNOSIS — I50.21 ACUTE SYSTOLIC HEART FAILURE: ICD-10-CM

## 2025-03-19 PROCEDURE — 99999 PR PBB SHADOW E&M-EST. PATIENT-LVL III: CPT | Mod: PBBFAC,HCNC,, | Performed by: INTERNAL MEDICINE

## 2025-03-19 PROCEDURE — 1160F RVW MEDS BY RX/DR IN RCRD: CPT | Mod: HCNC,CPTII,S$GLB, | Performed by: INTERNAL MEDICINE

## 2025-03-19 PROCEDURE — 99214 OFFICE O/P EST MOD 30 MIN: CPT | Mod: HCNC,S$GLB,, | Performed by: INTERNAL MEDICINE

## 2025-03-19 PROCEDURE — 3288F FALL RISK ASSESSMENT DOCD: CPT | Mod: HCNC,CPTII,S$GLB, | Performed by: INTERNAL MEDICINE

## 2025-03-19 PROCEDURE — 1101F PT FALLS ASSESS-DOCD LE1/YR: CPT | Mod: HCNC,CPTII,S$GLB, | Performed by: INTERNAL MEDICINE

## 2025-03-19 PROCEDURE — 93005 ELECTROCARDIOGRAM TRACING: CPT | Mod: HCNC,PO

## 2025-03-19 PROCEDURE — 93010 ELECTROCARDIOGRAM REPORT: CPT | Mod: HCNC,,, | Performed by: INTERNAL MEDICINE

## 2025-03-19 PROCEDURE — 1159F MED LIST DOCD IN RCRD: CPT | Mod: HCNC,CPTII,S$GLB, | Performed by: INTERNAL MEDICINE

## 2025-03-19 RX ORDER — FUROSEMIDE 20 MG/1
20 TABLET ORAL DAILY
Qty: 90 TABLET | Refills: 3 | Status: SHIPPED | OUTPATIENT
Start: 2025-03-19 | End: 2026-03-14

## 2025-03-19 RX ORDER — SPIRONOLACTONE 25 MG/1
25 TABLET ORAL DAILY
Qty: 90 TABLET | Refills: 3 | Status: SHIPPED | OUTPATIENT
Start: 2025-03-19 | End: 2026-04-18

## 2025-03-19 RX ORDER — SACUBITRIL AND VALSARTAN 24; 26 MG/1; MG/1
1 TABLET, FILM COATED ORAL 2 TIMES DAILY
Qty: 180 TABLET | Refills: 3 | Status: SHIPPED | OUTPATIENT
Start: 2025-03-19

## 2025-03-19 RX ORDER — METOPROLOL TARTRATE 50 MG/1
50 TABLET ORAL 2 TIMES DAILY
Qty: 180 TABLET | Refills: 3 | Status: SHIPPED | OUTPATIENT
Start: 2025-03-19 | End: 2026-03-14

## 2025-03-19 RX ORDER — SACUBITRIL AND VALSARTAN 24; 26 MG/1; MG/1
1 TABLET, FILM COATED ORAL 2 TIMES DAILY
COMMUNITY
End: 2025-03-19 | Stop reason: SDUPTHER

## 2025-03-19 RX ORDER — AMIODARONE HYDROCHLORIDE 200 MG/1
200 TABLET ORAL DAILY
Qty: 90 TABLET | Refills: 3 | Status: SHIPPED | OUTPATIENT
Start: 2025-03-19 | End: 2026-04-18

## 2025-03-19 NOTE — PROGRESS NOTES
Subjective:   Patient ID:  Jose Latham is a 89 y.o. male who presents for follow-up of No chief complaint on file.  Patient denies CP, angina or anginal equivalent.EKG afib with controlled V rate.  Pt dancing 3x week.    Congestive Heart Failure  Presents for follow-up visit. Pertinent negatives include no abdominal pain, chest pain, chest pressure, claudication, edema, fatigue, muscle weakness, near-syncope, nocturia, orthopnea, palpitations, paroxysmal nocturnal dyspnea, shortness of breath or unexpected weight change. The symptoms have been stable. Compliance with total regimen is %. Compliance with diet is %. Compliance with exercise is %. Compliance with medications is %.   Atrial Fibrillation  Presents for follow-up visit. Symptoms are negative for chest pain, dizziness, palpitations, shortness of breath, syncope, tachycardia and weakness. The symptoms have been stable. There are no medication compliance problems.       Review of Systems   Constitutional: Negative. Negative for fatigue, unexpected weight change and weight gain.   HENT: Negative.     Eyes: Negative.    Cardiovascular: Negative.  Negative for chest pain, claudication, leg swelling, near-syncope, palpitations and syncope.   Respiratory:  Negative for shortness of breath.    Endocrine: Negative.    Hematologic/Lymphatic: Negative.    Skin: Negative.    Musculoskeletal:  Negative for muscle weakness.   Gastrointestinal: Negative.  Negative for abdominal pain.   Genitourinary: Negative.  Negative for nocturia.   Neurological: Negative.  Negative for dizziness and weakness.   Psychiatric/Behavioral: Negative.     Allergic/Immunologic: Negative.    All other systems reviewed and are negative.    Family History   Problem Relation Name Age of Onset    Stroke Sister x3     Hypertension Sister x3     Cancer Sister x3         Stomach    Stomach cancer Sister x3     Heart disease Sister x3     Aneurysm Sister x3     No Known  Problems Mother      Alcohol abuse Father      No Known Problems Maternal Aunt      No Known Problems Maternal Uncle      No Known Problems Paternal Aunt      No Known Problems Paternal Uncle      No Known Problems Maternal Grandmother      No Known Problems Maternal Grandfather      No Known Problems Paternal Grandmother      No Known Problems Paternal Grandfather      No Known Problems Son x2     Amblyopia Neg Hx      Blindness Neg Hx      Cataracts Neg Hx      Diabetes Neg Hx      Glaucoma Neg Hx      Macular degeneration Neg Hx      Retinal detachment Neg Hx      Strabismus Neg Hx      Thyroid disease Neg Hx       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   Constitutional:       Appearance: He is well-developed.   HENT:      Head: Normocephalic and atraumatic.   Eyes:      Conjunctiva/sclera: Conjunctivae normal.      Pupils: Pupils are equal, round, and reactive to light.   Cardiovascular:      Rate and Rhythm: Normal rate and regular rhythm.      Pulses: Intact distal pulses.      Heart sounds: Normal heart sounds.   Pulmonary:      Effort: Pulmonary effort is normal.      Breath sounds: Normal breath sounds.   Abdominal:      General: Bowel sounds are normal.      Palpations: Abdomen is soft.   Musculoskeletal:      Cervical back: Normal range of motion and neck supple.   Skin:     General: Skin is warm and dry.   Neurological:      Mental Status: He is alert and oriented to person, place, and time.         Assessment:     1. Paroxysmal atrial fibrillation    2. HTN  3. CMY  Plan:     Paroxysmal atrial fibrillation  -     SCHEDULED EKG 12-LEAD (to  [facility]); Future      Continue eliquis, amiodarone, metoprolol- afib  Continue entresto- CMY/CHF/afib            [1]   Social History  Socioeconomic History    Marital status:     Number of children: 2   Occupational History    Occupation: Retired - [employer]   Tobacco Use    Smoking status: Former     Current packs/day: 0.00     Average packs/day: 1 pack/day for 15.0 years (15.0 ttl pk-yrs)     Types: Cigarettes     Start date: 5/15/1949     Quit date: 5/15/1964     Years since quittin.8     Passive exposure: Past    Smokeless tobacco: Never   Substance and Sexual Activity    Alcohol use: Yes     Alcohol/week: 2.0 standard drinks of alcohol     Types: 2 Cans of beer per week    Drug use: No    Sexual activity: Not Currently     Partners: Female     Social Drivers of Health     Financial Resource Strain: Low Risk  (2025)    Overall Financial Resource Strain (CARDIA)     Difficulty of Paying Living Expenses: Not hard at all   Food Insecurity: No Food Insecurity (2025)    Hunger Vital Sign     Worried About Running Out of Food in the Last Year: Never true     Ran Out of Food in the Last Year: Never true   Transportation Needs: No Transportation Needs (2025)    TRANSPORTATION NEEDS     Transportation : No   Stress: No Stress Concern Present (2025)    [employer] - Occupational Stress Questionnaire     Feeling of Stress : Not at all   Housing Stability: Unknown (2025)    Housing Stability Vital Sign     Unable to Pay for Housing in the Last Year: No     Homeless in the Last Year: No   [2]   Current Outpatient Medications on File Prior to Visit   Medication Sig Dispense Refill    amiodarone (PACERONE) 200 MG Tab Take 1 tablet (200 mg total) by mouth once daily. 90 tablet 3    aspirin 81 MG Chew Take 81 mg by mouth once daily.      melatonin 3 mg Tab Take 3 mg by mouth every evening.      multivit-min-FA-lycopen-lutein 0.4-300-250 mg-mcg-mcg Tab Take  1 tablet by mouth once daily.       spironolactone (ALDACTONE) 25 MG tablet Take 1 tablet (25 mg total) by mouth once daily. 90 tablet 3    furosemide (LASIX) 20 MG tablet Take 1 tablet (20 mg total) by mouth 2 (two) times daily. 60 tablet 0    metoprolol tartrate (LOPRESSOR) 50 MG tablet Take 1 tablet (50 mg total) by mouth 3 (three) times daily. 90 tablet 0     No current facility-administered medications on file prior to visit.

## 2025-03-20 LAB
OHS QRS DURATION: 100 MS
OHS QTC CALCULATION: 452 MS

## 2025-03-21 ENCOUNTER — RESULTS FOLLOW-UP (OUTPATIENT)
Dept: CARDIOLOGY | Facility: CLINIC | Age: OVER 89
End: 2025-03-21

## 2025-04-04 ENCOUNTER — OFFICE VISIT (OUTPATIENT)
Dept: INTERNAL MEDICINE | Facility: CLINIC | Age: OVER 89
End: 2025-04-04
Payer: MEDICARE

## 2025-04-04 VITALS
SYSTOLIC BLOOD PRESSURE: 98 MMHG | OXYGEN SATURATION: 100 % | HEART RATE: 81 BPM | DIASTOLIC BLOOD PRESSURE: 66 MMHG | WEIGHT: 180.13 LBS | TEMPERATURE: 98 F | BODY MASS INDEX: 24.43 KG/M2

## 2025-04-04 DIAGNOSIS — I48.19 PERSISTENT ATRIAL FIBRILLATION: ICD-10-CM

## 2025-04-04 DIAGNOSIS — J06.9 VIRAL URI WITH COUGH: Primary | ICD-10-CM

## 2025-04-04 DIAGNOSIS — I10 ESSENTIAL HYPERTENSION: ICD-10-CM

## 2025-04-04 LAB
CTP QC/QA: YES
CTP QC/QA: YES
POC MOLECULAR INFLUENZA A AGN: NEGATIVE
POC MOLECULAR INFLUENZA B AGN: NEGATIVE
SARS-COV-2 RDRP RESP QL NAA+PROBE: NEGATIVE

## 2025-04-04 PROCEDURE — 99999 PR PBB SHADOW E&M-EST. PATIENT-LVL III: CPT | Mod: PBBFAC,HCNC,, | Performed by: NURSE PRACTITIONER

## 2025-04-04 NOTE — PROGRESS NOTES
Subjective:       Patient ID: Jose Latham is a 89 y.o. male.  CHIEF COMPLAINT:  - Mr. Latham presents for a follow-up visit with Dr. Calle regarding his heart condition and reports symptoms of a possible cold.    HPI:  Mr. Latham reports developing symptoms of what he believes to be a cold, which started on Wednesday. He describes a sore throat, coughing, chest congestion, and nasal drip. He mentions potential exposure to illness, stating that many people in his Spiritism group and bible classes have recently had colds, flu, or COVID-19. He reports feeling relatively well overall and that the cold is not significantly impacting his daily activities. He continues to attend dance classes and remain active. He expresses uncertainty about whether the symptoms will resolve on their own. He also mentions nasal drip since starting new medications, noting this is a new symptom for him. [place] and acknowledges it is currently pollen season, which may be contributing to his symptoms. Mr. Latham states that the cold symptoms are not preventing normal activities.     In regards to his a fib, he follows closely with Dr. Clarke. Overall feels as if he has been doing well. Taking meds as prescribed and having no issues       BP 98/66   Pulse 81   Temp 97.7 °F (36.5 °C)   Wt 81.7 kg (180 lb 1.9 oz)   SpO2 100%   BMI 24.43 kg/m²     Review of Systems   Constitutional:  Positive for fatigue. Negative for fever and unexpected weight change.   HENT:  Negative for hearing loss, postnasal drip and rhinorrhea.    Eyes:  Negative for pain and visual disturbance.   Respiratory:  Negative for cough, shortness of breath and wheezing.    Cardiovascular:  Negative for chest pain and palpitations.   Gastrointestinal:  Negative for constipation, diarrhea, nausea and vomiting.   Genitourinary:  Negative for dysuria and hematuria.   Musculoskeletal:  Negative for arthralgias, back pain, myalgias and neck stiffness.    Skin:  Negative for pallor and rash.   Neurological:  Negative for seizures, syncope and headaches.   Hematological:  Negative for adenopathy.   Psychiatric/Behavioral:  Negative for dysphoric mood. The patient is not nervous/anxious.        Objective:      Physical Exam  Vitals and nursing note reviewed.   Constitutional:       General: He is not in acute distress.     Appearance: He is well-developed. He is not diaphoretic.   HENT:      Head: Normocephalic and atraumatic.   Cardiovascular:      Rate and Rhythm: Normal rate. Rhythm irregular.      Heart sounds: Normal heart sounds.   Pulmonary:      Effort: Pulmonary effort is normal. No respiratory distress.      Breath sounds: Normal breath sounds.   Skin:     General: Skin is warm and dry.      Findings: No rash.   Neurological:      Mental Status: He is alert and oriented to person, place, and time.   Psychiatric:         Behavior: Behavior normal.         Thought Content: Thought content normal.         Judgment: Judgment normal.         Assessment and Plan        Jose was seen today for follow-up.    Diagnoses and all orders for this visit:    Viral URI with cough  -     POCT COVID-19 Rapid Screening  -     POCT Influenza A/B Molecular    Persistent atrial fibrillation    Essential hypertension    BMI 24.0-24.9, adult      There are no Patient Instructions on file for this visit.      1. Viral URI with cough    2. Persistent atrial fibrillation    3. Essential hypertension    4. BMI 24.0-24.9, adult        Assessment & Plan    ATRIAL FIBRILLATION  - stable. Continue meds as prescribed  - continue to follow with DR. Clarke    HYPERTENSION:  - chronic, stable. Doing well     COMMON COLD:  - Assessed reported cold symptoms including pharyngitis, cough, chest congestion, and rhinorrhea, which started on Wednesday.  - Observed nasal discharge during physical exam.  - Advised the patient to rest to recover from cold symptoms.  - negative for covid/flu.   -  supportive care discussed    FOLLOW UP:  - 4 months with Dr. Calle and PAUL    This note was generated with the assistance of ambient listening technology. Verbal consent was obtained by the patient and accompanying visitor(s) for the recording of patient appointment to facilitate this note. I attest to having reviewed and edited the generated note for accuracy, though some syntax or spelling errors may persist. Please contact the author of this note for any clarification.

## 2025-04-08 ENCOUNTER — OFFICE VISIT (OUTPATIENT)
Dept: URGENT CARE | Facility: CLINIC | Age: OVER 89
End: 2025-04-08
Payer: MEDICARE

## 2025-04-08 VITALS
DIASTOLIC BLOOD PRESSURE: 57 MMHG | WEIGHT: 177.25 LBS | BODY MASS INDEX: 24.01 KG/M2 | TEMPERATURE: 98 F | OXYGEN SATURATION: 95 % | SYSTOLIC BLOOD PRESSURE: 114 MMHG | HEART RATE: 75 BPM | HEIGHT: 72 IN | RESPIRATION RATE: 16 BRPM

## 2025-04-08 DIAGNOSIS — J06.9 VIRAL URI: Primary | ICD-10-CM

## 2025-04-08 PROCEDURE — 99213 OFFICE O/P EST LOW 20 MIN: CPT | Mod: S$GLB,,, | Performed by: NURSE PRACTITIONER

## 2025-04-08 RX ORDER — CETIRIZINE HYDROCHLORIDE 10 MG/1
10 TABLET ORAL NIGHTLY
Qty: 30 TABLET | Refills: 0 | Status: SHIPPED | OUTPATIENT
Start: 2025-04-08

## 2025-04-08 NOTE — PATIENT INSTRUCTIONS
You have been diagnosed with a viral syndrome. Viral syndromes are self-limited and usually resolve within 10 days from onset of symptoms. Antibiotics are not effective against viral infections. It is important that you get lots of rest and drink plenty of fluids. Treatment is through symptomatic relief.    Below are suggestions for symptomatic relief:   -Tylenol every 4 hours OR ibuprofen every 6 hours as needed for pain/fever.   -Salt water gargles to soothe throat pain.   -Chloroseptic spray also helps to numb throat pain.   -Nasal saline spray reduces inflammation and dryness.   -Warm face compresses to help with facial sinus pain/pressure.   -Vicks vapor rub at night.   -Flonase OTC or Nasacort OTC for nasal congestion.   -Simple foods like chicken noodle soup.   -Delsym helps with coughing at night   -Zyrtec/Claritin during the day & Benadryl at night may help with allergies.     If you DO NOT have Hypertension or any history of palpitations, it is ok to take over the counter Sudafed or Mucinex D or Allegra-D or Claritin-D or Zyrtec-D.  If you do take one of the above, it is ok to combine that with plain over the counter Mucinex or Allegra or Claritin or Zyrtec. If, for example, you are taking Zyrtec -D, you can combine that with Mucinex, but not Mucinex-D.  If you are taking Mucinex-D, you can combine that with plain Allegra or Claritin or Zyrtec.   If you DO have Hypertension or palpitations, it is safe to take Coricidin HBP for relief of sinus symptoms.    Please return to clinic if symptoms have not subsided 10-14 days from onset, as your viral infection may have developed into a bacterial infection. It is at that time antibiotics would be indicated.     If you were prescribed a narcotic or controlled medication, do not drive or operate heavy equipment or machinery while taking these medications.  You must understand that you've received an Urgent Care treatment only and that you may be released before all  your medical problems are known or treated. You, the patient, will arrange for follow up care as instructed.  Follow up with your PCP or specialty clinic as directed within 2-5 days if not improved or as needed.  You can call [phone] to schedule an appointment with the appropriate provider.  If your condition worsens we recommend that you receive another evaluation at the emergency room immediately or contact your primary medical clinics after hours call service to discuss your concerns.  Please return here or go to the Emergency Department for any concerns or worsening of condition.

## 2025-04-08 NOTE — PROGRESS NOTES
Subjective:      Patient ID: Jose Latham is a 89 y.o. male.    Vitals:  height is 6' (1.829 m) and weight is 80.4 kg (177 lb 4 oz). His oral temperature is 98.2 °F (36.8 °C). His blood pressure is 114/57 (abnormal) and his pulse is 75. His respiration is 16 and oxygen saturation is 95%.     Chief Complaint: No chief complaint on file.    89 yr old male presents to the Urgent Care with complaint or congestion and runny nose x 1 week. Patient was seen by PCP last week and tested negative for flu and covid. Patient was also prescribed Coricidin for symptoms. Patient denies any CP, SOB, abdominal pain or fever.       Cough  This is a new problem. The current episode started in the past 7 days. The problem has been gradually worsening. The problem occurs every few minutes. The cough is Productive of sputum. Associated symptoms include nasal congestion, postnasal drip and rhinorrhea. Pertinent negatives include no chest pain, chills, ear congestion, ear pain, fever, headaches, heartburn, hemoptysis, myalgias, rash, sore throat, shortness of breath, sweats, weight loss or wheezing. Nothing aggravates the symptoms. He has tried OTC cough suppressant for the symptoms. The treatment provided no relief. There is no history of asthma, bronchiectasis, bronchitis, COPD, emphysema, environmental allergies or pneumonia.       Constitution: Negative for chills and fever.   HENT:  Positive for congestion and postnasal drip. Negative for ear pain and sore throat.    Cardiovascular:  Negative for chest pain and sob on exertion.   Respiratory:  Positive for cough. Negative for sputum production, bloody sputum, shortness of breath and wheezing.    Gastrointestinal:  Negative for heartburn.   Musculoskeletal:  Negative for muscle ache.   Skin:  Negative for rash.   Allergic/Immunologic: Negative for environmental allergies.   Neurological:  Negative for headaches.      Objective:     Physical Exam   Constitutional: He is oriented to  person, place, and time. He appears well-developed. He is cooperative.  Non-toxic appearance. He does not appear ill. No distress.   HENT:   Head: Normocephalic and atraumatic.   Ears:   Right Ear: Hearing, tympanic membrane, external ear and ear canal normal.   Left Ear: Hearing, tympanic membrane, external ear and ear canal normal.   Nose: Rhinorrhea present. No mucosal edema or nasal deformity. No epistaxis. Right sinus exhibits no maxillary sinus tenderness and no frontal sinus tenderness. Left sinus exhibits no maxillary sinus tenderness and no frontal sinus tenderness.   Mouth/Throat: Uvula is midline, oropharynx is clear and moist and mucous membranes are normal. No trismus in the jaw. Normal dentition. No uvula swelling. No oropharyngeal exudate, posterior oropharyngeal edema, posterior oropharyngeal erythema, tonsillar abscesses or cobblestoning. No tonsillar exudate.   Eyes: EOM are normal. Pupils are equal, round, and reactive to light.   Neck: Neck supple. No edema present. No erythema present. No neck rigidity present.   Cardiovascular: Normal rate, regular rhythm and normal heart sounds.   Pulmonary/Chest: Effort normal and breath sounds normal. No accessory muscle usage or stridor. No respiratory distress. He has no decreased breath sounds. He has no wheezes. He has no rhonchi. He has no rales.   Musculoskeletal: Normal range of motion.         General: No deformity or edema. Normal range of motion.   Lymphadenopathy:        Head (right side): No tonsillar, no preauricular and no posterior auricular adenopathy present.        Head (left side): No tonsillar, no preauricular and no posterior auricular adenopathy present.   Neurological: He is alert and oriented to person, place, and time. He exhibits normal muscle tone. Coordination and gait normal.   Skin: Skin is warm, dry, intact, not diaphoretic and not pale. Capillary refill takes less than 2 seconds.   Psychiatric: His speech is normal and  behavior is normal. Judgment and thought content normal.   Nursing note and vitals reviewed.      Assessment:     1. Viral URI      Plan:   Patient is non-toxic appearing and in no acute distress. No focal lung findings, hypoxia, or prolonged period of symptoms to warrant CXR at this time as PNA is highly unlikely. No wheezing or respiratory distress to suggest acute asthma exacerbation. Negative Centor criteria in the presence of typical viral URI symptoms makes acute bacterial pharyngitis/tonsilitis unlikely. No sinus TTP or purulent rhinorrhea to suggest acute bacterial rhinosinusitis at this time. No evidence of AOM.       Discharged home with supportive care. I discussed the use of OTC medications for symptom control. I advised patient to maintain adequate hydration and advance diet as tolerated to maintain adequate nutrition. Work note issued at patient's request.    I discussed with the patient the diagnosis, treatment plan, indications for the emergency department, and for expected follow-up. The patient verbalized an understanding. The patient is asked if there are any questions or concerns. We discuss the case, until all issues are addressed to the patient's satisfaction. Patient understands and is agreeable to the plan.     Viral URI  -     cetirizine (ZYRTEC) 10 MG tablet; Take 1 tablet (10 mg total) by mouth every evening.  Dispense: 30 tablet; Refill: 0

## 2025-04-09 ENCOUNTER — TELEPHONE (OUTPATIENT)
Dept: CARDIOLOGY | Facility: CLINIC | Age: OVER 89
End: 2025-04-09
Payer: MEDICARE

## 2025-04-14 ENCOUNTER — PATIENT MESSAGE (OUTPATIENT)
Dept: CARDIOLOGY | Facility: CLINIC | Age: OVER 89
End: 2025-04-14
Payer: MEDICARE

## 2025-04-15 ENCOUNTER — OFFICE VISIT (OUTPATIENT)
Dept: URGENT CARE | Facility: CLINIC | Age: OVER 89
End: 2025-04-15
Payer: MEDICARE

## 2025-04-15 VITALS
HEART RATE: 91 BPM | RESPIRATION RATE: 20 BRPM | TEMPERATURE: 99 F | OXYGEN SATURATION: 95 % | BODY MASS INDEX: 24.01 KG/M2 | WEIGHT: 177.25 LBS | DIASTOLIC BLOOD PRESSURE: 59 MMHG | HEIGHT: 72 IN | SYSTOLIC BLOOD PRESSURE: 102 MMHG

## 2025-04-15 DIAGNOSIS — J32.9 SINUSITIS, UNSPECIFIED CHRONICITY, UNSPECIFIED LOCATION: Primary | ICD-10-CM

## 2025-04-15 DIAGNOSIS — R05.9 COUGH, UNSPECIFIED TYPE: ICD-10-CM

## 2025-04-15 PROCEDURE — 99213 OFFICE O/P EST LOW 20 MIN: CPT | Mod: S$GLB,,, | Performed by: NURSE PRACTITIONER

## 2025-04-15 RX ORDER — GUAIFENESIN 600 MG/1
1200 TABLET, EXTENDED RELEASE ORAL 2 TIMES DAILY
Qty: 28 TABLET | Refills: 0 | Status: SHIPPED | OUTPATIENT
Start: 2025-04-15 | End: 2025-04-22

## 2025-04-15 RX ORDER — AMOXICILLIN AND CLAVULANATE POTASSIUM 875; 125 MG/1; MG/1
1 TABLET, FILM COATED ORAL 2 TIMES DAILY
Qty: 14 TABLET | Refills: 0 | Status: SHIPPED | OUTPATIENT
Start: 2025-04-15 | End: 2025-04-22

## 2025-04-15 RX ORDER — BENZONATATE 200 MG/1
200 CAPSULE ORAL 3 TIMES DAILY PRN
Qty: 30 CAPSULE | Refills: 0 | Status: SHIPPED | OUTPATIENT
Start: 2025-04-15 | End: 2025-04-25

## 2025-04-15 NOTE — PROGRESS NOTES
Subjective:      Patient ID: Jose Latham is a 89 y.o. male.    Vitals:  height is 6' (1.829 m) and weight is 80.4 kg (177 lb 4 oz). His tympanic temperature is 98.6 °F (37 °C). His blood pressure is 102/59 (abnormal) and his pulse is 91. His respiration is 20 and oxygen saturation is 95%.     Chief Complaint: Nasal Congestion    Pt presents with congestion, cough, and sore throat. Symptoms started on 03/25. Pt has not been taking any meds. Pt states he came in on 04/08 and was prescribed meds but has not gotten better.     Sinus Problem  This is a new problem. Episode onset: 3 weeks ago. The problem is unchanged. There has been no fever. His pain is at a severity of 0/10. He is experiencing no pain. Associated symptoms include congestion, coughing, sinus pressure and a sore throat. Pertinent negatives include no chills, diaphoresis, ear pain, headaches, hoarse voice, neck pain, shortness of breath, sneezing or swollen glands. The treatment provided no relief.       Constitution: Negative for chills and sweating.   HENT:  Positive for congestion, sinus pressure and sore throat. Negative for ear pain.    Neck: Negative for neck pain.   Respiratory:  Positive for cough. Negative for shortness of breath.    Allergic/Immunologic: Negative for sneezing.   Neurological:  Negative for headaches.      Objective:     Physical Exam   Constitutional: He is oriented to person, place, and time. He appears well-developed. He is cooperative.  Non-toxic appearance. He does not appear ill. No distress.   HENT:   Head: Normocephalic and atraumatic.   Ears:   Right Ear: Hearing, tympanic membrane, external ear and ear canal normal.   Left Ear: Hearing, tympanic membrane, external ear and ear canal normal.   Nose: Rhinorrhea present. No mucosal edema or nasal deformity. No epistaxis. Right sinus exhibits no maxillary sinus tenderness and no frontal sinus tenderness. Left sinus exhibits no maxillary sinus tenderness and no frontal  sinus tenderness.   Mouth/Throat: Uvula is midline, oropharynx is clear and moist and mucous membranes are normal. No trismus in the jaw. Normal dentition. No uvula swelling. No oropharyngeal exudate, posterior oropharyngeal edema or posterior oropharyngeal erythema.   Eyes: Conjunctivae and lids are normal. No scleral icterus.   Neck: Trachea normal and phonation normal. Neck supple. No edema present. No erythema present. No neck rigidity present.   Cardiovascular: Normal rate, regular rhythm, normal heart sounds and normal pulses.   Pulmonary/Chest: Effort normal and breath sounds normal. No respiratory distress. He has no decreased breath sounds. He has no rhonchi.         Comments: Moist cough      Abdominal: Normal appearance.   Musculoskeletal: Normal range of motion.         General: No deformity. Normal range of motion.   Neurological: He is alert and oriented to person, place, and time. He exhibits normal muscle tone. Coordination normal.   Skin: Skin is warm, dry, intact, not diaphoretic and not pale.   Psychiatric: His speech is normal and behavior is normal. Judgment and thought content normal.   Nursing note and vitals reviewed.      Assessment:     1. Sinusitis, unspecified chronicity, unspecified location    2. Cough, unspecified type        Plan:       Sinusitis, unspecified chronicity, unspecified location  -     amoxicillin-clavulanate 875-125mg (AUGMENTIN) 875-125 mg per tablet; Take 1 tablet by mouth 2 (two) times daily. for 7 days  Dispense: 14 tablet; Refill: 0    Cough, unspecified type  -     benzonatate (TESSALON) 200 MG capsule; Take 1 capsule (200 mg total) by mouth 3 (three) times daily as needed for Cough.  Dispense: 30 capsule; Refill: 0  -     guaiFENesin (MUCINEX) 600 mg 12 hr tablet; Take 2 tablets (1,200 mg total) by mouth 2 (two) times daily. for 7 days  Dispense: 28 tablet; Refill: 0      Not improving after 3-4 weeks of supportive care  Continue zyrtec  Supportive care  discussed  Follow up with Primary Care Physician if not improving, fever, or worsening symptoms

## 2025-05-09 ENCOUNTER — OFFICE VISIT (OUTPATIENT)
Dept: OPHTHALMOLOGY | Facility: CLINIC | Age: OVER 89
End: 2025-05-09
Payer: MEDICARE

## 2025-05-09 DIAGNOSIS — Z96.1 PSEUDOPHAKIA OF BOTH EYES: ICD-10-CM

## 2025-05-09 DIAGNOSIS — H52.13 MYOPIA WITH ASTIGMATISM AND PRESBYOPIA, BILATERAL: ICD-10-CM

## 2025-05-09 DIAGNOSIS — H35.3222 EXUDATIVE AGE-RELATED MACULAR DEGENERATION OF LEFT EYE WITH INACTIVE CHOROIDAL NEOVASCULARIZATION: ICD-10-CM

## 2025-05-09 DIAGNOSIS — H35.3112 NONEXUDATIVE AGE-RELATED MACULAR DEGENERATION, RIGHT EYE, INTERMEDIATE DRY STAGE: Primary | ICD-10-CM

## 2025-05-09 DIAGNOSIS — H52.203 MYOPIA WITH ASTIGMATISM AND PRESBYOPIA, BILATERAL: ICD-10-CM

## 2025-05-09 DIAGNOSIS — H52.4 MYOPIA WITH ASTIGMATISM AND PRESBYOPIA, BILATERAL: ICD-10-CM

## 2025-05-09 PROCEDURE — 1159F MED LIST DOCD IN RCRD: CPT | Mod: CPTII,HCNC,S$GLB, | Performed by: OPTOMETRIST

## 2025-05-09 PROCEDURE — 1160F RVW MEDS BY RX/DR IN RCRD: CPT | Mod: CPTII,HCNC,S$GLB, | Performed by: OPTOMETRIST

## 2025-05-09 PROCEDURE — 99214 OFFICE O/P EST MOD 30 MIN: CPT | Mod: HCNC,S$GLB,, | Performed by: OPTOMETRIST

## 2025-05-09 PROCEDURE — 99999 PR PBB SHADOW E&M-EST. PATIENT-LVL II: CPT | Mod: PBBFAC,HCNC,, | Performed by: OPTOMETRIST

## 2025-05-12 NOTE — PROGRESS NOTES
"HPI     Annual Exam            Comments: Pt states that his near vision has been really blurry and he   would like to be refitted for PAL. Pt states he has begun to see "waves"   in his right eye that weren't intilaly there but noticed it when he was at   [employer]. Pt states that the waves were only in his left up in till now.   Pt denies any eye pain and or irration.             Comments    1. Wet AMD OS  S/p Avastin OS x3 4/29/21  2. Dry AMD OD  AREDS.AG  3. PVD OU  4. PCIOL OU  5. OPS OS  S/P yag cap OD 5/20/21             Last edited by Rika Knight on 5/9/2025 10:12 AM.            Assessment /Plan     For exam results, see Encounter Report.    1. Nonexudative age-related macular degeneration, right eye, intermediate dry stage  Lifestyle modifications reviewed. Observe per Dr Lane.     2. Exudative age-related macular degeneration of left eye with inactive choroidal neovascularization  Continue per Dr Lane    3. Pseudophakia of both eyes  Myopia with astigmatism and presbyopia, bilateral  Eyeglass Final Rx       Eyeglass Final Rx         Sphere Cylinder Axis Add    Right -0.75 +2.00 009 +2.75    Left -2.00 +2.50 180 +2.75      Type: PAL    Expiration Date: 5/9/2026   PD 66                   RTC with Dr Lane as scheduled for annual exams                    "

## 2025-06-16 ENCOUNTER — HOSPITAL ENCOUNTER (OUTPATIENT)
Dept: CARDIOLOGY | Facility: HOSPITAL | Age: OVER 89
Discharge: HOME | End: 2025-06-16
Attending: INTERNAL MEDICINE
Payer: MEDICARE

## 2025-06-16 VITALS
DIASTOLIC BLOOD PRESSURE: 70 MMHG | HEIGHT: 72 IN | BODY MASS INDEX: 23.98 KG/M2 | SYSTOLIC BLOOD PRESSURE: 110 MMHG | WEIGHT: 177 LBS

## 2025-06-16 DIAGNOSIS — I48.0 PAROXYSMAL ATRIAL FIBRILLATION: ICD-10-CM

## 2025-06-16 DIAGNOSIS — I50.21 ACUTE SYSTOLIC HEART FAILURE: ICD-10-CM

## 2025-06-16 LAB
BSA FOR ECHO PROCEDURE: 2.02 M2
EJECTION FRACTION: 60 %
LA WIDTH: 4.8 CM
RA PRESSURE ESTIMATED: 3 MMHG

## 2025-06-16 PROCEDURE — 93306 TTE W/DOPPLER COMPLETE: CPT | Mod: 26,HCNC,, | Performed by: INTERNAL MEDICINE

## 2025-06-16 PROCEDURE — 93306 TTE W/DOPPLER COMPLETE: CPT | Mod: HCNC,PO

## 2025-06-17 ENCOUNTER — RESULTS FOLLOW-UP (OUTPATIENT)
Dept: CARDIOLOGY | Facility: CLINIC | Age: OVER 89
End: 2025-06-17

## 2025-06-25 ENCOUNTER — HOSPITAL ENCOUNTER (OUTPATIENT)
Dept: CARDIOLOGY | Facility: HOSPITAL | Age: OVER 89
Discharge: HOME | End: 2025-06-25
Attending: INTERNAL MEDICINE
Payer: MEDICARE

## 2025-06-25 ENCOUNTER — OFFICE VISIT (OUTPATIENT)
Dept: CARDIOLOGY | Facility: CLINIC | Age: OVER 89
End: 2025-06-25
Payer: MEDICARE

## 2025-06-25 VITALS
HEIGHT: 72 IN | OXYGEN SATURATION: 99 % | WEIGHT: 178.81 LBS | DIASTOLIC BLOOD PRESSURE: 72 MMHG | SYSTOLIC BLOOD PRESSURE: 124 MMHG | BODY MASS INDEX: 24.22 KG/M2 | HEART RATE: 50 BPM

## 2025-06-25 DIAGNOSIS — I10 ESSENTIAL HYPERTENSION: ICD-10-CM

## 2025-06-25 DIAGNOSIS — I48.0 PAROXYSMAL ATRIAL FIBRILLATION: ICD-10-CM

## 2025-06-25 DIAGNOSIS — I70.0 AORTIC ATHEROSCLEROSIS: ICD-10-CM

## 2025-06-25 DIAGNOSIS — I50.22 CHRONIC SYSTOLIC HEART FAILURE: Primary | ICD-10-CM

## 2025-06-25 PROCEDURE — 3288F FALL RISK ASSESSMENT DOCD: CPT | Mod: CPTII,HCNC,S$GLB, | Performed by: INTERNAL MEDICINE

## 2025-06-25 PROCEDURE — 99214 OFFICE O/P EST MOD 30 MIN: CPT | Mod: HCNC,S$GLB,, | Performed by: INTERNAL MEDICINE

## 2025-06-25 PROCEDURE — 93010 ELECTROCARDIOGRAM REPORT: CPT | Mod: HCNC,,, | Performed by: INTERNAL MEDICINE

## 2025-06-25 PROCEDURE — 93005 ELECTROCARDIOGRAM TRACING: CPT | Mod: HCNC,PO

## 2025-06-25 PROCEDURE — 99999 PR PBB SHADOW E&M-EST. PATIENT-LVL III: CPT | Mod: PBBFAC,HCNC,, | Performed by: INTERNAL MEDICINE

## 2025-06-25 PROCEDURE — 1101F PT FALLS ASSESS-DOCD LE1/YR: CPT | Mod: CPTII,HCNC,S$GLB, | Performed by: INTERNAL MEDICINE

## 2025-06-25 PROCEDURE — 1159F MED LIST DOCD IN RCRD: CPT | Mod: CPTII,HCNC,S$GLB, | Performed by: INTERNAL MEDICINE

## 2025-06-25 PROCEDURE — 1126F AMNT PAIN NOTED NONE PRSNT: CPT | Mod: CPTII,HCNC,S$GLB, | Performed by: INTERNAL MEDICINE

## 2025-06-25 NOTE — PROGRESS NOTES
Subjective:   Patient ID:  Jose Latham is a 89 y.o. male who presents for follow-up of No chief complaint on file.  3-25  Patient denies CP, angina or anginal equivalent.EKG afib with controlled V rate.  Pt dancing 3x week.    Today 6-25  Echo nml EF   Patient denies CP, angina or anginal equivalent.  Pt still active  EKG sinus bradycardia  Congestive Heart Failure  Presents for follow-up visit. Pertinent negatives include no abdominal pain, chest pain, chest pressure, claudication, edema, fatigue, muscle weakness, near-syncope, nocturia, orthopnea, palpitations, paroxysmal nocturnal dyspnea, shortness of breath or unexpected weight change. The symptoms have been stable. Compliance with total regimen is %. Compliance with diet is %. Compliance with exercise is %. Compliance with medications is %.   Atrial Fibrillation  Presents for follow-up visit. Symptoms are negative for chest pain, dizziness, palpitations, shortness of breath, syncope, tachycardia and weakness. The symptoms have been stable. There are no medication compliance problems.       Review of Systems   Constitutional: Negative. Negative for fatigue, unexpected weight change and weight gain.   HENT: Negative.     Eyes: Negative.    Cardiovascular: Negative.  Negative for chest pain, claudication, leg swelling, near-syncope, palpitations and syncope.   Respiratory:  Negative for shortness of breath.    Endocrine: Negative.    Hematologic/Lymphatic: Negative.    Skin: Negative.    Musculoskeletal:  Negative for muscle weakness.   Gastrointestinal: Negative.  Negative for abdominal pain.   Genitourinary: Negative.  Negative for nocturia.   Neurological: Negative.  Negative for dizziness and weakness.   Psychiatric/Behavioral: Negative.     Allergic/Immunologic: Negative.    All other systems reviewed and are negative.    Family History   Problem Relation Name Age of Onset    Stroke Sister x3     Hypertension Sister x3     Cancer  Sister x3         Stomach    Stomach cancer Sister x3     Heart disease Sister x3     Aneurysm Sister x3     No Known Problems Mother      Alcohol abuse Father      No Known Problems Maternal Aunt      No Known Problems Maternal Uncle      No Known Problems Paternal Aunt      No Known Problems Paternal Uncle      No Known Problems Maternal Grandmother      No Known Problems Maternal Grandfather      No Known Problems Paternal Grandmother      No Known Problems Paternal Grandfather      No Known Problems Son x2     Amblyopia Neg Hx      Blindness Neg Hx      Cataracts Neg Hx      Diabetes Neg Hx      Glaucoma Neg Hx      Macular degeneration Neg Hx      Retinal detachment Neg Hx      Strabismus Neg Hx      Thyroid disease Neg Hx       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   Constitutional:       Appearance: He is well-developed.   HENT:      Head: Normocephalic and atraumatic.   Eyes:      Conjunctiva/sclera: Conjunctivae normal.      Pupils: Pupils are equal, round, and reactive to light.   Cardiovascular:      Rate and Rhythm: Normal rate and regular rhythm.      Pulses: Intact distal pulses.      Heart sounds: Normal heart sounds.   Pulmonary:      Effort: Pulmonary effort is normal.      Breath sounds: Normal breath sounds.   Abdominal:      General: Bowel sounds are normal.      Palpations: Abdomen is soft.   Musculoskeletal:      Cervical back: Normal range of motion and neck supple.   Skin:     General: Skin is warm and dry.   Neurological:      Mental Status: He is alert and oriented to person, place, and time.         Assessment:     1. Chronic  systolic heart failure    2. Essential hypertension    3. Paroxysmal atrial fibrillation    4. Aortic atherosclerosis        Plan:     Chronic systolic heart failure    Essential hypertension    Paroxysmal atrial fibrillation    Aortic atherosclerosis    Continue eliquis,  metoprolol- afib-now NSR  Continue entresto- CMY/CHF/afib- EF improved  Dc amiodarone              [1]   Social History  Socioeconomic History    Marital status:     Number of children: 2   Occupational History    Occupation: Retired - [employer]   Tobacco Use    Smoking status: Former     Current packs/day: 0.00     Average packs/day: 1 pack/day for 15.0 years (15.0 ttl pk-yrs)     Types: Cigarettes     Start date: 5/15/1949     Quit date: 5/15/1964     Years since quittin.1     Passive exposure: Past    Smokeless tobacco: Never   Substance and Sexual Activity    Alcohol use: Yes     Alcohol/week: 2.0 standard drinks of alcohol     Types: 2 Cans of beer per week    Drug use: No    Sexual activity: Not Currently     Partners: Female     Social Drivers of Health     Financial Resource Strain: Low Risk  (2025)    Overall Financial Resource Strain (CARDIA)     Difficulty of Paying Living Expenses: Not hard at all   Food Insecurity: No Food Insecurity (2025)    Hunger Vital Sign     Worried About Running Out of Food in the Last Year: Never true     Ran Out of Food in the Last Year: Never true   Transportation Needs: No Transportation Needs (2025)    TRANSPORTATION NEEDS     Transportation : No   Stress: No Stress Concern Present (2025)    [employer] [place] [employer] - Occupational Stress Questionnaire     Feeling of Stress : Not at all   Housing Stability: Unknown (2025)    Housing Stability Vital Sign     Unable to Pay for Housing in the Last Year: No     Homeless in the Last Year: No   [2]   Current Outpatient Medications on File Prior to Visit   Medication Sig Dispense Refill    amiodarone  (PACERONE) 200 MG Tab Take 1 tablet (200 mg total) by mouth once daily. 90 tablet 3    apixaban (ELIQUIS) 5 mg Tab Take 1 tablet (5 mg total) by mouth 2 (two) times daily. 180 tablet 3    aspirin 81 MG Chew Take 81 mg by mouth once daily.      cetirizine (ZYRTEC) 10 MG tablet Take 1 tablet (10 mg total) by mouth every evening. 30 tablet 0    furosemide (LASIX) 20 MG tablet Take 1 tablet (20 mg total) by mouth once daily. 90 tablet 3    melatonin 3 mg Tab Take 3 mg by mouth every evening.      metoprolol tartrate (LOPRESSOR) 50 MG tablet Take 1 tablet (50 mg total) by mouth 2 (two) times daily. 180 tablet 3    multivit-min-FA-lycopen-lutein 0.4-300-250 mg-mcg-mcg Tab Take 1 tablet by mouth once daily.       sacubitriL-valsartan (ENTRESTO) 24-26 mg per tablet Take 1 tablet by mouth 2 (two) times daily. 180 tablet 3    spironolactone (ALDACTONE) 25 MG tablet Take 1 tablet (25 mg total) by mouth once daily. 90 tablet 3     No current facility-administered medications on file prior to visit.

## 2025-06-26 LAB
OHS QRS DURATION: 98 MS
OHS QTC CALCULATION: 455 MS

## 2025-07-07 ENCOUNTER — OFFICE VISIT (OUTPATIENT)
Dept: CARDIOLOGY | Facility: CLINIC | Age: OVER 89
End: 2025-07-07
Payer: MEDICARE

## 2025-07-07 VITALS
HEIGHT: 72 IN | BODY MASS INDEX: 24.61 KG/M2 | WEIGHT: 181.69 LBS | DIASTOLIC BLOOD PRESSURE: 60 MMHG | HEART RATE: 46 BPM | SYSTOLIC BLOOD PRESSURE: 126 MMHG

## 2025-07-07 DIAGNOSIS — I48.0 PAROXYSMAL ATRIAL FIBRILLATION: ICD-10-CM

## 2025-07-07 PROCEDURE — 99214 OFFICE O/P EST MOD 30 MIN: CPT | Mod: HCNC,S$GLB,, | Performed by: PHYSICIAN ASSISTANT

## 2025-07-07 PROCEDURE — 99999 PR PBB SHADOW E&M-EST. PATIENT-LVL II: CPT | Mod: PBBFAC,HCNC,, | Performed by: PHYSICIAN ASSISTANT

## 2025-07-07 PROCEDURE — 1126F AMNT PAIN NOTED NONE PRSNT: CPT | Mod: CPTII,HCNC,S$GLB, | Performed by: PHYSICIAN ASSISTANT

## 2025-07-07 NOTE — PROGRESS NOTES
[facility] Provider Note (Follow-up) Consult Note      HPI: The patient is a 89 yo male with past medical history of arthritis, hypertension and melanoma who presents for 6 month follow up    Has done veyr well last 5 months    Saw Dr Fonseca last    No CV issues       In a fib but rate controlled     Sees Dr Calle      PHYSICAL:   Vitals:    07/07/25 0916   BP: 126/60   Pulse: (!) 46          JVD: no,    Heart rhythm: regular  Cardiac murmur: No    S3: no  S4: no  Lungs: clear  Liver span: 10 cm:   Hepatojugular reflux: no  Edema: no,       ASSESSMENT: chronic diastolic HF    PLAN:      Patient Instructions:   Instruct the patient to notify this clinic if HH, a physician or an advanced care provider wants to change medication one of their HF medications   Activity and Diet restrictions:   Recommend 2-3 gram sodium restriction and 1500cc- 2000cc fluid restriction.  Encourage physical activity with graded exercise program.  Requested patient to weigh themselves daily, and to notify us if their weight increases by more than 3 lbs in 1 day or 5 lbs in 3 days.    Assigned dry weight on home scale: daily weight  Medication changes (include current dose and changed dose)  CHF education done  Continue eliquis, rate control for atrial fibrillation  GDMT with ARNi and BB  No addition with BP  RTC 5 months with echo

## 2025-08-15 ENCOUNTER — OFFICE VISIT (OUTPATIENT)
Dept: OPHTHALMOLOGY | Facility: CLINIC | Age: OVER 89
End: 2025-08-15
Payer: MEDICARE

## 2025-08-15 DIAGNOSIS — H35.3222 EXUDATIVE AGE-RELATED MACULAR DEGENERATION OF LEFT EYE WITH INACTIVE CHOROIDAL NEOVASCULARIZATION: ICD-10-CM

## 2025-08-15 DIAGNOSIS — H35.3112 NONEXUDATIVE AGE-RELATED MACULAR DEGENERATION, RIGHT EYE, INTERMEDIATE DRY STAGE: Primary | ICD-10-CM

## 2025-08-15 DIAGNOSIS — H53.15 METAMORPHOPSIA: ICD-10-CM

## 2025-08-15 DIAGNOSIS — Z96.1 PSEUDOPHAKIA OF BOTH EYES: ICD-10-CM

## 2025-08-15 PROCEDURE — 99999 PR PBB SHADOW E&M-EST. PATIENT-LVL III: CPT | Mod: PBBFAC,HCNC,, | Performed by: OPHTHALMOLOGY

## (undated) DEVICE — GOWN SURGICAL X-LARGE

## (undated) DEVICE — SHEILD PLASTIC EYE

## (undated) DEVICE — TIP PHACO 45 DEGREE KELMAN

## (undated) DEVICE — DRAPE STERI-DRAPE APERTURE

## (undated) DEVICE — GARTER EYE ADULT

## (undated) DEVICE — SEE MEDLINE ITEM 157131

## (undated) DEVICE — SOL BETADINE 5%

## (undated) DEVICE — SHIELD COLLAGEN 12HR CORNEAL

## (undated) DEVICE — Device

## (undated) DEVICE — SOL IRR BSS OPHTH 500ML STRL

## (undated) DEVICE — SOL WATER STRL IRR 1000ML

## (undated) DEVICE — CANNULA OPHTHALMIC J SHAPE 25G

## (undated) DEVICE — KIT GREY EYE

## (undated) DEVICE — DRESSING TRANS 4X4 TEGADERM

## (undated) DEVICE — DRESSING EYE OVAL LF

## (undated) DEVICE — SHIELD EYE PLASTIC 3100G